# Patient Record
Sex: MALE | Race: WHITE | NOT HISPANIC OR LATINO | Employment: FULL TIME | URBAN - NONMETROPOLITAN AREA
[De-identification: names, ages, dates, MRNs, and addresses within clinical notes are randomized per-mention and may not be internally consistent; named-entity substitution may affect disease eponyms.]

---

## 2017-08-13 ENCOUNTER — HISTORY (OUTPATIENT)
Dept: EMERGENCY MEDICINE | Facility: OTHER | Age: 57
End: 2017-08-13

## 2017-08-14 ENCOUNTER — HISTORY (OUTPATIENT)
Dept: EMERGENCY MEDICINE | Facility: OTHER | Age: 57
End: 2017-08-14

## 2017-08-18 ENCOUNTER — HISTORY (OUTPATIENT)
Dept: EMERGENCY MEDICINE | Facility: OTHER | Age: 57
End: 2017-08-18

## 2017-08-18 ENCOUNTER — COMMUNICATION - GICH (OUTPATIENT)
Dept: EMERGENCY MEDICINE | Facility: OTHER | Age: 57
End: 2017-08-18

## 2017-08-18 DIAGNOSIS — A69.20 LYME DISEASE: ICD-10-CM

## 2017-08-18 DIAGNOSIS — G51.0 BELL'S PALSY: ICD-10-CM

## 2017-08-21 ENCOUNTER — OFFICE VISIT - GICH (OUTPATIENT)
Dept: FAMILY MEDICINE | Facility: OTHER | Age: 57
End: 2017-08-21

## 2017-08-21 ENCOUNTER — HISTORY (OUTPATIENT)
Dept: FAMILY MEDICINE | Facility: OTHER | Age: 57
End: 2017-08-21

## 2017-08-21 DIAGNOSIS — G51.0 BELL'S PALSY: ICD-10-CM

## 2017-08-21 DIAGNOSIS — A69.20 LYME DISEASE: ICD-10-CM

## 2017-08-21 ASSESSMENT — PATIENT HEALTH QUESTIONNAIRE - PHQ9: SUM OF ALL RESPONSES TO PHQ QUESTIONS 1-9: 0

## 2017-12-28 NOTE — PROGRESS NOTES
Patient Information     Patient Name MRN Sex Julius Peña [id number] Male 1960      Progress Notes by Venancio Foley MD at 2017 10:30 AM     Author:  Venancio Foley MD Service:  (none) Author Type:  Physician     Filed:  2017 11:16 AM Encounter Date:  2017 Status:  Signed     :  Venancio Foley MD (Physician)            SUBJECTIVE:    Julius Hansen is a 57 y.o. male who presents for follow-up Bell's palsy    HPI Comments: Patient arrives here for follow-up Bell's palsy. He was recent seen in the ER and started on Valtrex prednisone and doxycycline. His tested come back positive for Bell's palsy he arrives here for follow-up. No improvement has been noted.      No Known Allergies, No family history on file.,   Current Outpatient Prescriptions on File Prior to Visit       Medication  Sig Dispense Refill     doxycycline (VIBRAMYCIN) 100 mg tablet Take 1 tablet by mouth 2 times daily for 28 days. 56 tablet 0     mineral oil external liquid        multivitamin (MVI) tablet Take 1 tablet by mouth once daily.       POLYETHYLENE GLYCOL 3350 (MIRALAX ORAL) Take  by mouth.       No current facility-administered medications on file prior to visit.    ,   Past Surgical History:      Procedure  Laterality Date     NO PREVIOUS SURGERY     ,   Social History      Substance Use Topics        Smoking status:  Current Every Day Smoker     Packs/day: 1.00     Years: 20.00     Types: Cigarettes     Smokeless tobacco:  Never Used     Alcohol use  No    and   Social History      Substance Use Topics        Smoking status:  Current Every Day Smoker     Packs/day: 1.00     Years: 20.00     Types: Cigarettes     Smokeless tobacco:  Never Used     Alcohol use  No       REVIEW OF SYSTEMS:  ROS    OBJECTIVE:  /68  Pulse 64  Wt 95.9 kg (211 lb 6.4 oz)  BMI 29.6 kg/m2    EXAM:   Physical Exam   Constitutional: He is well-developed, well-nourished, and in no distress.   Neurological:   He  has right-sided facial weakness. Difficulty closing eyes. Good range of motion to his tongue.       ASSESSMENT/PLAN:    ICD-10-CM    1. Lyme disease A69.20    2. Facial paralysis/[place] palsy G51.0         Plan:  Recommended that he discontinue his prednisone. Can finish his course of doxycycline. We discussed resolution of the symptoms which according to up-to-date most people have complete resolution. Follow-up if getting worse.

## 2017-12-28 NOTE — TELEPHONE ENCOUNTER
Patient Information     Patient Name MRN Sex Julius Peña [id number] Male 1960      Telephone Encounter by Margarita Vargas at 2017  8:21 AM     Author:  Margarita Vargas Service:  (none) Author Type:  Physician     Filed:  2017  8:24 AM Encounter Date:  2017 Status:  Signed     :  Margarita Vargas (Physician)            I did leave a message for Don that his Lyme disease titers did return positive for an acute Lyme disease. This is what is causing his Bell's palsy. He needs to finish the medications as prescribed. However he definitely needs to follow-up this next week to be rechecked. If his Bell's palsy is not improving he may need a course of IV antibiotics. I have advised him to call back to the emergency department if he has any further questions.

## 2017-12-30 NOTE — NURSING NOTE
Patient Information     Patient Name MRN Sex Julius Peña [id number] Male 1960      Nursing Note by Belinda Bryan at 2017 10:30 AM     Author:  Belinda Bryan Service:  (none) Author Type:  (none)     Filed:  2017 10:34 AM Encounter Date:  2017 Status:  Signed     :  Belinda Bryan            Patient here for follow up from ED visits constipation and bells palsy. Belinda Bryan LPN .......................2017  10:29 AM

## 2018-01-27 VITALS
DIASTOLIC BLOOD PRESSURE: 68 MMHG | SYSTOLIC BLOOD PRESSURE: 122 MMHG | BODY MASS INDEX: 29.9 KG/M2 | WEIGHT: 211.4 LBS | HEART RATE: 64 BPM

## 2018-01-29 ASSESSMENT — PATIENT HEALTH QUESTIONNAIRE - PHQ9: SUM OF ALL RESPONSES TO PHQ QUESTIONS 1-9: 0

## 2018-02-21 ENCOUNTER — DOCUMENTATION ONLY (OUTPATIENT)
Dept: FAMILY MEDICINE | Facility: OTHER | Age: 58
End: 2018-02-21

## 2018-02-21 PROBLEM — A69.20 LYME DISEASE: Status: ACTIVE | Noted: 2017-08-15

## 2018-02-21 PROBLEM — F12.90 MARIJUANA USE: Status: ACTIVE | Noted: 2018-02-21

## 2018-02-21 PROBLEM — F17.200 TOBACCO DEPENDENCE: Status: ACTIVE | Noted: 2017-08-14

## 2018-02-21 PROBLEM — G51.0 FACIAL PARALYSIS/BELLS PALSY: Status: ACTIVE | Noted: 2017-08-15

## 2018-02-21 RX ORDER — MAGNESIUM CARB/ALUMINUM HYDROX 105-160MG
TABLET,CHEWABLE ORAL
COMMUNITY
End: 2021-03-29

## 2018-02-21 RX ORDER — DIPHENOXYLATE HYDROCHLORIDE AND ATROPINE SULFATE 2.5; .025 MG/1; MG/1
1 TABLET ORAL DAILY
COMMUNITY
End: 2021-03-29

## 2018-02-21 RX ORDER — POLYETHYLENE GLYCOL 3350 17 G/17G
POWDER, FOR SOLUTION ORAL
COMMUNITY
End: 2021-03-29

## 2021-03-18 ENCOUNTER — HOSPITAL ENCOUNTER (EMERGENCY)
Facility: OTHER | Age: 61
Discharge: HOME | End: 2021-03-18
Attending: STUDENT IN AN ORGANIZED HEALTH CARE EDUCATION/TRAINING PROGRAM | Admitting: STUDENT IN AN ORGANIZED HEALTH CARE EDUCATION/TRAINING PROGRAM
Payer: COMMERCIAL

## 2021-03-18 ENCOUNTER — APPOINTMENT (OUTPATIENT)
Dept: CT IMAGING | Facility: OTHER | Age: 61
End: 2021-03-18
Attending: STUDENT IN AN ORGANIZED HEALTH CARE EDUCATION/TRAINING PROGRAM
Payer: COMMERCIAL

## 2021-03-18 VITALS
DIASTOLIC BLOOD PRESSURE: 89 MMHG | HEART RATE: 62 BPM | RESPIRATION RATE: 16 BRPM | TEMPERATURE: 99.1 F | HEIGHT: 71 IN | WEIGHT: 210 LBS | OXYGEN SATURATION: 98 % | SYSTOLIC BLOOD PRESSURE: 143 MMHG | BODY MASS INDEX: 29.4 KG/M2

## 2021-03-18 DIAGNOSIS — N13.30 HYDRONEPHROSIS, RIGHT: ICD-10-CM

## 2021-03-18 DIAGNOSIS — R31.0 GROSS HEMATURIA: ICD-10-CM

## 2021-03-18 DIAGNOSIS — R10.2 SUPRAPUBIC PAIN: ICD-10-CM

## 2021-03-18 LAB
ALBUMIN SERPL-MCNC: 4 G/DL (ref 3.5–5.7)
ALBUMIN UR-MCNC: 300 MG/DL
ALP SERPL-CCNC: 107 U/L (ref 34–104)
ALT SERPL W P-5'-P-CCNC: 13 U/L (ref 7–52)
ANION GAP SERPL CALCULATED.3IONS-SCNC: 10 MMOL/L (ref 3–14)
APPEARANCE UR: ABNORMAL
AST SERPL W P-5'-P-CCNC: 20 U/L (ref 13–39)
BACTERIA #/AREA URNS HPF: ABNORMAL /HPF
BASOPHILS # BLD AUTO: 0 10E9/L (ref 0–0.2)
BASOPHILS NFR BLD AUTO: 0.3 %
BILIRUB SERPL-MCNC: 0.6 MG/DL (ref 0.3–1)
BILIRUB UR QL STRIP: NEGATIVE
BUN SERPL-MCNC: 15 MG/DL (ref 7–25)
CALCIUM SERPL-MCNC: 9.3 MG/DL (ref 8.6–10.3)
CHLORIDE SERPL-SCNC: 100 MMOL/L (ref 98–107)
CO2 SERPL-SCNC: 22 MMOL/L (ref 21–31)
COLOR UR AUTO: ABNORMAL
CREAT SERPL-MCNC: 1.26 MG/DL (ref 0.7–1.3)
DIFFERENTIAL METHOD BLD: ABNORMAL
EOSINOPHIL # BLD AUTO: 0.1 10E9/L (ref 0–0.7)
EOSINOPHIL NFR BLD AUTO: 0.8 %
ERYTHROCYTE [DISTWIDTH] IN BLOOD BY AUTOMATED COUNT: 14.7 % (ref 10–15)
GFR SERPL CREATININE-BSD FRML MDRD: 58 ML/MIN/{1.73_M2}
GLUCOSE SERPL-MCNC: 111 MG/DL (ref 70–105)
GLUCOSE UR STRIP-MCNC: NEGATIVE MG/DL
HCT VFR BLD AUTO: 34.8 % (ref 40–53)
HGB BLD-MCNC: 11.1 G/DL (ref 13.3–17.7)
HGB UR QL STRIP: ABNORMAL
IMM GRANULOCYTES # BLD: 0 10E9/L (ref 0–0.4)
IMM GRANULOCYTES NFR BLD: 0.3 %
KETONES UR STRIP-MCNC: 10 MG/DL
LABORATORY COMMENT REPORT: NORMAL
LACTATE BLD-SCNC: 0.7 MMOL/L (ref 0.7–2)
LEUKOCYTE ESTERASE UR QL STRIP: ABNORMAL
LYMPHOCYTES # BLD AUTO: 1.4 10E9/L (ref 0.8–5.3)
LYMPHOCYTES NFR BLD AUTO: 15.8 %
MCH RBC QN AUTO: 23.8 PG (ref 26.5–33)
MCHC RBC AUTO-ENTMCNC: 31.9 G/DL (ref 31.5–36.5)
MCV RBC AUTO: 75 FL (ref 78–100)
MONOCYTES # BLD AUTO: 0.7 10E9/L (ref 0–1.3)
MONOCYTES NFR BLD AUTO: 8.3 %
NEUTROPHILS # BLD AUTO: 6.4 10E9/L (ref 1.6–8.3)
NEUTROPHILS NFR BLD AUTO: 74.5 %
NITRATE UR QL: NEGATIVE
PH UR STRIP: 7 PH (ref 5–7)
PLATELET # BLD AUTO: 354 10E9/L (ref 150–450)
POTASSIUM SERPL-SCNC: 3.9 MMOL/L (ref 3.5–5.1)
PROT SERPL-MCNC: 7.6 G/DL (ref 6.4–8.9)
RBC # BLD AUTO: 4.66 10E12/L (ref 4.4–5.9)
RBC #/AREA URNS AUTO: >182 /HPF (ref 0–2)
SARS-COV-2 RNA RESP QL NAA+PROBE: NEGATIVE
SODIUM SERPL-SCNC: 132 MMOL/L (ref 134–144)
SOURCE: ABNORMAL
SP GR UR STRIP: >1.035 (ref 1–1.03)
SPECIMEN SOURCE: NORMAL
UROBILINOGEN UR STRIP-MCNC: NORMAL MG/DL (ref 0–2)
WBC # BLD AUTO: 8.7 10E9/L (ref 4–11)
WBC #/AREA URNS AUTO: >182 /HPF (ref 0–5)
WBC CLUMPS #/AREA URNS HPF: PRESENT /HPF

## 2021-03-18 PROCEDURE — U0005 INFEC AGEN DETEC AMPLI PROBE: HCPCS | Performed by: STUDENT IN AN ORGANIZED HEALTH CARE EDUCATION/TRAINING PROGRAM

## 2021-03-18 PROCEDURE — 250N000013 HC RX MED GY IP 250 OP 250 PS 637: Performed by: STUDENT IN AN ORGANIZED HEALTH CARE EDUCATION/TRAINING PROGRAM

## 2021-03-18 PROCEDURE — 258N000003 HC RX IP 258 OP 636: Performed by: STUDENT IN AN ORGANIZED HEALTH CARE EDUCATION/TRAINING PROGRAM

## 2021-03-18 PROCEDURE — 255N000002 HC RX 255 OP 636: Performed by: STUDENT IN AN ORGANIZED HEALTH CARE EDUCATION/TRAINING PROGRAM

## 2021-03-18 PROCEDURE — 83605 ASSAY OF LACTIC ACID: CPT | Performed by: STUDENT IN AN ORGANIZED HEALTH CARE EDUCATION/TRAINING PROGRAM

## 2021-03-18 PROCEDURE — C9803 HOPD COVID-19 SPEC COLLECT: HCPCS | Performed by: STUDENT IN AN ORGANIZED HEALTH CARE EDUCATION/TRAINING PROGRAM

## 2021-03-18 PROCEDURE — 80053 COMPREHEN METABOLIC PANEL: CPT | Performed by: STUDENT IN AN ORGANIZED HEALTH CARE EDUCATION/TRAINING PROGRAM

## 2021-03-18 PROCEDURE — 99285 EMERGENCY DEPT VISIT HI MDM: CPT | Mod: 25 | Performed by: STUDENT IN AN ORGANIZED HEALTH CARE EDUCATION/TRAINING PROGRAM

## 2021-03-18 PROCEDURE — 87086 URINE CULTURE/COLONY COUNT: CPT | Performed by: STUDENT IN AN ORGANIZED HEALTH CARE EDUCATION/TRAINING PROGRAM

## 2021-03-18 PROCEDURE — 96375 TX/PRO/DX INJ NEW DRUG ADDON: CPT | Performed by: STUDENT IN AN ORGANIZED HEALTH CARE EDUCATION/TRAINING PROGRAM

## 2021-03-18 PROCEDURE — U0003 INFECTIOUS AGENT DETECTION BY NUCLEIC ACID (DNA OR RNA); SEVERE ACUTE RESPIRATORY SYNDROME CORONAVIRUS 2 (SARS-COV-2) (CORONAVIRUS DISEASE [COVID-19]), AMPLIFIED PROBE TECHNIQUE, MAKING USE OF HIGH THROUGHPUT TECHNOLOGIES AS DESCRIBED BY CMS-2020-01-R: HCPCS | Performed by: STUDENT IN AN ORGANIZED HEALTH CARE EDUCATION/TRAINING PROGRAM

## 2021-03-18 PROCEDURE — 96374 THER/PROPH/DIAG INJ IV PUSH: CPT | Performed by: STUDENT IN AN ORGANIZED HEALTH CARE EDUCATION/TRAINING PROGRAM

## 2021-03-18 PROCEDURE — 74177 CT ABD & PELVIS W/CONTRAST: CPT | Mod: TC

## 2021-03-18 PROCEDURE — 81001 URINALYSIS AUTO W/SCOPE: CPT | Performed by: STUDENT IN AN ORGANIZED HEALTH CARE EDUCATION/TRAINING PROGRAM

## 2021-03-18 PROCEDURE — 99284 EMERGENCY DEPT VISIT MOD MDM: CPT | Performed by: STUDENT IN AN ORGANIZED HEALTH CARE EDUCATION/TRAINING PROGRAM

## 2021-03-18 PROCEDURE — 36415 COLL VENOUS BLD VENIPUNCTURE: CPT | Performed by: STUDENT IN AN ORGANIZED HEALTH CARE EDUCATION/TRAINING PROGRAM

## 2021-03-18 PROCEDURE — 250N000011 HC RX IP 250 OP 636: Performed by: STUDENT IN AN ORGANIZED HEALTH CARE EDUCATION/TRAINING PROGRAM

## 2021-03-18 PROCEDURE — 85025 COMPLETE CBC W/AUTO DIFF WBC: CPT | Performed by: STUDENT IN AN ORGANIZED HEALTH CARE EDUCATION/TRAINING PROGRAM

## 2021-03-18 RX ORDER — SODIUM CHLORIDE 9 MG/ML
INJECTION, SOLUTION INTRAVENOUS CONTINUOUS
Status: DISCONTINUED | OUTPATIENT
Start: 2021-03-18 | End: 2021-03-18 | Stop reason: HOSPADM

## 2021-03-18 RX ORDER — ONDANSETRON 2 MG/ML
4 INJECTION INTRAMUSCULAR; INTRAVENOUS ONCE
Status: COMPLETED | OUTPATIENT
Start: 2021-03-18 | End: 2021-03-18

## 2021-03-18 RX ORDER — SODIUM CHLORIDE 9 MG/ML
INJECTION, SOLUTION INTRAVENOUS CONTINUOUS
Status: DISCONTINUED | OUTPATIENT
Start: 2021-03-18 | End: 2021-03-18

## 2021-03-18 RX ORDER — ACETAMINOPHEN 325 MG/1
975 TABLET ORAL ONCE
Status: COMPLETED | OUTPATIENT
Start: 2021-03-18 | End: 2021-03-18

## 2021-03-18 RX ORDER — LEVOFLOXACIN 750 MG/1
750 TABLET, FILM COATED ORAL DAILY
Qty: 7 TABLET | Refills: 0 | Status: SHIPPED | OUTPATIENT
Start: 2021-03-18 | End: 2021-03-25

## 2021-03-18 RX ORDER — METRONIDAZOLE 500 MG/1
500 TABLET ORAL 3 TIMES DAILY
Qty: 21 TABLET | Refills: 0 | Status: SHIPPED | OUTPATIENT
Start: 2021-03-18 | End: 2021-03-25

## 2021-03-18 RX ORDER — IODIXANOL 320 MG/ML
100 INJECTION, SOLUTION INTRAVASCULAR ONCE
Status: COMPLETED | OUTPATIENT
Start: 2021-03-18 | End: 2021-03-18

## 2021-03-18 RX ADMIN — SODIUM CHLORIDE 1000 ML: 9 INJECTION, SOLUTION INTRAVENOUS at 06:04

## 2021-03-18 RX ADMIN — ACETAMINOPHEN 975 MG: 325 TABLET ORAL at 06:34

## 2021-03-18 RX ADMIN — IODIXANOL 100 ML: 320 INJECTION, SOLUTION INTRAVASCULAR at 05:23

## 2021-03-18 RX ADMIN — HYDROMORPHONE HYDROCHLORIDE 1 MG: 1 INJECTION, SOLUTION INTRAMUSCULAR; INTRAVENOUS; SUBCUTANEOUS at 04:49

## 2021-03-18 RX ADMIN — ONDANSETRON 4 MG: 2 INJECTION INTRAMUSCULAR; INTRAVENOUS at 04:49

## 2021-03-18 ASSESSMENT — MIFFLIN-ST. JEOR: SCORE: 1779.68

## 2021-03-18 NOTE — ED PROVIDER NOTES
History     Chief Complaint   Patient presents with     Abdominal Pain       Julius Hansen is a 61 year old year old male presenting with abdominal pain. Pain is located in the suprapubic region starting with sudden onset approximately 24 hours ago yesterday and has been constant. Pain can radiate to RLQ. He is unable to characterize, but states he does feel like there is something blocking his GI tract despite no clinical constipation with normal bowel movement yesterday. Also reports some hematuria. Denies modifying factors.  No abdominal surgical history.  He tried some MiraLAX with no improvement.  He did have a similar albeit less severe episode sometime ago which was attributed to constipation. Denies fever, chills, nausea, vomiting, other pain, dyspnea, dysuria, urinary retention, diarrhea, blood in stool, numbness, weakness.      No Known Allergies    Patient Active Problem List    Diagnosis Date Noted     Marijuana use 02/21/2018     Priority: Medium     Facial paralysis/[place] palsy 08/15/2017     Priority: Medium     Lyme disease 08/15/2017     Priority: Medium     Tobacco dependence 08/14/2017     Priority: Medium       Past Medical History:   Diagnosis Date     Personal history of other medical treatment (CODE)        Past Surgical History:   Procedure Laterality Date     OTHER SURGICAL HISTORY      [id number],NO PREVIOUS SURGERY       No family history on file.    Social History     Tobacco Use     Smoking status: Current Every Day Smoker     Packs/day: 1.00     Years: 20.00     Pack years: 20.00     Types: Cigarettes     Smokeless tobacco: Never Used   Substance Use Topics     Alcohol use: No     Drug use: Unknown     Types: Other     Comment: Drug use: No       Medications:    levofloxacin (LEVAQUIN) 750 MG tablet  metroNIDAZOLE (FLAGYL) 500 MG tablet  mineral oil (BL MINERAL OIL) oil  Multiple Vitamin (MULTI-VITAMINS) TABS  polyethylene glycol (MIRALAX/GLYCOLAX) Packet        Review of Systems: See  "HPI for pertinent negative and positive findings.    Physical Exam   BP (!) 143/89   Pulse 62   Temp 99.1  F (37.3  C) (Tympanic)   Resp 16   Ht 1.803 m (5' 11\")   Wt 95.3 kg (210 lb)   SpO2 98%   BMI 29.29 kg/m       General: awake, uncomfortable  HEENT: atraumatic, neck supple  Respiratory: normal effort, clear to auscultation bilaterally  Cardiovascular: regular rate and rhythm, no murmurs or gallops  Abdomen: BS+, soft, nondistended, tender in suprapubic region, no guarding or rebound  Extremities: no deformities, edema, or tenderness  : no CVA tenderness  Skin: warm, dry, no rashes  Neuro: alert, no focal deficits    ED Course           Results for orders placed or performed during the hospital encounter of 03/18/21 (from the past 24 hour(s))   CBC with platelets differential   Result Value Ref Range    WBC 8.7 4.0 - 11.0 10e9/L    RBC Count 4.66 4.4 - 5.9 10e12/L    Hemoglobin 11.1 (L) 13.3 - 17.7 g/dL    Hematocrit 34.8 (L) 40.0 - 53.0 %    MCV 75 (L) 78 - 100 fl    MCH 23.8 (L) 26.5 - 33.0 pg    MCHC 31.9 31.5 - 36.5 g/dL    RDW 14.7 10.0 - 15.0 %    Platelet Count 354 150 - 450 10e9/L    Diff Method Automated Method     % Neutrophils 74.5 %    % Lymphocytes 15.8 %    % Monocytes 8.3 %    % Eosinophils 0.8 %    % Basophils 0.3 %    % Immature Granulocytes 0.3 %    Absolute Neutrophil 6.4 1.6 - 8.3 10e9/L    Absolute Lymphocytes 1.4 0.8 - 5.3 10e9/L    Absolute Monocytes 0.7 0.0 - 1.3 10e9/L    Absolute Eosinophils 0.1 0.0 - 0.7 10e9/L    Absolute Basophils 0.0 0.0 - 0.2 10e9/L    Abs Immature Granulocytes 0.0 0 - 0.4 10e9/L   Comprehensive metabolic panel   Result Value Ref Range    Sodium 132 (L) 134 - 144 mmol/L    Potassium 3.9 3.5 - 5.1 mmol/L    Chloride 100 98 - 107 mmol/L    Carbon Dioxide 22 21 - 31 mmol/L    Anion Gap 10 3 - 14 mmol/L    Glucose 111 (H) 70 - 105 mg/dL    Urea Nitrogen 15 7 - 25 mg/dL    Creatinine 1.26 0.70 - 1.30 mg/dL    GFR Estimate 58 (L) >60 mL/min/[1.73_m2]    GFR " Estimate If Black 70 >60 mL/min/[1.73_m2]    Calcium 9.3 8.6 - 10.3 mg/dL    Bilirubin Total 0.6 0.3 - 1.0 mg/dL    Albumin 4.0 3.5 - 5.7 g/dL    Protein Total 7.6 6.4 - 8.9 g/dL    Alkaline Phosphatase 107 (H) 34 - 104 U/L    ALT 13 7 - 52 U/L    AST 20 13 - 39 U/L   Lactic acid   Result Value Ref Range    Lactic Acid 0.7 0.7 - 2.0 mmol/L   CT Abdomen Pelvis w Contrast    Narrative    PROCEDURE INFORMATION:   Exam: CT Abdomen And Pelvis With Contrast   Exam date and time: 3/18/2021 5:10 AM   Age: 61 years old   Clinical indication: Constipation; Abdominal pain; Generalized     TECHNIQUE:   Imaging protocol: Computed tomography of the abdomen and pelvis with contrast.   Radiation optimization: All CT scans at this facility use at least one of these   dose optimization techniques: automated exposure control; mA and/or kV   adjustment per patient size (includes targeted exams where dose is matched to   clinical indication); or iterative reconstruction.   Contrast material: VISIPAQUE 320; Contrast volume: 100 ml; Contrast route:   INTRAVENOUS (IV);      COMPARISON:   No relevant prior studies available.     FINDINGS:   Liver: Normal. No mass.   Gallbladder and bile ducts: Possible gallstones.   Pancreas: Normal. No ductal dilation.   Spleen: Normal. No splenomegaly.   Adrenal glands: Normal. No mass.   Kidneys and ureters: Severe hydronephrosis, longstanding, with cortical   thinning right kidney. A few calcifications within the distended renal pelvis.   Level of obstruction in the region of the proximal right ureter. Severe   thickening of the ureteric mucosa with apparent mass. Recommend further   evaluation/retrograde.   Stomach and bowel: No bowel obstruction.   Appendix: Appendix normal.     Intraperitoneal space: No free fluid within the pelvis or within the dependent   portions of the peritoneum.   Vasculature: Calcification of the aorta. Flow within the superior mesenteric   artery, celiac trunk and inferior  mesenteric arteries.   Lymph nodes: Unremarkable. No enlarged lymph nodes.   Urinary bladder: Unremarkable as visualized.   Reproductive: Unremarkable as visualized.   Bones/joints: Moderate degenerative disc disease. Degenerative changes are   present within the spine.   Soft tissues: Unremarkable.       Impression    IMPRESSION:   1. Severe hydronephrosis, longstanding, with cortical thinning right kidney. A   few calcifications within the distended renal pelvis. Level of obstruction in   the region of the proximal right ureter. Severe thickening of the ureteric   mucosa with apparent mass. Recommend further evaluation/retrograde. Rule out   ureteric mass.visualized portions of the lung bases normal.   2. No bowel obstruction.   3. No free fluid within the pelvis or within the dependent portions of the   peritoneum.   4. Appendix normal.   5. Possible gallstones.     THIS DOCUMENT HAS BEEN ELECTRONICALLY SIGNED BY ANTOINETTE LOPEZ MD   UA with Microscopic reflex to Culture    Specimen: Urine clean catch; Midstream Urine   Result Value Ref Range    Color Urine Dark Brown     Appearance Urine Cloudy     Glucose Urine Negative NEG^Negative mg/dL    Bilirubin Urine Negative NEG^Negative    Ketones Urine 10 (A) NEG^Negative mg/dL    Specific Gravity Urine >1.035 (H) 1.003 - 1.035    Blood Urine Large (A) NEG^Negative    pH Urine 7.0 5.0 - 7.0 pH    Protein Albumin Urine 300 (A) NEG^Negative mg/dL    Urobilinogen mg/dL Normal 0.0 - 2.0 mg/dL    Nitrite Urine Negative NEG^Negative    Leukocyte Esterase Urine Trace (A) NEG^Negative    Source Midstream Urine     WBC Urine >182 (H) 0 - 5 /HPF    RBC Urine >182 (H) 0 - 2 /HPF    WBC Clumps Present (A) NEG^Negative /HPF    Bacteria Urine Moderate (A) NEG^Negative /HPF       Medications   0.9% sodium chloride BOLUS (1,000 mLs Intravenous New Bag 3/18/21 0604)     Followed by   sodium chloride 0.9% infusion (has no administration in time range)   HYDROmorphone (DILAUDID)  injection 1 mg (1 mg Intravenous Given 3/18/21 1829)   ondansetron (ZOFRAN) injection 4 mg (4 mg Intravenous Given 3/18/21 9879)   iodixanol (VISIPAQUE 320) injection 100 mL (100 mLs Intravenous Given 3/18/21 1119)   acetaminophen (TYLENOL) tablet 975 mg (975 mg Oral Given 3/18/21 0634)       Assessments & Plan (with Medical Decision Making)     I have reviewed the nursing notes.    Evaluated for abdominal pain. Differential includes appendicitis, diverticulitis, UTI, incarcerated hernia, mesenteric ischemia, constipation, stone. Suprapubic pain on exam with urine with gross blood. Creatinine wnl. CT shows severe hydronephrosis with unclear source for obstruction. Urology consulted who states this could be an abscess and recommends antibiotics with close PCP follow up as well as urology follow up for additional evaluation for hydronephrosis with hematuria. Pain improved with dilaudid and tylenol. Antibiotics prescribed with strict instructions for close follow up with PCP along with urology referral placed. Patient given ED return precautions. All questions were answered and the patient is comfortable with plan for discharge and verbalizes plan understanding. Discharged in stable condition.    I have reviewed the findings, diagnosis, plan and need for follow up with the patient.    New Prescriptions    LEVOFLOXACIN (LEVAQUIN) 750 MG TABLET    Take 1 tablet (750 mg) by mouth daily for 7 days    METRONIDAZOLE (FLAGYL) 500 MG TABLET    Take 1 tablet (500 mg) by mouth 3 times daily for 7 days       Final diagnoses:   Gross hematuria   Hydronephrosis, right   Suprapubic pain       3/18/2021   [facility]       Girish Phillips MD  03/18/21 7572

## 2021-03-18 NOTE — ED TRIAGE NOTES
ED Nursing Triage Note (General)   ________________________________    Julius Hansen is a 61 year old Male that presents to triage private car  With history of  Lower abdominal pain reported by patient   Significant symptoms had onset yesterday at 1200.  Patient patient states pain has not changed since yesterday.  Denies nausea or vomiting, pain is constant.  Reports normal bowel movements and denies difficulty with urination.  Patient appears alert , in mild distress., and cooperative behavior.  GCS 15  Airway: intact  Breathing noted as Normal.  Circulation Normal  Skin normal  Action taken:  [place] 909      PRE HOSPITAL PRIOR LIVING SITUATION Alone

## 2021-03-18 NOTE — PROGRESS NOTES
1.  Has the patient had a previous reaction to IV contrast? n    2.  Does the patient have kidney disease? n    3.  Is the patient on dialysis? n    If YES to any of these questions, exam will be reviewed with a Radiologist before administering contrast.    1.  Is patient currently taking metformin? n     If NO: Technologist will give the patient normal post CT instructions.     If YES: Technologist will obtain GFR from Lab.    2.  Is GFR is greater than 60? unknown     If YES: Technologist will give the patient normal post CT instructions.     If NO: Technologist will give the patient METFORMIN post CT instructions.

## 2021-03-18 NOTE — DISCHARGE INSTRUCTIONS
As discussed, please  and complete the 2 antibiotic course for possible infection in your abdomen.  Please follow-up with your primary care provider in 1 week or sooner if worsening symptoms.  A urology referral was also placed for you and they will call you to schedule an appointment.  Reasons to return the emergency department include fever/chills, nausea/vomiting, worsening severe pain, or inability to pass urine.

## 2021-03-20 LAB
BACTERIA SPEC CULT: NORMAL
SPECIMEN SOURCE: NORMAL

## 2021-03-24 ENCOUNTER — OFFICE VISIT (OUTPATIENT)
Dept: UROLOGY | Facility: OTHER | Age: 61
End: 2021-03-24
Attending: STUDENT IN AN ORGANIZED HEALTH CARE EDUCATION/TRAINING PROGRAM
Payer: COMMERCIAL

## 2021-03-24 VITALS
SYSTOLIC BLOOD PRESSURE: 136 MMHG | BODY MASS INDEX: 28.51 KG/M2 | WEIGHT: 204.4 LBS | DIASTOLIC BLOOD PRESSURE: 82 MMHG | RESPIRATION RATE: 12 BRPM | HEART RATE: 80 BPM

## 2021-03-24 DIAGNOSIS — R31.0 GROSS HEMATURIA: ICD-10-CM

## 2021-03-24 DIAGNOSIS — N13.30 HYDRONEPHROSIS, RIGHT: ICD-10-CM

## 2021-03-24 PROCEDURE — 99204 OFFICE O/P NEW MOD 45 MIN: CPT | Performed by: UROLOGY

## 2021-03-24 RX ORDER — HYDROCODONE BITARTRATE AND ACETAMINOPHEN 5; 325 MG/1; MG/1
1-2 TABLET ORAL EVERY 4 HOURS PRN
Status: CANCELLED | OUTPATIENT
Start: 2021-03-24

## 2021-03-24 RX ORDER — CEFTRIAXONE 1 G/1
1 INJECTION, POWDER, FOR SOLUTION INTRAMUSCULAR; INTRAVENOUS
Status: CANCELLED | OUTPATIENT
Start: 2021-03-24

## 2021-03-24 SDOH — HEALTH STABILITY: MENTAL HEALTH: HOW OFTEN DO YOU HAVE A DRINK CONTAINING ALCOHOL?: 2-4 TIMES A MONTH

## 2021-03-24 SDOH — HEALTH STABILITY: MENTAL HEALTH: HOW OFTEN DO YOU HAVE 6 OR MORE DRINKS ON ONE OCCASION?: NOT ASKED

## 2021-03-24 SDOH — HEALTH STABILITY: MENTAL HEALTH: HOW MANY STANDARD DRINKS CONTAINING ALCOHOL DO YOU HAVE ON A TYPICAL DAY?: 10 OR MORE

## 2021-03-24 ASSESSMENT — PAIN SCALES - GENERAL: PAINLEVEL: NO PAIN (0)

## 2021-03-24 NOTE — PROGRESS NOTES
Type of Visit  NPV    Chief Complaint  Gross hematuria  Right hydronephrosis    HPI  Mr. Hansen is a 61 year old male who presents with gross hematuria and right hydronephrosis.  Gross hematuria started 1 week ago.  The patient was seen in the ED and labs revealed concern for infection.  The urine culture resulted as negative.  He underwent a CT scan revealing right hydronephrosis.  Patient denies right flank pain now or at any time in the recent past.  He continues the antibiotics that were prescribed and denies dysuria or fevers.    Hematuria-related signs/symptoms  History of smoking?    Yes  History of chemotherapy?   No  History of pelvic radiation?   No  History of kidney or bladder stones?  No  History of frequent urinary tract infections? No      Past Medical History  He  has a past medical history of Personal history of other medical treatment (CODE).  Patient Active Problem List   Diagnosis     Facial paralysis/[place] palsy     Marijuana use     Lyme disease     Tobacco dependence       Past Surgical History  He  has a past surgical history that includes other surgical history.    Medications  He has a current medication list which includes the following prescription(s): levofloxacin, metronidazole, mineral oil, multi-vitamins, and polyethylene glycol.    Allergies  No Known Allergies    Social History  He  reports that he has been smoking cigarettes. He has a 20.00 pack-year smoking history. He has never used smokeless tobacco. He reports current alcohol use.  No drug abuse.    Family History  No family history on file.    Review of Systems  I personally reviewed the ROS with the patient.    Nursing Notes:   Kiki Thacker RN  3/24/2021  2:03 PM  Signed  Review of Systems:    Weight loss:    No     Recent fever/chills:  No   Night sweats:   No  Current skin rash:  No   Recent hair loss:  No  Heat intolerance:  No   Cold intolerance:  No  Chest pain:   No   Palpitations:   No  Shortness of  breath:  No   Wheezing:   No  Constipation:    No   Diarrhea:   No   Nausea:   No   Vomiting:   No   Kidney/side pain:  No   Back pain:   No  Frequent headaches:  No   Dizziness:     No  Leg swelling:   No   Calf pain:    No    Parents, brothers or sisters with history of kidney cancer:   No  Parents, brothers or sisters with history of bladder cancer: No      Physical Exam  Vitals:    03/24/21 1359   BP: 136/82   BP Location: Right arm   Patient Position: Sitting   Cuff Size: Adult Regular   Pulse: 80   Resp: 12   Weight: 92.7 kg (204 lb 6.4 oz)     Constitutional: No acute distress.  Alert and cooperative   Head: NCAT  Eyes: Conjunctivae normal  Cardiovascular: Regular rate.  Pulmonary/Chest: Respirations are even and non-labored bilaterally, no audible wheezing  Abdominal: Soft. No distension, tenderness, masses or guarding.   Neurological: A + O x 3.  Cranial Nerves II-XII grossly intact.  Extremities: VINCENT x 4, Warm. No clubbing.  No cyanosis.    Skin: Pink, warm and dry.  No visible rashes noted.  Psychiatric:  Normal mood and affect  Back:  No left CVA tenderness.  No right CVA tenderness.  Genitourinary:  Nonpalpable bladder    Labs  Results for HODA LALA (MRN [id number]) as of 3/24/2021 14:05   3/18/2021 06:37 3/18/2021 06:55   Color Urine Dark Brown    Appearance Urine Cloudy    Glucose Urine Negative    Bilirubin Urine Negative    Ketones Urine 10 (A)    Specific Gravity Urine >1.035 (H)    pH Urine 7.0    Protein Albumin Urine 300 (A)    Urobilinogen mg/dL Normal    Nitrite Urine Negative    Blood Urine Large (A)    Leukocyte Esterase Urine Trace (A)    Source Midstream Urine    WBC Urine >182 (H)    RBC Urine >182 (H)    Bacteria Urine Moderate (A)    WBC Clumps Present (A)    Culture Micro  Urine culture negative (no growth of uropathogens).   Specimen Description  Midstream Urine     Imaging  CT a/p   3/18/2021  I personally reviewed and interpreted the images and report.  IMPRESSION:   1.  Severe hydronephrosis, longstanding, with cortical thinning right kidney. A   few calcifications within the distended renal pelvis. Level of obstruction in   the region of the proximal right ureter. Severe thickening of the ureteric   mucosa with apparent mass. Recommend further evaluation/retrograde. Rule out   ureteric mass.visualized portions of the lung bases normal.   2. No bowel obstruction.   3. No free fluid within the pelvis or within the dependent portions of the   peritoneum.   4. Appendix normal.   5. Possible gallstones.     Assessment  Mr. Hansen is a 61 year old active smoking male with gross hematuria and right hydronephrosis.  I reviewed the past notes, labs and recent imaging.    Based on the imaging I explained that I believe his right kidney is essentially nonfunctional.  The kidney has likely been obstructed for years.  There is clearly a very thin rim of renal parenchyma remaining.  There is some uroepithelial thickening or collection of debris at the dependent portion of the dilated renal pelvis.  Given his history of smoking and gross hematuria I discussed evaluation of this area with ureteroscopy.  At the same time he would undergo formal cystoscopy to complete the hematuria work-up.    Plan  Cystoscopy with right retrograde pyelogram and diagnostic ureteroscopy in the OR under MAC sedation   -Preop with PCP for preoperative medication management is appreciated

## 2021-03-25 PROBLEM — N13.30 HYDRONEPHROSIS, RIGHT: Status: ACTIVE | Noted: 2021-03-25

## 2021-03-25 PROBLEM — R31.0 GROSS HEMATURIA: Status: ACTIVE | Noted: 2021-03-25

## 2021-03-29 ENCOUNTER — OFFICE VISIT (OUTPATIENT)
Dept: FAMILY MEDICINE | Facility: OTHER | Age: 61
End: 2021-03-29
Attending: PHYSICIAN ASSISTANT
Payer: COMMERCIAL

## 2021-03-29 VITALS
OXYGEN SATURATION: 100 % | BODY MASS INDEX: 28.98 KG/M2 | HEIGHT: 70 IN | HEART RATE: 85 BPM | TEMPERATURE: 98.2 F | SYSTOLIC BLOOD PRESSURE: 124 MMHG | RESPIRATION RATE: 19 BRPM | WEIGHT: 202.4 LBS | DIASTOLIC BLOOD PRESSURE: 80 MMHG

## 2021-03-29 DIAGNOSIS — R31.0 GROSS HEMATURIA: ICD-10-CM

## 2021-03-29 DIAGNOSIS — Z01.818 PRE-OP EXAM: Primary | ICD-10-CM

## 2021-03-29 DIAGNOSIS — N13.30 HYDRONEPHROSIS, RIGHT: ICD-10-CM

## 2021-03-29 LAB
ANION GAP SERPL CALCULATED.3IONS-SCNC: 3 MMOL/L (ref 3–14)
BUN SERPL-MCNC: 14 MG/DL (ref 7–25)
CALCIUM SERPL-MCNC: 8.8 MG/DL (ref 8.6–10.3)
CHLORIDE SERPL-SCNC: 104 MMOL/L (ref 98–107)
CO2 SERPL-SCNC: 27 MMOL/L (ref 21–31)
CREAT SERPL-MCNC: 1.41 MG/DL (ref 0.7–1.3)
GFR SERPL CREATININE-BSD FRML MDRD: 51 ML/MIN/{1.73_M2}
GLUCOSE SERPL-MCNC: 192 MG/DL (ref 70–105)
HGB BLD-MCNC: 10.3 G/DL (ref 13.3–17.7)
POTASSIUM SERPL-SCNC: 4 MMOL/L (ref 3.5–5.1)
SODIUM SERPL-SCNC: 134 MMOL/L (ref 134–144)

## 2021-03-29 PROCEDURE — 85018 HEMOGLOBIN: CPT | Mod: ZL | Performed by: PHYSICIAN ASSISTANT

## 2021-03-29 PROCEDURE — 36415 COLL VENOUS BLD VENIPUNCTURE: CPT | Mod: ZL | Performed by: PHYSICIAN ASSISTANT

## 2021-03-29 PROCEDURE — 93000 ELECTROCARDIOGRAM COMPLETE: CPT | Performed by: INTERNAL MEDICINE

## 2021-03-29 PROCEDURE — 80048 BASIC METABOLIC PNL TOTAL CA: CPT | Mod: ZL | Performed by: PHYSICIAN ASSISTANT

## 2021-03-29 PROCEDURE — 99202 OFFICE O/P NEW SF 15 MIN: CPT | Performed by: PHYSICIAN ASSISTANT

## 2021-03-29 ASSESSMENT — PAIN SCALES - GENERAL: PAINLEVEL: NO PAIN (0)

## 2021-03-29 ASSESSMENT — MIFFLIN-ST. JEOR: SCORE: 1729.33

## 2021-03-29 NOTE — H&P (VIEW-ONLY)
[facility]  [address]  [place] [address]  Phone: [phone]  Fax: [phone]  Primary Provider: No Ref-Primary, Physician  Pre-op Performing Provider: JIM TOLEDO    PREOPERATIVE EVALUATION:  Today's date: 3/29/2021    Julius Hansen is a 61 year old male who presents for a preoperative evaluation.    Surgical Information:  Surgery/Procedure: URS  Surgery Location: [facility]  Surgeon: Katia  Surgery Date: 4/6/21  Time of Surgery: TBD  Where patient plans to recover: At home with family  Fax number for surgical facility: Note does not need to be faxed, will be available electronically in Epic.    Type of Anesthesia Anticipated: to be determined    Assessment & Plan     The proposed surgical procedure is considered LOW risk.    1. Pre-op exam    2. Hydronephrosis, right    3. Gross hematuria        Elevated glucose on BMP, I recommended establishing with a provider, diabetes workup and yearly preventive physical exams with lab work.     HGB 10.3    GFR slightly lower than previous and Creat elevated but this could be reflective of hydronephrosis, further indication for URS and the proposed procedure. Drink plenty of water, stay hydrated.    Follow up with PCP/establish after procedure.     Risks and Recommendations:  The patient has the following additional risks and recommendations for perioperative complications:   - No identified additional risk factors other than previously addressed    Medication Instructions:  Patient is on no chronic medications    RECOMMENDATION:  APPROVAL GIVEN to proceed with proposed procedure, without further diagnostic evaluation.    Review of external notes as documented above     20 minutes spent on the date of the encounter doing chart review, review of outside records, review of test results, interpretation of tests, patient visit and documentation     Subjective     HPI related to upcoming procedure: Recent ED visit on 3/18/2021 for gross  hematuria, right hydronephrosis, and suprapubic pain.  Symptoms present for the last 2 weeks.  Underwent CT scan in the emergency room which showed right hydronephrosis.  Was started on levofloxacin and metronidazole at the emergency room.  He denies any fever, chills, night sweats, dysuria, pyuria, flank pain or back pain.  Scheduled to go undergo cystoscopy with right retrograde pyelogram and diagnostic ureteroscopy on 4/6/2021.    Preop Questions 3/29/2021   1. Have you ever had a heart attack or stroke? No   2. Have you ever had surgery on your heart or blood vessels, such as a stent placement, a coronary artery bypass, or surgery on an artery in your head, neck, heart, or legs? No   3. Do you have chest pain with activity? No   4. Do you have a history of  heart failure? No   5. Do you currently have a cold, bronchitis or symptoms of other infection? No   6. Do you have a cough, shortness of breath, or wheezing? No   7. Do you or anyone in your family have previous history of blood clots? No   8. Do you or does anyone in your family have a serious bleeding problem such as prolonged bleeding following surgeries or cuts? No   9. Have you ever had problems with anemia or been told to take iron pills? No   10. Have you had any abnormal blood loss such as black, tarry or bloody stools? No   11. Have you ever had a blood transfusion? No   12. Are you willing to have a blood transfusion if it is medically needed before, during, or after your surgery? Yes   13. Have you or any of your relatives ever had problems with anesthesia? No   14. Do you have sleep apnea, excessive snoring or daytime drowsiness? No   15. Do you have any artifical heart valves or other implanted medical devices like a pacemaker, defibrillator, or continuous glucose monitor? No   16. Do you have artificial joints? No   17. Are you allergic to latex? No       Health Care Directive:  Patient does not have a Health Care Directive or Living Will:  Discussed advance care planning with patient; however, patient declined at this time.    Preoperative Review of :   reviewed - no record of controlled substances prescribed.    Status of Chronic Conditions:  See problem list for active medical problems.  Problems all longstanding and stable, except as noted/documented.  See ROS for pertinent symptoms related to these conditions.    Review of Systems  CONSTITUTIONAL: NEGATIVE for fever, chills, change in weight  INTEGUMENTARY/SKIN: NEGATIVE for worrisome rashes, moles or lesions  EYES: NEGATIVE for vision changes or irritation  ENT/MOUTH: NEGATIVE for ear, mouth and throat problems  RESP: NEGATIVE for significant cough or SOB  BREAST: NEGATIVE for masses, tenderness or discharge  CV: NEGATIVE for chest pain, palpitations or peripheral edema  GI: NEGATIVE for nausea, abdominal pain, heartburn, or change in bowel habits  : NEGATIVE for frequency, dysuria, or hematuria  MUSCULOSKELETAL: NEGATIVE for significant arthralgias or myalgia  NEURO: NEGATIVE for weakness, dizziness or paresthesias  ENDOCRINE: NEGATIVE for temperature intolerance, skin/hair changes  HEME: NEGATIVE for bleeding problems  PSYCHIATRIC: NEGATIVE for changes in mood or affect    Patient Active Problem List    Diagnosis Date Noted     Gross hematuria 03/25/2021     Priority: Medium     Added automatically from request for surgery [id number]       Hydronephrosis, right 03/25/2021     Priority: Medium     Added automatically from request for surgery [id number]       Marijuana use 02/21/2018     Priority: Medium     Facial paralysis/[place] palsy 08/15/2017     Priority: Medium     Lyme disease 08/15/2017     Priority: Medium     Tobacco dependence 08/14/2017     Priority: Medium      Past Medical History:   Diagnosis Date     Personal history of other medical treatment (CODE)     No Comments Provided     Past Surgical History:   Procedure Laterality Date     OTHER SURGICAL HISTORY      [id number],NO  "PREVIOUS SURGERY     No current outpatient medications on file.     No Known Allergies     Social History     Tobacco Use     Smoking status: Current Every Day Smoker     Packs/day: 1.00     Years: 20.00     Pack years: 20.00     Types: Cigarettes     Smokeless tobacco: Never Used   Substance Use Topics     Alcohol use: Yes     Frequency: 2-4 times a month     Drinks per session: 10 or more     History reviewed. No pertinent family history.  History   Drug Use     Types: Marijuana     Comment: Drug use: No         Objective     /80 (BP Location: Right arm, Patient Position: Sitting, Cuff Size: Adult Regular)   Pulse 85   Temp 98.2  F (36.8  C) (Tympanic)   Resp 19   Ht 1.778 m (5' 10\")   Wt 91.8 kg (202 lb 6.4 oz)   SpO2 100%   BMI 29.04 kg/m      Physical Exam    GENERAL APPEARANCE: healthy, alert and no distress     EYES: EOMI,  PERRL     HENT: ear canals and TM's normal and nose and mouth without ulcers or lesions     NECK: no adenopathy, no asymmetry, masses, or scars and thyroid normal to palpation     RESP: lungs clear to auscultation - no rales, rhonchi or wheezes     CV: regular rates and rhythm, normal S1 S2, no S3 or S4 and no murmur, click or rub     ABDOMEN:  soft, nontender, no HSM or masses and bowel sounds normal     MS: extremities normal- no gross deformities noted, no evidence of inflammation in joints, FROM in all extremities.     SKIN: no suspicious lesions or rashes     NEURO: Normal strength and tone, sensory exam grossly normal, mentation intact and speech normal     PSYCH: mentation appears normal. and affect normal/bright     LYMPHATICS: No cervical adenopathy    Recent Labs   Lab Test 03/18/21  0505   HGB 11.1*      *   POTASSIUM 3.9   CR 1.26      Diagnostics:  Recent Results (from the past 24 hour(s))   Hemoglobin    Collection Time: 03/29/21  1:03 PM   Result Value Ref Range    Hemoglobin 10.3 (L) 13.3 - 17.7 g/dL   Basic Metabolic Panel    Collection Time: " 03/29/21  1:03 PM   Result Value Ref Range    Sodium 134 134 - 144 mmol/L    Potassium 4.0 3.5 - 5.1 mmol/L    Chloride 104 98 - 107 mmol/L    Carbon Dioxide 27 21 - 31 mmol/L    Anion Gap 3 3 - 14 mmol/L    Glucose 192 (H) 70 - 105 mg/dL    Urea Nitrogen 14 7 - 25 mg/dL    Creatinine 1.41 (H) 0.70 - 1.30 mg/dL    GFR Estimate 51 (L) >60 mL/min/[1.73_m2]    GFR Estimate If Black 62 >60 mL/min/[1.73_m2]    Calcium 8.8 8.6 - 10.3 mg/dL   EKG 12-lead complete w/read - Clinics    Collection Time: 03/29/21  1:06 PM   Result Value Ref Range    Interpretation ECG Click View Image link to view waveform and result       EKG required for history of first-degree AV block and not completed in the last 90 days.   EKG personally reviewed and shows normal sinus rhythm  Ventricular rate 79 bpm  OK interval 188ms  QRS duration 82 ms  QT/QTc 378/433 ms  PRT axis 53 57 56    Previous EKG 8/14/2017  Rate 80.  First-degree AV block with OK measuring 256 ms.  Otherwise normal EKG.  No previous for comparison Chris Paez MD     Revised Cardiac Risk Index (RCRI):  The patient has the following serious cardiovascular risks for perioperative complications:   - No serious cardiac risks = 0 points     RCRI Interpretation: 0 points: Class I (very low risk - 0.4% complication rate)    Signed Electronically by: TATIANNA Dick  I reviewed copy of this evaluation report is provided to requesting physician.

## 2021-03-29 NOTE — NURSING NOTE
"Chief Complaint   Patient presents with     Pre-Op Exam     Patient presents to clinic for pre-op.    Initial /80 (BP Location: Right arm, Patient Position: Sitting, Cuff Size: Adult Regular)   Pulse 85   Temp 98.2  F (36.8  C) (Tympanic)   Resp 19   Ht 1.778 m (5' 10\")   Wt 91.8 kg (202 lb 6.4 oz)   SpO2 100%   BMI 29.04 kg/m   Estimated body mass index is 29.04 kg/m  as calculated from the following:    Height as of this encounter: 1.778 m (5' 10\").    Weight as of this encounter: 91.8 kg (202 lb 6.4 oz).         Medication Reconciliation: Complete      Elizabeth Lee   "

## 2021-03-29 NOTE — PROGRESS NOTES
[facility]  [address]  [place] [address]  Phone: [phone]  Fax: [phone]  Primary Provider: No Ref-Primary, Physician  Pre-op Performing Provider: JIM TOLEDO    PREOPERATIVE EVALUATION:  Today's date: 3/29/2021    Julius Hansen is a 61 year old male who presents for a preoperative evaluation.    Surgical Information:  Surgery/Procedure: URS  Surgery Location: [facility]  Surgeon: Katia  Surgery Date: 4/6/21  Time of Surgery: TBD  Where patient plans to recover: At home with family  Fax number for surgical facility: Note does not need to be faxed, will be available electronically in Epic.    Type of Anesthesia Anticipated: to be determined    Assessment & Plan     The proposed surgical procedure is considered LOW risk.    1. Pre-op exam    2. Hydronephrosis, right    3. Gross hematuria        Elevated glucose on BMP, I recommended establishing with a provider, diabetes workup and yearly preventive physical exams with lab work.     HGB 10.3    GFR slightly lower than previous and Creat elevated but this could be reflective of hydronephrosis, further indication for URS and the proposed procedure. Drink plenty of water, stay hydrated.    Follow up with PCP/establish after procedure.     Risks and Recommendations:  The patient has the following additional risks and recommendations for perioperative complications:   - No identified additional risk factors other than previously addressed    Medication Instructions:  Patient is on no chronic medications    RECOMMENDATION:  APPROVAL GIVEN to proceed with proposed procedure, without further diagnostic evaluation.    Review of external notes as documented above     20 minutes spent on the date of the encounter doing chart review, review of outside records, review of test results, interpretation of tests, patient visit and documentation     Subjective     HPI related to upcoming procedure: Recent ED visit on 3/18/2021 for gross  hematuria, right hydronephrosis, and suprapubic pain.  Symptoms present for the last 2 weeks.  Underwent CT scan in the emergency room which showed right hydronephrosis.  Was started on levofloxacin and metronidazole at the emergency room.  He denies any fever, chills, night sweats, dysuria, pyuria, flank pain or back pain.  Scheduled to go undergo cystoscopy with right retrograde pyelogram and diagnostic ureteroscopy on 4/6/2021.    Preop Questions 3/29/2021   1. Have you ever had a heart attack or stroke? No   2. Have you ever had surgery on your heart or blood vessels, such as a stent placement, a coronary artery bypass, or surgery on an artery in your head, neck, heart, or legs? No   3. Do you have chest pain with activity? No   4. Do you have a history of  heart failure? No   5. Do you currently have a cold, bronchitis or symptoms of other infection? No   6. Do you have a cough, shortness of breath, or wheezing? No   7. Do you or anyone in your family have previous history of blood clots? No   8. Do you or does anyone in your family have a serious bleeding problem such as prolonged bleeding following surgeries or cuts? No   9. Have you ever had problems with anemia or been told to take iron pills? No   10. Have you had any abnormal blood loss such as black, tarry or bloody stools? No   11. Have you ever had a blood transfusion? No   12. Are you willing to have a blood transfusion if it is medically needed before, during, or after your surgery? Yes   13. Have you or any of your relatives ever had problems with anesthesia? No   14. Do you have sleep apnea, excessive snoring or daytime drowsiness? No   15. Do you have any artifical heart valves or other implanted medical devices like a pacemaker, defibrillator, or continuous glucose monitor? No   16. Do you have artificial joints? No   17. Are you allergic to latex? No       Health Care Directive:  Patient does not have a Health Care Directive or Living Will:  Discussed advance care planning with patient; however, patient declined at this time.    Preoperative Review of :   reviewed - no record of controlled substances prescribed.    Status of Chronic Conditions:  See problem list for active medical problems.  Problems all longstanding and stable, except as noted/documented.  See ROS for pertinent symptoms related to these conditions.    Review of Systems  CONSTITUTIONAL: NEGATIVE for fever, chills, change in weight  INTEGUMENTARY/SKIN: NEGATIVE for worrisome rashes, moles or lesions  EYES: NEGATIVE for vision changes or irritation  ENT/MOUTH: NEGATIVE for ear, mouth and throat problems  RESP: NEGATIVE for significant cough or SOB  BREAST: NEGATIVE for masses, tenderness or discharge  CV: NEGATIVE for chest pain, palpitations or peripheral edema  GI: NEGATIVE for nausea, abdominal pain, heartburn, or change in bowel habits  : NEGATIVE for frequency, dysuria, or hematuria  MUSCULOSKELETAL: NEGATIVE for significant arthralgias or myalgia  NEURO: NEGATIVE for weakness, dizziness or paresthesias  ENDOCRINE: NEGATIVE for temperature intolerance, skin/hair changes  HEME: NEGATIVE for bleeding problems  PSYCHIATRIC: NEGATIVE for changes in mood or affect    Patient Active Problem List    Diagnosis Date Noted     Gross hematuria 03/25/2021     Priority: Medium     Added automatically from request for surgery [id number]       Hydronephrosis, right 03/25/2021     Priority: Medium     Added automatically from request for surgery [id number]       Marijuana use 02/21/2018     Priority: Medium     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     No Known Allergies     Social History     Tobacco Use     Smoking status: Current Every Day Smoker     Packs/day: 1.00     Years: 20.00     Pack years: 20.00     Types: Cigarettes     Smokeless tobacco: Never Used   Substance Use Topics     Alcohol use: Yes     Frequency: 2-4 times a month     Drinks per session: 10 or more     History reviewed. No pertinent family history.  History   Drug Use     Types: Marijuana     Comment: Drug use: No         Objective     /80 (BP Location: Right arm, Patient Position: Sitting, Cuff Size: Adult Regular)   Pulse 85   Temp 98.2  F (36.8  C) (Tympanic)   Resp 19   Ht 1.778 m (5' 10\")   Wt 91.8 kg (202 lb 6.4 oz)   SpO2 100%   BMI 29.04 kg/m      Physical Exam    GENERAL APPEARANCE: healthy, alert and no distress     EYES: EOMI,  PERRL     HENT: ear canals and TM's normal and nose and mouth without ulcers or lesions     NECK: no adenopathy, no asymmetry, masses, or scars and thyroid normal to palpation     RESP: lungs clear to auscultation - no rales, rhonchi or wheezes     CV: regular rates and rhythm, normal S1 S2, no S3 or S4 and no murmur, click or rub     ABDOMEN:  soft, nontender, no HSM or masses and bowel sounds normal     MS: extremities normal- no gross deformities noted, no evidence of inflammation in joints, FROM in all extremities.     SKIN: no suspicious lesions or rashes     NEURO: Normal strength and tone, sensory exam grossly normal, mentation intact and speech normal     PSYCH: mentation appears normal. and affect normal/bright     LYMPHATICS: No cervical adenopathy    Recent Labs   Lab Test 03/18/21  0505   HGB 11.1*      *   POTASSIUM 3.9   CR 1.26      Diagnostics:  Recent Results (from the past 24 hour(s))   Hemoglobin    Collection Time: 03/29/21  1:03 PM   Result Value Ref Range    Hemoglobin 10.3 (L) 13.3 - 17.7 g/dL   Basic Metabolic Panel    Collection Time: " 03/29/21  1:03 PM   Result Value Ref Range    Sodium 134 134 - 144 mmol/L    Potassium 4.0 3.5 - 5.1 mmol/L    Chloride 104 98 - 107 mmol/L    Carbon Dioxide 27 21 - 31 mmol/L    Anion Gap 3 3 - 14 mmol/L    Glucose 192 (H) 70 - 105 mg/dL    Urea Nitrogen 14 7 - 25 mg/dL    Creatinine 1.41 (H) 0.70 - 1.30 mg/dL    GFR Estimate 51 (L) >60 mL/min/[1.73_m2]    GFR Estimate If Black 62 >60 mL/min/[1.73_m2]    Calcium 8.8 8.6 - 10.3 mg/dL   EKG 12-lead complete w/read - Clinics    Collection Time: 03/29/21  1:06 PM   Result Value Ref Range    Interpretation ECG Click View Image link to view waveform and result       EKG required for history of first-degree AV block and not completed in the last 90 days.   EKG personally reviewed and shows normal sinus rhythm  Ventricular rate 79 bpm  SC interval 188ms  QRS duration 82 ms  QT/QTc 378/433 ms  PRT axis 53 57 56    Previous EKG 8/14/2017  Rate 80.  First-degree AV block with SC measuring 256 ms.  Otherwise normal EKG.  No previous for comparison Chris Paez MD     Revised Cardiac Risk Index (RCRI):  The patient has the following serious cardiovascular risks for perioperative complications:   - No serious cardiac risks = 0 points     RCRI Interpretation: 0 points: Class I (very low risk - 0.4% complication rate)    Signed Electronically by: TATIANNA Dick  I reviewed copy of this evaluation report is provided to requesting physician.

## 2021-03-30 LAB — INTERPRETATION ECG - MUSE: NORMAL

## 2021-04-02 ENCOUNTER — ALLIED HEALTH/NURSE VISIT (OUTPATIENT)
Dept: FAMILY MEDICINE | Facility: OTHER | Age: 61
End: 2021-04-02
Attending: UROLOGY
Payer: COMMERCIAL

## 2021-04-02 DIAGNOSIS — Z20.822 COVID-19 RULED OUT: Primary | ICD-10-CM

## 2021-04-02 LAB
SARS-COV-2 RNA RESP QL NAA+PROBE: NORMAL
SPECIMEN SOURCE: NORMAL

## 2021-04-02 PROCEDURE — U0005 INFEC AGEN DETEC AMPLI PROBE: HCPCS | Mod: ZL | Performed by: UROLOGY

## 2021-04-02 PROCEDURE — C9803 HOPD COVID-19 SPEC COLLECT: HCPCS

## 2021-04-02 PROCEDURE — U0003 INFECTIOUS AGENT DETECTION BY NUCLEIC ACID (DNA OR RNA); SEVERE ACUTE RESPIRATORY SYNDROME CORONAVIRUS 2 (SARS-COV-2) (CORONAVIRUS DISEASE [COVID-19]), AMPLIFIED PROBE TECHNIQUE, MAKING USE OF HIGH THROUGHPUT TECHNOLOGIES AS DESCRIBED BY CMS-2020-01-R: HCPCS | Mod: ZL | Performed by: UROLOGY

## 2021-04-03 LAB
LABORATORY COMMENT REPORT: NORMAL
SARS-COV-2 RNA RESP QL NAA+PROBE: NEGATIVE
SPECIMEN SOURCE: NORMAL

## 2021-04-05 ENCOUNTER — ANESTHESIA EVENT (OUTPATIENT)
Dept: SURGERY | Facility: OTHER | Age: 61
End: 2021-04-05
Payer: COMMERCIAL

## 2021-04-06 ENCOUNTER — HOSPITAL ENCOUNTER (OUTPATIENT)
Dept: GENERAL RADIOLOGY | Facility: OTHER | Age: 61
End: 2021-04-06
Attending: UROLOGY | Admitting: UROLOGY
Payer: COMMERCIAL

## 2021-04-06 ENCOUNTER — HOSPITAL ENCOUNTER (OUTPATIENT)
Facility: OTHER | Age: 61
Discharge: HOME | End: 2021-04-06
Attending: UROLOGY | Admitting: UROLOGY
Payer: COMMERCIAL

## 2021-04-06 ENCOUNTER — ANESTHESIA (OUTPATIENT)
Dept: SURGERY | Facility: OTHER | Age: 61
End: 2021-04-06
Payer: COMMERCIAL

## 2021-04-06 VITALS
OXYGEN SATURATION: 98 % | HEIGHT: 70 IN | RESPIRATION RATE: 16 BRPM | TEMPERATURE: 97.5 F | BODY MASS INDEX: 28.92 KG/M2 | DIASTOLIC BLOOD PRESSURE: 85 MMHG | SYSTOLIC BLOOD PRESSURE: 130 MMHG | HEART RATE: 72 BPM | WEIGHT: 202 LBS

## 2021-04-06 DIAGNOSIS — R31.0 GROSS HEMATURIA: ICD-10-CM

## 2021-04-06 DIAGNOSIS — N13.30 HYDRONEPHROSIS, RIGHT: ICD-10-CM

## 2021-04-06 DIAGNOSIS — N20.0 KIDNEY STONE: ICD-10-CM

## 2021-04-06 PROCEDURE — C1894 INTRO/SHEATH, NON-LASER: HCPCS | Performed by: UROLOGY

## 2021-04-06 PROCEDURE — 250N000009 HC RX 250: Performed by: UROLOGY

## 2021-04-06 PROCEDURE — 250N000011 HC RX IP 250 OP 636: Performed by: UROLOGY

## 2021-04-06 PROCEDURE — 88112 CYTOPATH CELL ENHANCE TECH: CPT

## 2021-04-06 PROCEDURE — 258N000001 HC RX 258: Performed by: UROLOGY

## 2021-04-06 PROCEDURE — 360N000082 HC SURGERY LEVEL 2 W/ FLUORO, PER MIN: Performed by: UROLOGY

## 2021-04-06 PROCEDURE — 74420 UROGRAPHY RTRGR +-KUB: CPT | Mod: 26 | Performed by: UROLOGY

## 2021-04-06 PROCEDURE — C1769 GUIDE WIRE: HCPCS | Performed by: UROLOGY

## 2021-04-06 PROCEDURE — 255N000002 HC RX 255 OP 636: Performed by: UROLOGY

## 2021-04-06 PROCEDURE — 258N000003 HC RX IP 258 OP 636: Performed by: NURSE ANESTHETIST, CERTIFIED REGISTERED

## 2021-04-06 PROCEDURE — 710N000012 HC RECOVERY PHASE 2, PER MINUTE: Performed by: UROLOGY

## 2021-04-06 PROCEDURE — 999N000141 HC STATISTIC PRE-PROCEDURE NURSING ASSESSMENT: Performed by: UROLOGY

## 2021-04-06 PROCEDURE — 272N000001 HC OR GENERAL SUPPLY STERILE: Performed by: UROLOGY

## 2021-04-06 PROCEDURE — 370N000017 HC ANESTHESIA TECHNICAL FEE, PER MIN: Performed by: UROLOGY

## 2021-04-06 PROCEDURE — 52351 CYSTOURETERO & OR PYELOSCOPE: CPT | Performed by: UROLOGY

## 2021-04-06 PROCEDURE — 999N000180 XR SURGERY CARM FLUORO LESS THAN 5 MIN: Mod: TC

## 2021-04-06 PROCEDURE — C1758 CATHETER, URETERAL: HCPCS | Performed by: UROLOGY

## 2021-04-06 PROCEDURE — 250N000009 HC RX 250: Performed by: NURSE ANESTHETIST, CERTIFIED REGISTERED

## 2021-04-06 PROCEDURE — 52351 CYSTOURETERO & OR PYELOSCOPE: CPT | Performed by: NURSE ANESTHETIST, CERTIFIED REGISTERED

## 2021-04-06 PROCEDURE — 250N000011 HC RX IP 250 OP 636: Performed by: NURSE ANESTHETIST, CERTIFIED REGISTERED

## 2021-04-06 RX ORDER — LIDOCAINE 40 MG/G
CREAM TOPICAL
Status: DISCONTINUED | OUTPATIENT
Start: 2021-04-06 | End: 2021-04-06 | Stop reason: HOSPADM

## 2021-04-06 RX ORDER — ONDANSETRON 2 MG/ML
4 INJECTION INTRAMUSCULAR; INTRAVENOUS EVERY 30 MIN PRN
Status: DISCONTINUED | OUTPATIENT
Start: 2021-04-06 | End: 2021-04-06 | Stop reason: HOSPADM

## 2021-04-06 RX ORDER — FENTANYL CITRATE 50 UG/ML
25-50 INJECTION, SOLUTION INTRAMUSCULAR; INTRAVENOUS
Status: DISCONTINUED | OUTPATIENT
Start: 2021-04-06 | End: 2021-04-06 | Stop reason: HOSPADM

## 2021-04-06 RX ORDER — PROPOFOL 10 MG/ML
INJECTION, EMULSION INTRAVENOUS CONTINUOUS PRN
Status: DISCONTINUED | OUTPATIENT
Start: 2021-04-06 | End: 2021-04-06

## 2021-04-06 RX ORDER — CEFTRIAXONE SODIUM 1 G/50ML
1 INJECTION, SOLUTION INTRAVENOUS
Status: COMPLETED | OUTPATIENT
Start: 2021-04-06 | End: 2021-04-06

## 2021-04-06 RX ORDER — HYDROCODONE BITARTRATE AND ACETAMINOPHEN 5; 325 MG/1; MG/1
1-2 TABLET ORAL EVERY 4 HOURS PRN
Status: DISCONTINUED | OUTPATIENT
Start: 2021-04-06 | End: 2021-04-06 | Stop reason: HOSPADM

## 2021-04-06 RX ORDER — NALOXONE HYDROCHLORIDE 0.4 MG/ML
0.2 INJECTION, SOLUTION INTRAMUSCULAR; INTRAVENOUS; SUBCUTANEOUS
Status: DISCONTINUED | OUTPATIENT
Start: 2021-04-06 | End: 2021-04-06 | Stop reason: HOSPADM

## 2021-04-06 RX ORDER — ONDANSETRON 2 MG/ML
INJECTION INTRAMUSCULAR; INTRAVENOUS PRN
Status: DISCONTINUED | OUTPATIENT
Start: 2021-04-06 | End: 2021-04-06

## 2021-04-06 RX ORDER — PROPOFOL 10 MG/ML
INJECTION, EMULSION INTRAVENOUS PRN
Status: DISCONTINUED | OUTPATIENT
Start: 2021-04-06 | End: 2021-04-06

## 2021-04-06 RX ORDER — NALOXONE HYDROCHLORIDE 0.4 MG/ML
0.4 INJECTION, SOLUTION INTRAMUSCULAR; INTRAVENOUS; SUBCUTANEOUS
Status: DISCONTINUED | OUTPATIENT
Start: 2021-04-06 | End: 2021-04-06 | Stop reason: HOSPADM

## 2021-04-06 RX ORDER — MEPERIDINE HYDROCHLORIDE 50 MG/ML
12.5 INJECTION INTRAMUSCULAR; INTRAVENOUS; SUBCUTANEOUS
Status: DISCONTINUED | OUTPATIENT
Start: 2021-04-06 | End: 2021-04-06 | Stop reason: HOSPADM

## 2021-04-06 RX ORDER — SODIUM CHLORIDE 9 MG/ML
INJECTION, SOLUTION INTRAVENOUS CONTINUOUS
Status: DISCONTINUED | OUTPATIENT
Start: 2021-04-06 | End: 2021-04-06 | Stop reason: HOSPADM

## 2021-04-06 RX ORDER — HYDROMORPHONE HYDROCHLORIDE 1 MG/ML
.3-.5 INJECTION, SOLUTION INTRAMUSCULAR; INTRAVENOUS; SUBCUTANEOUS EVERY 10 MIN PRN
Status: DISCONTINUED | OUTPATIENT
Start: 2021-04-06 | End: 2021-04-06 | Stop reason: HOSPADM

## 2021-04-06 RX ORDER — ONDANSETRON 4 MG/1
4 TABLET, ORALLY DISINTEGRATING ORAL EVERY 30 MIN PRN
Status: DISCONTINUED | OUTPATIENT
Start: 2021-04-06 | End: 2021-04-06 | Stop reason: HOSPADM

## 2021-04-06 RX ORDER — LIDOCAINE HYDROCHLORIDE 20 MG/ML
INJECTION, SOLUTION INFILTRATION; PERINEURAL PRN
Status: DISCONTINUED | OUTPATIENT
Start: 2021-04-06 | End: 2021-04-06

## 2021-04-06 RX ORDER — LIDOCAINE HYDROCHLORIDE 20 MG/ML
JELLY TOPICAL PRN
Status: DISCONTINUED | OUTPATIENT
Start: 2021-04-06 | End: 2021-04-06 | Stop reason: HOSPADM

## 2021-04-06 RX ADMIN — ONDANSETRON 4 MG: 2 INJECTION INTRAMUSCULAR; INTRAVENOUS at 11:53

## 2021-04-06 RX ADMIN — PROPOFOL 140 MCG/KG/MIN: 10 INJECTION, EMULSION INTRAVENOUS at 11:51

## 2021-04-06 RX ADMIN — PROPOFOL 100 MG: 10 INJECTION, EMULSION INTRAVENOUS at 11:49

## 2021-04-06 RX ADMIN — CEFTRIAXONE SODIUM 1 G: 1 INJECTION, SOLUTION INTRAVENOUS at 11:48

## 2021-04-06 RX ADMIN — MIDAZOLAM 2 MG: 1 INJECTION INTRAMUSCULAR; INTRAVENOUS at 11:45

## 2021-04-06 RX ADMIN — LIDOCAINE HYDROCHLORIDE 60 MG: 20 INJECTION, SOLUTION INFILTRATION; PERINEURAL at 11:49

## 2021-04-06 RX ADMIN — SODIUM CHLORIDE: 9 INJECTION, SOLUTION INTRAVENOUS at 09:51

## 2021-04-06 ASSESSMENT — LIFESTYLE VARIABLES: TOBACCO_USE: 1

## 2021-04-06 ASSESSMENT — MIFFLIN-ST. JEOR: SCORE: 1727.52

## 2021-04-06 NOTE — ANESTHESIA POSTPROCEDURE EVALUATION
Patient: Julius Hansen    Procedure(s):  Cystoscopy and right retrograde pyelogram and diagnostic ureteroscopy    Diagnosis:Gross hematuria [R31.0]  Hydronephrosis, right [N13.30]  Diagnosis Additional Information: No value filed.    Anesthesia Type:  MAC    Note:  Disposition: Outpatient   Postop Pain Control: Uneventful            Sign Out: Well controlled pain   PONV: No   Neuro/Psych: Uneventful            Sign Out: Acceptable/Baseline neuro status   Airway/Respiratory: Uneventful            Sign Out: Acceptable/Baseline resp. status   CV/Hemodynamics: Uneventful            Sign Out: Acceptable CV status   Other NRE: NONE   DID A NON-ROUTINE EVENT OCCUR? No         Last vitals:  Vitals:    04/06/21 0936 04/06/21 1215   BP: (!) 131/102 130/85   Pulse:  72   Resp: 18 16   Temp: 97.2  F (36.2  C) 97.5  F (36.4  C)   SpO2: 99% 98%       Last vitals prior to Anesthesia Care Transfer:  CRNA VITALS  4/6/2021 1143 - 4/6/2021 1243      4/6/2021             Resp Rate (set):  10          Electronically Signed By: GABRIEL ARSHAD CRNA  April 6, 2021  2:07 PM

## 2021-04-06 NOTE — DISCHARGE INSTRUCTIONS
[facility]  Adult Discharge Orders & Instructions    ________________________________________________________________          For 12 hours after surgery  1. Get plenty of rest.  A responsible adult must stay with you for at least 12 hours after you leave the hospital.   2. You may feel lightheaded.  IF so, sit for a few minutes before standing.  Have someone help you get up.   3. You may have a slight fever. Call the doctor if your fever is over 101 F (38.3 C) (taken under the tongue) or lasts longer than 24 hours.  4. You may have a dry mouth, a sore throat, muscle aches or trouble sleeping.  These should go away after 24 hours.  5. Do not make important or legal decisions.  6.   Do not drive or use heavy equipment.  If you have weakness or tingling, don't drive or use heavy equipment until this feeling goes away.    To contact a doctor, call   [phone]_______________________

## 2021-04-06 NOTE — OP NOTE
Preoperative diagnosis  Right hydronephrosis    Postoperative diagnosis  Right hydronephrosis  Right UPJ Obstruction    Procedure performed  Right retrograde pyelogram and diagnostic ureteroscopy  Interpretation of retrograde    Surgeon  Trevon Montalvo MD    Surgeon(s)/Proceduralist(s) and Assistants (if any)  Surgeon(s):  Trevon Montalvo MD  Circulator: Isak Moyer RN  Relief Circulator: Selina Case RN  Relief Scrub: Leslie Jones  Scrub Person: Willard Matson  X-Ray Technologist: Hilda Hodgson  First Assistant: Karen Amaro RN    Specimen(s)  Yes  Stone fragments for biochemical analysis    (EBL) Estimated blood loss (ml)  0    Anesthesia  General    Complications  None    Findings  Cystoscopy revealed no tumors, stones or other mucosal abnormalities.  Right retrograde pyelogram revealed a narrowed UPJ.  No evidence of a mass or stone.  When the flexible scope met resistance I did not pass the scope beyond the distal portion of the upper ureter.    Ureteroscopy revealed normal urothelium.  No evidence of tumors.    Indications  61 year old male agreed to undergo the above named procedure after discussion of the alternatives, risks and benefits.    Informed consent was obtained.      Procedure  The patient was taken to the operating room and placed supine on the operating table.  Pre-operative antibiotics were administered.  Bilateral lower extremity SCDs were placed.  After induction of general anesthesia the patient was positioned in dorsal lithotomy, prepped and draped in a sterile fashion.  A time-out was performed.      I passed a 14 Sudanese flexible cystoscope carefully via urethra into the bladder.  The right ureteral orifice was identified and a Sensor wire was passed retrograde to the level of the kidney and confirmed by fluoroscopy.  The flexible scope was off-loaded and the bladder emptied with a straight catheter.  An 8-10 coaxial dilator was passed without difficulty and then  removed.  The MR6A semirigid ureteroscope was passed carefully along the Sensor wire through the urethra and into the distal and mid ureter.  I performed a retrograde pyelogram through the semirigid scope then removed the scope over a Superstff wire which was passed to the level of the kidney confirmed by fluoroscopy.  The ureteroscope was withdrawn.      The Olympus URF-P6 flexible ureteroscope was passed over a wire but met resistance just prior to the area of obstruction.    A retrograde was performed through the scope with findings described above.  Urine was collected through the scope for cytology.    The bladder was emptied and the procedure was complete.   The patient tolerated the procedure well and was stable throughout    Plan  Follow up in clinic as needed

## 2021-04-06 NOTE — ANESTHESIA CARE TRANSFER NOTE
Patient: Julius Hansen    Procedure(s):  Cystoscopy and right retrograde pyelogram and diagnostic ureteroscopy    Diagnosis: Gross hematuria [R31.0]  Hydronephrosis, right [N13.30]  Diagnosis Additional Information: No value filed.    Anesthesia Type:   MAC     Note:      Level of Consciousness: drowsy  Oxygen Supplementation: face mask  Level of Supplemental Oxygen (L/min / FiO2): 8  Independent Airway: airway patency satisfactory and stable  Dentition: dentition unchanged  Vital Signs Stable: post-procedure vital signs reviewed and stable  Report to RN Given: handoff report given  Patient transferred to: Phase II    Handoff Report: Identifed the Patient, Identified the Reponsible Provider, Reviewed the pertinent medical history, Discussed the surgical course, Reviewed Intra-OP anesthesia mangement and issues during anesthesia, Set expectations for post-procedure period and Allowed opportunity for questions and acknowledgement of understanding      Vitals: (Last set prior to Anesthesia Care Transfer)  [facility] VITALS  4/6/2021 1143 - 4/6/2021 1217      4/6/2021             Resp Rate (set):  10        Electronically Signed By: GABRIEL ARSHAD CRNA  April 6, 2021  12:17 PM

## 2021-04-06 NOTE — ANESTHESIA PREPROCEDURE EVALUATION
Anesthesia Pre-Procedure Evaluation    Patient: Julius Hansen   MRN: [id number] : 1960        Preoperative Diagnosis: Gross hematuria [R31.0]  Hydronephrosis, right [N13.30]   Procedure : Procedure(s):  Cystoscopy and right retrograde pyelogram and diagnostic ureteroscopy     Past Medical History:   Diagnosis Date     Personal history of other medical treatment (CODE)     No Comments Provided      Past Surgical History:   Procedure Laterality Date     OTHER SURGICAL HISTORY      [id number],NO PREVIOUS SURGERY      No Known Allergies   Social History     Tobacco Use     Smoking status: Current Every Day Smoker     Packs/day: 1.00     Years: 20.00     Pack years: 20.00     Types: Cigarettes     Smokeless tobacco: Never Used   Substance Use Topics     Alcohol use: Yes     Frequency: 2-4 times a month     Drinks per session: 10 or more      Wt Readings from Last 1 Encounters:   21 91.6 kg (202 lb)        Anesthesia Evaluation   Pt has had prior anesthetic.     No history of anesthetic complications       ROS/MED HX  ENT/Pulmonary:     (+) tobacco use, Current use, 1 packs/day,     Neurologic: Comment: Facial paralysis/[place] palsy      Cardiovascular:       METS/Exercise Tolerance: >4 METS    Hematologic:  - neg hematologic  ROS     Musculoskeletal:  - neg musculoskeletal ROS     GI/Hepatic:  - neg GI/hepatic ROS     Renal/Genitourinary:     (+) renal disease,     Endo:  - neg endo ROS     Psychiatric/Substance Use:     (+) alcohol abuse Recreational drug usage: Cannabis.    Infectious Disease:  - neg infectious disease ROS     Malignancy:  - neg malignancy ROS     Other:            Physical Exam    Airway  airway exam normal      Mallampati: I   TM distance: > 3 FB   Neck ROM: full   Mouth opening: > 3 cm    Respiratory Devices and Support         Dental       (+) upper dentures and lower dentures      Cardiovascular   cardiovascular exam normal          Pulmonary   pulmonary exam normal                 OUTSIDE LABS:  CBC:   Lab Results   Component Value Date    WBC 8.7 03/18/2021    WBC 18.3 (H) 01/18/2014    HGB 10.3 (L) 03/29/2021    HGB 11.1 (L) 03/18/2021    HCT 34.8 (L) 03/18/2021    HCT 41.1 08/14/2017     03/18/2021     08/14/2017     BMP:   Lab Results   Component Value Date     03/29/2021     (L) 03/18/2021    POTASSIUM 4.0 03/29/2021    POTASSIUM 3.9 03/18/2021    CHLORIDE 104 03/29/2021    CHLORIDE 100 03/18/2021    CO2 27 03/29/2021    CO2 22 03/18/2021    BUN 14 03/29/2021    BUN 15 03/18/2021    CR 1.41 (H) 03/29/2021    CR 1.26 03/18/2021     (H) 03/29/2021     (H) 03/18/2021     COAGS:   Lab Results   Component Value Date    PTT 29 08/14/2017    INR 1.2 08/14/2017     POC: No results found for: BGM, HCG, HCGS  HEPATIC:   Lab Results   Component Value Date    ALBUMIN 4.0 03/18/2021    PROTTOTAL 7.6 03/18/2021    ALT 13 03/18/2021    AST 20 03/18/2021    ALKPHOS 107 (H) 03/18/2021    BILITOTAL 0.6 03/18/2021     OTHER:   Lab Results   Component Value Date    LACT 0.7 03/18/2021    SANDY 8.8 03/29/2021    MAG 2.0 08/13/2017    CRP 68.0 (H) 01/18/2014    SED 21 (H) 01/18/2014       Anesthesia Plan    ASA Status:  2   NPO Status:  NPO Appropriate    Anesthesia Type: MAC.   Induction: Intravenous.   Maintenance: Balanced.        Consents    Anesthesia Plan(s) and associated risks, benefits, and realistic alternatives discussed. Questions answered and patient/representative(s) expressed understanding.     - Discussed with:  Patient      - Extended Intubation/Ventilatory Support Discussed: No.      - Patient is DNR/DNI Status: No    Use of blood products discussed: No .     Postoperative Care    Pain management: IV analgesics.   PONV prophylaxis: Ondansetron (or other 5HT-3)     Comments:    Risks, benefits and alternatives discussed and would like to proceed. General anesthesia ok if indicated.               GABRIEL Vazquez CRNA

## 2021-09-14 ENCOUNTER — OFFICE VISIT (OUTPATIENT)
Dept: FAMILY MEDICINE | Facility: OTHER | Age: 61
End: 2021-09-14
Attending: PHYSICIAN ASSISTANT
Payer: COMMERCIAL

## 2021-09-14 VITALS
HEART RATE: 96 BPM | RESPIRATION RATE: 20 BRPM | HEIGHT: 70 IN | SYSTOLIC BLOOD PRESSURE: 140 MMHG | OXYGEN SATURATION: 98 % | TEMPERATURE: 98.7 F | BODY MASS INDEX: 28.8 KG/M2 | DIASTOLIC BLOOD PRESSURE: 78 MMHG | WEIGHT: 201.2 LBS

## 2021-09-14 DIAGNOSIS — Z13.1 SCREENING FOR DIABETES MELLITUS: ICD-10-CM

## 2021-09-14 DIAGNOSIS — Z13.220 SCREENING CHOLESTEROL LEVEL: ICD-10-CM

## 2021-09-14 DIAGNOSIS — R05.9 COUGH: ICD-10-CM

## 2021-09-14 DIAGNOSIS — R03.0 ELEVATED BLOOD PRESSURE READING WITHOUT DIAGNOSIS OF HYPERTENSION: ICD-10-CM

## 2021-09-14 DIAGNOSIS — D64.9 LOW HEMOGLOBIN: ICD-10-CM

## 2021-09-14 DIAGNOSIS — Z00.00 ROUTINE HISTORY AND PHYSICAL EXAMINATION OF ADULT: Primary | ICD-10-CM

## 2021-09-14 DIAGNOSIS — R31.0 GROSS HEMATURIA: ICD-10-CM

## 2021-09-14 DIAGNOSIS — Z87.448 HISTORY OF HYDRONEPHROSIS: ICD-10-CM

## 2021-09-14 DIAGNOSIS — Z12.11 SPECIAL SCREENING FOR MALIGNANT NEOPLASMS, COLON: ICD-10-CM

## 2021-09-14 DIAGNOSIS — R79.89 ELEVATED SERUM CREATININE: ICD-10-CM

## 2021-09-14 LAB
ALBUMIN SERPL-MCNC: 3.8 G/DL (ref 3.5–5.7)
ALBUMIN UR-MCNC: 30 MG/DL
ALP SERPL-CCNC: 98 U/L (ref 34–104)
ALT SERPL W P-5'-P-CCNC: 14 U/L (ref 7–52)
ANION GAP SERPL CALCULATED.3IONS-SCNC: 6 MMOL/L (ref 3–14)
APPEARANCE UR: CLEAR
AST SERPL W P-5'-P-CCNC: 20 U/L (ref 13–39)
BASOPHILS # BLD AUTO: 0 10E3/UL (ref 0–0.2)
BASOPHILS NFR BLD AUTO: 1 %
BILIRUB SERPL-MCNC: 0.3 MG/DL (ref 0.3–1)
BILIRUB UR QL STRIP: NEGATIVE
BUN SERPL-MCNC: 20 MG/DL (ref 7–25)
CALCIUM SERPL-MCNC: 9.4 MG/DL (ref 8.6–10.3)
CHLORIDE BLD-SCNC: 105 MMOL/L (ref 98–107)
CHOLEST SERPL-MCNC: 164 MG/DL
CO2 SERPL-SCNC: 26 MMOL/L (ref 21–31)
COLOR UR AUTO: YELLOW
CREAT SERPL-MCNC: 1.49 MG/DL (ref 0.7–1.3)
EOSINOPHIL # BLD AUTO: 0.1 10E3/UL (ref 0–0.7)
EOSINOPHIL NFR BLD AUTO: 1 %
ERYTHROCYTE [DISTWIDTH] IN BLOOD BY AUTOMATED COUNT: 17.6 % (ref 10–15)
FASTING STATUS PATIENT QL REPORTED: ABNORMAL
GFR SERPL CREATININE-BSD FRML MDRD: 50 ML/MIN/1.73M2
GLUCOSE BLD-MCNC: 79 MG/DL (ref 70–105)
GLUCOSE UR STRIP-MCNC: NEGATIVE MG/DL
GROUP A STREP BY PCR: NOT DETECTED
HCT VFR BLD AUTO: 32 % (ref 40–53)
HDLC SERPL-MCNC: 45 MG/DL (ref 23–92)
HGB BLD-MCNC: 9.7 G/DL (ref 13.3–17.7)
HGB UR QL STRIP: ABNORMAL
IMM GRANULOCYTES # BLD: 0 10E3/UL
IMM GRANULOCYTES NFR BLD: 0 %
KETONES UR STRIP-MCNC: NEGATIVE MG/DL
LDLC SERPL CALC-MCNC: 73 MG/DL
LEUKOCYTE ESTERASE UR QL STRIP: NEGATIVE
LYMPHOCYTES # BLD AUTO: 1.7 10E3/UL (ref 0.8–5.3)
LYMPHOCYTES NFR BLD AUTO: 20 %
MCH RBC QN AUTO: 21.4 PG (ref 26.5–33)
MCHC RBC AUTO-ENTMCNC: 30.3 G/DL (ref 31.5–36.5)
MCV RBC AUTO: 71 FL (ref 78–100)
MONOCYTES # BLD AUTO: 0.8 10E3/UL (ref 0–1.3)
MONOCYTES NFR BLD AUTO: 10 %
MUCOUS THREADS #/AREA URNS LPF: PRESENT /LPF
NEUTROPHILS # BLD AUTO: 5.7 10E3/UL (ref 1.6–8.3)
NEUTROPHILS NFR BLD AUTO: 68 %
NITRATE UR QL: NEGATIVE
NONHDLC SERPL-MCNC: 119 MG/DL
NRBC # BLD AUTO: 0 10E3/UL
NRBC BLD AUTO-RTO: 0 /100
PH UR STRIP: 5.5 [PH] (ref 5–9)
PLATELET # BLD AUTO: 325 10E3/UL (ref 150–450)
POTASSIUM BLD-SCNC: 4.2 MMOL/L (ref 3.5–5.1)
PROT SERPL-MCNC: 7.5 G/DL (ref 6.4–8.9)
RBC # BLD AUTO: 4.53 10E6/UL (ref 4.4–5.9)
RBC URINE: >182 /HPF
SODIUM SERPL-SCNC: 137 MMOL/L (ref 134–144)
SP GR UR STRIP: 1.03 (ref 1–1.03)
TRIGL SERPL-MCNC: 229 MG/DL
UROBILINOGEN UR STRIP-MCNC: NORMAL MG/DL
WBC # BLD AUTO: 8.4 10E3/UL (ref 4–11)
WBC URINE: 0 /HPF
YEAST #/AREA URNS HPF: ABNORMAL /HPF

## 2021-09-14 PROCEDURE — 81001 URINALYSIS AUTO W/SCOPE: CPT | Mod: ZL | Performed by: PHYSICIAN ASSISTANT

## 2021-09-14 PROCEDURE — 80053 COMPREHEN METABOLIC PANEL: CPT | Mod: ZL | Performed by: PHYSICIAN ASSISTANT

## 2021-09-14 PROCEDURE — 80061 LIPID PANEL: CPT | Mod: ZL | Performed by: PHYSICIAN ASSISTANT

## 2021-09-14 PROCEDURE — 85004 AUTOMATED DIFF WBC COUNT: CPT | Mod: ZL | Performed by: PHYSICIAN ASSISTANT

## 2021-09-14 PROCEDURE — C9803 HOPD COVID-19 SPEC COLLECT: HCPCS | Performed by: PHYSICIAN ASSISTANT

## 2021-09-14 PROCEDURE — 87086 URINE CULTURE/COLONY COUNT: CPT | Mod: ZL | Performed by: PHYSICIAN ASSISTANT

## 2021-09-14 PROCEDURE — 99396 PREV VISIT EST AGE 40-64: CPT | Performed by: PHYSICIAN ASSISTANT

## 2021-09-14 PROCEDURE — 36415 COLL VENOUS BLD VENIPUNCTURE: CPT | Mod: ZL | Performed by: PHYSICIAN ASSISTANT

## 2021-09-14 PROCEDURE — 87651 STREP A DNA AMP PROBE: CPT | Mod: ZL | Performed by: PHYSICIAN ASSISTANT

## 2021-09-14 PROCEDURE — U0003 INFECTIOUS AGENT DETECTION BY NUCLEIC ACID (DNA OR RNA); SEVERE ACUTE RESPIRATORY SYNDROME CORONAVIRUS 2 (SARS-COV-2) (CORONAVIRUS DISEASE [COVID-19]), AMPLIFIED PROBE TECHNIQUE, MAKING USE OF HIGH THROUGHPUT TECHNOLOGIES AS DESCRIBED BY CMS-2020-01-R: HCPCS | Mod: ZL | Performed by: PHYSICIAN ASSISTANT

## 2021-09-14 ASSESSMENT — ANXIETY QUESTIONNAIRES
GAD7 TOTAL SCORE: 0
7. FEELING AFRAID AS IF SOMETHING AWFUL MIGHT HAPPEN: NOT AT ALL
7. FEELING AFRAID AS IF SOMETHING AWFUL MIGHT HAPPEN: NOT AT ALL
5. BEING SO RESTLESS THAT IT IS HARD TO SIT STILL: NOT AT ALL
3. WORRYING TOO MUCH ABOUT DIFFERENT THINGS: NOT AT ALL
GAD7 TOTAL SCORE: 0
6. BECOMING EASILY ANNOYED OR IRRITABLE: NOT AT ALL
GAD7 TOTAL SCORE: 0
1. FEELING NERVOUS, ANXIOUS, OR ON EDGE: NOT AT ALL
4. TROUBLE RELAXING: NOT AT ALL
8. IF YOU CHECKED OFF ANY PROBLEMS, HOW DIFFICULT HAVE THESE MADE IT FOR YOU TO DO YOUR WORK, TAKE CARE OF THINGS AT HOME, OR GET ALONG WITH OTHER PEOPLE?: NOT DIFFICULT AT ALL
2. NOT BEING ABLE TO STOP OR CONTROL WORRYING: NOT AT ALL

## 2021-09-14 ASSESSMENT — PATIENT HEALTH QUESTIONNAIRE - PHQ9
SUM OF ALL RESPONSES TO PHQ QUESTIONS 1-9: 0
10. IF YOU CHECKED OFF ANY PROBLEMS, HOW DIFFICULT HAVE THESE PROBLEMS MADE IT FOR YOU TO DO YOUR WORK, TAKE CARE OF THINGS AT HOME, OR GET ALONG WITH OTHER PEOPLE: NOT DIFFICULT AT ALL
SUM OF ALL RESPONSES TO PHQ QUESTIONS 1-9: 0

## 2021-09-14 ASSESSMENT — MIFFLIN-ST. JEOR: SCORE: 1723.89

## 2021-09-14 ASSESSMENT — PAIN SCALES - GENERAL: PAINLEVEL: NO PAIN (0)

## 2021-09-14 NOTE — PROGRESS NOTES
"Nursing Notes:   Kishore Orourke LPN  9/14/2021  3:29 PM  Signed  Chief Complaint   Patient presents with     Physical     Also ER follow-up   Patient states she has a lump on his left mid rib cage. He also states his urine is red at times and a doctor told him he has a \"dead kidney.\" Patient states he has an itchy throat.    Initial BP (!) 156/84   Pulse 96   Temp 98.7  F (37.1  C) (Tympanic)   Resp 20   Ht 1.778 m (5' 10\")   Wt 91.3 kg (201 lb 3.2 oz)   SpO2 98%   BMI 28.87 kg/m   Estimated body mass index is 28.87 kg/m  as calculated from the following:    Height as of this encounter: 1.778 m (5' 10\").    Weight as of this encounter: 91.3 kg (201 lb 3.2 oz).  Medication Reconciliation: complete    FOOD SECURITY SCREENING QUESTIONS  Hunger Vital Signs:  Within the past 12 months we worried whether our food would run out before we got money to buy more. Never  Within the past 12 months the food we bought just didn't last and we didn't have money to get more. Never      Advanced Care Directive Reviewed    Kishore Orourke LPN      HPI: Julius Hansen who presents for a yearly exam.  Concerns include: Patient has history of gross hematuria and a kidney stone in the springtime.  Previously saw urology and had a cystoscopy and right retrograde pyelogram and diagnostic ureteroscopy.  Patient states that since then he feels bloated once in a while.  Symptoms vary.  The blood is not daily.  Approximately every 3 days.  Sometimes it last longer.  Has urinary frequency at night.  No dysuria.  No history of stones prior to this.  Drinking a lot of water.  Working 12-hour days.  States that he tends to drink 3-4 beers per night.  The blood has been present in his urine last couple years.    Patient has a lump on his left mid chest and a lump on his left superior head.  Would like to ensure that there are no concerns.    He has had itchy throat over the last 2 months.  States that his coworkers have it as well.  " Mild cough.  No wheezing, rattling, shortness of breath, fevers, chills.  No Covid-19 exposures.      STD concerns: no  Cholesterol/DM concerns/screening: no  Prostate cancer screening discussed:  Not indicated, patient is average risk and younger than 50.  Family history of colon or prostate CA?: no  Colonoscopy: none, would like one  Tobacco use: yes  Immunizations: UTD    Patient Active Problem List    Diagnosis Date Noted     Elevated blood pressure reading without diagnosis of hypertension 09/17/2021     Priority: Medium     Elevated serum creatinine 09/16/2021     Priority: Medium     Low hemoglobin 09/16/2021     Priority: Medium     Gross hematuria 03/25/2021     Priority: Medium     Added automatically from request for surgery [id number]       Hydronephrosis, right 03/25/2021     Priority: Medium     Added automatically from request for surgery [id number]       Marijuana use 02/21/2018     Priority: Medium     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     Priority: Medium     Lyme disease 08/15/2017     Priority: Medium     Tobacco dependence 08/14/2017     Priority: Medium       Past Medical History:   Diagnosis Date     Personal history of other medical treatment (CODE)     No Comments Provided       Past Surgical History:   Procedure Laterality Date     CYSTOSCOPY, URETEROSCOPY, COMBINED Right 04/06/2021    Procedure: Cystoscopy and right retrograde pyelogram and diagnostic ureteroscopy;  Surgeon: Trevon Montalvo MD;  Location:  OR       Family History   Problem Relation Age of Onset     No Known Problems Mother      Hypertension Father      Hyperlipidemia Father      Dementia Father      Lung Cancer Sister      No Known Problems Sister      No Known Problems Brother        Social History     Tobacco Use     Smoking status: Current Every Day Smoker     Packs/day: 1.00     Years: 20.00     Pack years: 20.00     Types: Cigarettes     Smokeless tobacco: Never Used   Substance Use Topics     Alcohol use: Yes      "Comment: \"every night I have a couple after work\"       Current Outpatient Medications   Medication Sig Dispense Refill     bisacodyl (DULCOLAX) 5 MG EC tablet Use as directed per colonoscopy prep. 2 tablet 0       No Known Allergies    REVIEW OF SYSTEMS:  Refer to HPI.    Physical Exam:  BP (!) 140/78 (BP Location: Right arm, Patient Position: Sitting, Cuff Size: Adult Large)   Pulse 96   Temp 98.7  F (37.1  C) (Tympanic)   Resp 20   Ht 1.778 m (5' 10\")   Wt 91.3 kg (201 lb 3.2 oz)   SpO2 98%   BMI 28.87 kg/m     CONSTITUTIONAL:  Alert, cooperative, NAD.  HEAD:  Normal. Normocephalic, atraumatic.  EYES:  Normal external eye, conjunctiva, lids.  No scleral icterus.  ENT/MOUTH:  External ears and nose normal.  TMs normal.  Moist mucous membranes. Oropharynx clear.   ENDO: No thyromegaly or thyroid nodules.  LYMPH:  Nocervical or supraclavicular LA.    CARDIOVASCULAR: Regular, S1, S2.  No S3 or S4.  No murmur/gallop/rub.  No peripheral edema.  RESPIRATORY: CTA bilaterally, no wheezes, rhonchi or rales.  GI: Bowel sounds wnl. Soft, nontender, nondistended.  No masses or HSM.  No rebound or guarding.  MSKEL: Grossly normal ROM.  No clubbing.  INTEGUMENTARY:  Warm, dry.  No rash noted on exposed skin.  Approx 5x5 cm nontender, mobile cyst appreciated on left superior head. NO overlying erythema, pus, drainage, warmth.   Approx 2x2 cm mobile lump on left lower lateral chest wall that is nontender to palpation. NO pus, drainage, warmth, erythema.   NEUROLOGIC:  Facies symmetric.  Grossly normal movement and tone.  No tremor.  PSYCHIATRIC:  Affect normal.  Speech fluent.       PHQ Depression Screen  PHQ-9 SCORE 8/21/2017 9/14/2021   PHQ-9 Total Score MyChart - 0   PHQ-9 Total Score 0 0       Labs  Results for orders placed or performed in visit on 09/14/21   UA reflex to Microscopic     Status: Abnormal   Result Value Ref Range    Color Urine Yellow Colorless, Straw, Light Yellow, Yellow    Appearance Urine Clear " Clear    Glucose Urine Negative Negative mg/dL    Bilirubin Urine Negative Negative    Ketones Urine Negative Negative mg/dL    Specific Gravity Urine 1.029 1.000 - 1.030    Blood Urine Large (A) Negative    pH Urine 5.5 5.0 - 9.0    Protein Albumin Urine 30  (A) Negative mg/dL    Urobilinogen Urine Normal Normal, 2.0 mg/dL    Nitrite Urine Negative Negative    Leukocyte Esterase Urine Negative Negative    RBC Urine >182 (H) <=2 /HPF    WBC Urine 0 <=5 /HPF    Budding Yeast Urine Few (A) None Seen /HPF    Mucus Urine Present (A) None Seen /LPF   Symptomatic COVID-19 Virus (Coronavirus) by PCR Nasopharyngeal     Status: Normal    Specimen: Nasopharyngeal; Swab   Result Value Ref Range    SARS CoV2 PCR Negative Negative    Narrative    Testing was performed using the tee SARS-CoV-2 assay on the tee  [employer] System. This test should be ordered for the detection of  SARS-CoV-2 in individuals who meet SARS-CoV-2 clinical and/or  epidemiological criteria. Test performance is unknown in asymptomatic  patients. This test is for in vitro diagnostic use under the FDA EUA  for laboratories certified under CLIA to perform high and/or moderate  complexity testing. This test has not been FDA cleared or approved. A  negative result does not rule out the presence of PCR inhibitors in  the specimen or target RNA in concentration below the limit of  detection for the assay. The possibility of a false negative should  be considered if the patient's recent exposure or clinical  presentation suggests COVID-19. This test was validated by the [facility]. This  laboratory is certified under the Clinical Laboratory Improvement  Amendments of 1988 (CLIA-88) as qualified to perform high and/or  moderate complexity laboratory testing.   Lipid Panel     Status: Abnormal   Result Value Ref Range    Cholesterol 164 <200 mg/dL    Triglycerides 229 (H) <150 mg/dL    Direct Measure HDL 45 23 - 92  mg/dL    LDL Cholesterol Calculated 73 <=100 mg/dL    Non HDL Cholesterol 119 <130 mg/dL    Patient Fasting > 8hrs? Unknown     Narrative    Cholesterol  Desirable:  <200 mg/dL    Triglycerides  Normal:  Less than 150 mg/dL  Borderline High:  150-199 mg/dL  High:  200-499 mg/dL  Very High:  Greater than or equal to 500 mg/dL    Direct Measure HDL  Female:  Greater than or equal to 50 mg/dL   Male:  Greater than or equal to 40 mg/dL    LDL Cholesterol  Desirable:  <100mg/dL  Above Desirable:  100-129 mg/dL   Borderline High:  130-159 mg/dL   High:  160-189 mg/dL   Very High:  >= 190 mg/dL    Non HDL Cholesterol  Desirable:  130 mg/dL  Above Desirable:  130-159 mg/dL  Borderline High:  160-189 mg/dL  High:  190-219 mg/dL  Very High:  Greater than or equal to 220 mg/dL   Comprehensive Metabolic Panel     Status: Abnormal   Result Value Ref Range    Sodium 137 134 - 144 mmol/L    Potassium 4.2 3.5 - 5.1 mmol/L    Chloride 105 98 - 107 mmol/L    Carbon Dioxide (CO2) 26 21 - 31 mmol/L    Anion Gap 6 3 - 14 mmol/L    Urea Nitrogen 20 7 - 25 mg/dL    Creatinine 1.49 (H) 0.70 - 1.30 mg/dL    Calcium 9.4 8.6 - 10.3 mg/dL    Glucose 79 70 - 105 mg/dL    Alkaline Phosphatase 98 34 - 104 U/L    AST 20 13 - 39 U/L    ALT 14 7 - 52 U/L    Protein Total 7.5 6.4 - 8.9 g/dL    Albumin 3.8 3.5 - 5.7 g/dL    Bilirubin Total 0.3 0.3 - 1.0 mg/dL    GFR Estimate 50 (L) >60 mL/min/1.73m2   CBC and Differential     Status: Abnormal    Narrative    The following orders were created for panel order CBC and Differential.  Procedure                               Abnormality         Status                     ---------                               -----------         ------                     CBC with platelets and d...[[id number]]  Abnormal            Final result                 Please view results for these tests on the individual orders.   CBC with platelets and differential     Status: Abnormal   Result Value Ref Range    WBC Count 8.4 4.0  - 11.0 10e3/uL    RBC Count 4.53 4.40 - 5.90 10e6/uL    Hemoglobin 9.7 (L) 13.3 - 17.7 g/dL    Hematocrit 32.0 (L) 40.0 - 53.0 %    MCV 71 (L) 78 - 100 fL    MCH 21.4 (L) 26.5 - 33.0 pg    MCHC 30.3 (L) 31.5 - 36.5 g/dL    RDW 17.6 (H) 10.0 - 15.0 %    Platelet Count 325 150 - 450 10e3/uL    % Neutrophils 68 %    % Lymphocytes 20 %    % Monocytes 10 %    % Eosinophils 1 %    % Basophils 1 %    % Immature Granulocytes 0 %    NRBCs per 100 WBC 0 <1 /100    Absolute Neutrophils 5.7 1.6 - 8.3 10e3/uL    Absolute Lymphocytes 1.7 0.8 - 5.3 10e3/uL    Absolute Monocytes 0.8 0.0 - 1.3 10e3/uL    Absolute Eosinophils 0.1 0.0 - 0.7 10e3/uL    Absolute Basophils 0.0 0.0 - 0.2 10e3/uL    Absolute Immature Granulocytes 0.0 <=0.0 10e3/uL    Absolute NRBCs 0.0 10e3/uL   Group A Streptococcus PCR Throat Swab     Status: Normal    Specimen: Throat; Swab   Result Value Ref Range    Group A strep by PCR Not Detected Not Detected    Narrative    The Xpert Xpress Strep A test, performed on the [employer], is a rapid, qualitative in vitro diagnostic test for the detection of Streptococcus pyogenes (Group A ß-hemolytic Streptococcus, Strep A) in throat swab specimens from patients with signs and symptoms of pharyngitis. The Xpert Xpress Strep A test can be used as an aid in the diagnosis of Group A Streptococcal pharyngitis. The assay is not intended to monitor treatment for Group A Streptococcus infections. The Xpert Xpress Strep A test utilizes an automated real-time polymerase chain reaction (PCR) to detect Streptococcus pyogenes DNA.   Urine Culture Aerobic Bacterial     Status: None    Specimen: Urine, Midstream   Result Value Ref Range    Culture No Growth        ASSESSMENT/PLAN:    ICD-10-CM    1. Routine history and physical examination of adult  Z00.00    2. Cough  R05 Symptomatic COVID-19 Virus (Coronavirus) by PCR Nasopharyngeal     Group A Streptococcus PCR Throat Swab   3. Gross hematuria  R31.0 UA reflex  to Microscopic     CBC and Differential     Comprehensive Metabolic Panel     Adult Urology Referral     Urine Culture Aerobic Bacterial     Comprehensive Metabolic Panel     CBC and Differential   4. Screening cholesterol level  Z13.220 Comprehensive Metabolic Panel     Lipid Panel     Lipid Panel     Comprehensive Metabolic Panel   5. Screening for diabetes mellitus  Z13.1 Comprehensive Metabolic Panel     Comprehensive Metabolic Panel   6. Special screening for malignant neoplasms, colon  Z12.11 Adult Gastro Ref - Procedure Only   7. Elevated serum creatinine  R79.89 Internal Medicine Referral     CANCELED: Internal Medicine Referral   8. Low hemoglobin  D64.9 Internal Medicine Referral   9. History of hydronephrosis  Z87.448 Internal Medicine Referral   10. Elevated blood pressure reading without diagnosis of hypertension  R03.0          Completed Covid-19 and strep test today which are negative.    Completed CBC, CMP, urinalysis, urine culture, and lipid profile.  Labs showed an elevated creatinine level at 1.49.  Hemoglobin was mildly decreased at 9.7.  Labs are otherwise stable.  Urine sample was positive for microscopic hematuria.  Urine culture is negative for infection.  Patient was referred to urology for consult due to the persistent gross hematuria.  Patient was also referred to internal medicine for consult with elevated creatinine and decreased hemoglobin level along with recent history of hydronephrosis.    Colon and prostate cancer screening discussed.      May use symptomatic care with tylenol or ibuprofen. Sudafed or mucinex work well for congestion. May use cough syrup or cough drops.  Using a humidifier works well to break up the congestion. You can also sleep propped up on a couple pillows to decrease symptoms at night.    Please take tylenol as needed up to 4 times daily.  Treat symptomatically with warm salt water gargles.  Lozenges, Tylenol, Advil or Aleve as needed. Frequent swallows of  "cool liquid.  Oatmeal coats the throat and some patients find it soothes the pain. Encouraged warm teas or fluids with honey.     If you have sinus congestion -  Use a Neti Pot/sinus flush (Jakob Med Sinus Rinse) 3 times daily to irrigate sinuses/mucosal tissue.     Monitor for any fevers or chills. Return in 7-10 days if not feeling better. Please call clinic with any questions or concerns. Return to clinic with change/worsening of symptoms.   Encouraged fluids and rest.    Call 9-1-1 or go to the emergency room if you:  Have trouble breathing   Are drooling because you cannot swallow your saliva   Have swelling of the neck or tongue   Cannot move your neck or have trouble opening your mouth    Healthy Strategies  1. Eat at least 3 meals a day and never skip breakfast.  2. Eat more slowly.  3. Decrease portion size.  4. Provide structure by using meal replacement bars or shakes, and/or low calorie frozen meals.  5. For good nutrition incorporate fruit, vegetables, whole grains, lean protein, and low-fat dairy.  6. Remove trigger foods fromyour environment to avoid impulse eating.  7. Increase physical activity: get a pedometer and aim for 10,000 steps a day or 30-35 minutes of activity 5 days per week.  8. Weigh yourself daily or at least weekly.  9. Keep a record of what you eat and your activity.  10. Establish a support system suchas a friend, group or program.    11. Read Abdi Gary's \"Eat to Live\". Remember it is important to have a minimum of 1200calories a day, okay to use olive oil, 40 grams of fiber daily. No more than two servings (the size of your palm) of red meat a week.     Please consider the following general health recommendations:    Eat a quality diet (generally, low in simple sugars, starches, cholesterol and saturated fat.)    Please get 1200 mg of calcium in divided doses with 800 units vitamin D in your diet daily. Take supplements as needed to obtain full recommended amounts.    Stay " physically active. Regular walking or other exercise is one of the best ways to minimize pain of arthritis; maintain independence and mobility; maintain bone strength; maintain conditioning of your heart. Find something you enjoy and a friend to do it with you.    Maintain ideal weight. Your Body mass index is Body mass index is 28.87 kg/m .. Generally a BMI of 20-25 is considered ideal. Overweight is defined as 25-30, obese is 30-35 and markedly obese is greater than 35.    Apply sun block (SPF 25 or greater) on exposed skin anytime you are out in the sun to prevent skin cancer.     Wear a seatbelt whenever you are in a car.    Obtain a flu shot every fall.    You should have a tetanus booster at least once every 10 years.    Colonoscopy (an exam of the colon) is recommended every 10 years after the age of 50 to screen for colon cancer. (More often if you are at increased risk.)    A vaccine to reduce your chances of getting shingles is available if you are over 50. Information about the vaccine is available through the clinic or at:  (https://www.cdc.gov/vaccines/vpd/shingles/public/shingrix/index.html)    Check blood sugar annually. Cholesterol annually unless you have had a normal level when last checked within 5 years.     I recommend thatyou have a general physical exam every year.       Blood pressure is elevated today. Encouraged to monitor blood pressure a couple times a week over the next few weeks. Return in 3-4 weeks if blood pressure is persistently elevated for a recheck appointment. Work on diet and exercise to decrease blood pressure. Weight loss is helpful.  Decrease salt intake.      -- Learn about DASH Diet (http://bit.[employer]/DASHDiet - redirects to the NIH) for dietary ways to reduce blood pressure    Counseled on healthy diet andexercise.    Patient Instructions   Sore throat:   Completed strep and COVID-19 test.   May use symptomatic care with tylenol or ibuprofen. Sudafed or mucinex work well for  "congestion. May use cough syrup or cough drops.  Using a humidifier works well to break up the congestion. You can also sleep propped up on a couple pillows to decrease symptoms at night.    Please take tylenol as needed up to 4 times daily.  Treat symptomatically with warm salt water gargles.  Lozenges, Tylenol, Advil or Aleve as needed. Frequent swallows of cool liquid.  Oatmeal coats the throat and some patients find it soothes the pain. Encouraged warm teas or fluids with honey.     If you have sinus congestion -  Use a Neti Pot/sinus flush (Jakob Med Sinus Rinse) 3 times daily to irrigate sinuses/mucosal tissue.     Monitor for any fevers or chills. Return in 7-10 days if not feeling better. Please call clinic with any questions or concerns. Return to clinic with change/worsening of symptoms.   Encouraged fluids and rest.    Call 9-1-1 or go to the emergency room if you:  Have trouble breathing   Are drooling because you cannot swallow your saliva   Have swelling of the neck or tongue   Cannot move your neck or have trouble opening your mouth    Healthy Strategies  11. Eat at least 3 meals a day and never skip breakfast.  12. Eat more slowly.  13. Decrease portion size.  14. Provide structure by using meal replacement bars or shakes, and/or low calorie frozen meals.  15. For good nutrition incorporate fruit, vegetables, whole grains, lean protein, and low-fat dairy.  16. Remove trigger foods fromyour environment to avoid impulse eating.  17. Increase physical activity: get a pedometer and aim for 10,000 steps a day or 30-35 minutes of activity 5 days per week.  18. Weigh yourself daily or at least weekly.  19. Keep a record of what you eat and your activity.  20. Establish a support system suchas a friend, group or program.    11. Read Abdi Gary's \"Eat to Live\". Remember it is important to have a minimum of 1200calories a day, okay to use olive oil, 40 grams of fiber daily. No more than two servings (the size " of your palm) of red meat a week.     Please consider the following general health recommendations:    Eat a quality diet (generally, low in simple sugars, starches, cholesterol and saturated fat.)    Please get 1200 mg of calcium in divided doses with 800 units vitamin D in your diet daily. Take supplements as needed to obtain full recommended amounts.    Stay physically active. Regular walking or other exercise is one of the best ways to minimize pain of arthritis; maintain independence and mobility; maintain bone strength; maintain conditioning of your heart. Find something you enjoy and a friend to do it with you.    Maintain ideal weight. Your Body mass index is Body mass index is 28.87 kg/m .. Generally a BMI of 20-25 is considered ideal. Overweight is defined as 25-30, obese is 30-35 and markedly obese is greater than 35.    Apply sun block (SPF 25 or greater) on exposed skin anytime you are out in the sun to prevent skin cancer.     Wear a seatbelt whenever you are in a car.    Obtain a flu shot every fall.    You should have a tetanus booster at least once every 10 years.    Colonoscopy (an exam of the colon) is recommended every 10 years after the age of 50 to screen for colon cancer. (More often if you are at increased risk.)    A vaccine to reduce your chances of getting shingles is available if you are over 50. Information about the vaccine is available through the clinic or at:  (https://www.cdc.gov/vaccines/vpd/shingles/public/shingrix/index.html)    Check blood sugar annually. Cholesterol annually unless you have had a normal level when last checked within 5 years.     I recommend thatyou have a general physical exam every year.       Blood pressure is elevated today. Encouraged to monitor blood pressure a couple times a week over the next few weeks. Return in 3-4 weeks if blood pressure is persistently elevated for a recheck appointment. Work on diet and exercise to decrease blood pressure. Weight  loss is helpful.  Decrease salt intake.      -- Learn about DASH Diet (http://bit.[employer]/DASHDiet - redirects to the NIH) for dietary ways to reduce blood pressure         Dorota Posada PA-C PA-C..................9/14/2021 1:54 PM       Answers for HPI/ROS submitted by the patient on 9/14/2021  If you checked off any problems, how difficult have these problems made it for you to do your work, take care of things at home, or get along with other people?: Not difficult at all  PHQ9 TOTAL SCORE: 0  DINORA 7 TOTAL SCORE: 0

## 2021-09-14 NOTE — LETTER
September 16, 2021      Julius Hansen  [address]   [facility] [address]        Dear ,    We are writing to inform you of your test results.    Your strep and COVID-19 test are negative.     Your white blood cell count, electrolytes, liver function, blood sugar, and cholesterol are stable.    Your urine was positive for blood.  Please follow-up with urology fur further workup.    Your kidney function was found to be mildly elevated and your hemoglobin was found to be mildly decreased.  I would recommend setting up a consult with internal medicine in our clinic to further discuss the labs to rule out concerns. A referral has been placed.     Resulted Orders   UA reflex to Microscopic   Result Value Ref Range    Color Urine Yellow Colorless, Straw, Light Yellow, Yellow    Appearance Urine Clear Clear    Glucose Urine Negative Negative mg/dL    Bilirubin Urine Negative Negative    Ketones Urine Negative Negative mg/dL    Specific Gravity Urine 1.029 1.000 - 1.030    Blood Urine Large (A) Negative    pH Urine 5.5 5.0 - 9.0    Protein Albumin Urine 30  (A) Negative mg/dL    Urobilinogen Urine Normal Normal, 2.0 mg/dL    Nitrite Urine Negative Negative    Leukocyte Esterase Urine Negative Negative    RBC Urine >182 (H) <=2 /HPF    WBC Urine 0 <=5 /HPF    Budding Yeast Urine Few (A) None Seen /HPF    Mucus Urine Present (A) None Seen /LPF   Symptomatic COVID-19 Virus (Coronavirus) by PCR Nasopharyngeal   Result Value Ref Range    SARS CoV2 PCR Negative Negative      Comment:      NEGATIVE: SARS-CoV-2 (COVID-19) RNA not detected, presumed negative.    Narrative    Testing was performed using the ete SARS-CoV-2 assay on the tee  [employer] System. This test should be ordered for the detection of  SARS-CoV-2 in individuals who meet SARS-CoV-2 clinical and/or  epidemiological criteria. Test performance is unknown in asymptomatic  patients. This test is for in vitro diagnostic use under the FDA EUA  for laboratories  certified under CLIA to perform high and/or moderate  complexity testing. This test has not been FDA cleared or approved. A  negative result does not rule out the presence of PCR inhibitors in  the specimen or target RNA in concentration below the limit of  detection for the assay. The possibility of a false negative should  be considered if the patient's recent exposure or clinical  presentation suggests COVID-19. This test was validated by the [facility]. This  laboratory is certified under the Clinical Laboratory Improvement  Amendments of 1988 (CLIA-88) as qualified to perform high and/or  moderate complexity laboratory testing.   Group A Streptococcus PCR Throat Swab   Result Value Ref Range    Group A strep by PCR Not Detected Not Detected    Narrative    The Xpert Xpress Strep A test, performed on the [employer], is a rapid, qualitative in vitro diagnostic test for the detection of Streptococcus pyogenes (Group A ß-hemolytic Streptococcus, Strep A) in throat swab specimens from patients with signs and symptoms of pharyngitis. The Xpert Xpress Strep A test can be used as an aid in the diagnosis of Group A Streptococcal pharyngitis. The assay is not intended to monitor treatment for Group A Streptococcus infections. The Xpert Xpress Strep A test utilizes an automated real-time polymerase chain reaction (PCR) to detect Streptococcus pyogenes DNA.   Urine Culture Aerobic Bacterial   Result Value Ref Range    Culture No Growth    Lipid Panel   Result Value Ref Range    Cholesterol 164 <200 mg/dL    Triglycerides 229 (H) <150 mg/dL    Direct Measure HDL 45 23 - 92 mg/dL    LDL Cholesterol Calculated 73 <=100 mg/dL    Non HDL Cholesterol 119 <130 mg/dL    Patient Fasting > 8hrs? Unknown     Narrative    Cholesterol  Desirable:  <200 mg/dL    Triglycerides  Normal:  Less than 150 mg/dL  Borderline High:  150-199 mg/dL  High:  200-499 mg/dL  Very  High:  Greater than or equal to 500 mg/dL    Direct Measure HDL  Female:  Greater than or equal to 50 mg/dL   Male:  Greater than or equal to 40 mg/dL    LDL Cholesterol  Desirable:  <100mg/dL  Above Desirable:  100-129 mg/dL   Borderline High:  130-159 mg/dL   High:  160-189 mg/dL   Very High:  >= 190 mg/dL    Non HDL Cholesterol  Desirable:  130 mg/dL  Above Desirable:  130-159 mg/dL  Borderline High:  160-189 mg/dL  High:  190-219 mg/dL  Very High:  Greater than or equal to 220 mg/dL   Comprehensive Metabolic Panel   Result Value Ref Range    Sodium 137 134 - 144 mmol/L    Potassium 4.2 3.5 - 5.1 mmol/L    Chloride 105 98 - 107 mmol/L    Carbon Dioxide (CO2) 26 21 - 31 mmol/L    Anion Gap 6 3 - 14 mmol/L    Urea Nitrogen 20 7 - 25 mg/dL    Creatinine 1.49 (H) 0.70 - 1.30 mg/dL    Calcium 9.4 8.6 - 10.3 mg/dL    Glucose 79 70 - 105 mg/dL    Alkaline Phosphatase 98 34 - 104 U/L    AST 20 13 - 39 U/L    ALT 14 7 - 52 U/L    Protein Total 7.5 6.4 - 8.9 g/dL    Albumin 3.8 3.5 - 5.7 g/dL    Bilirubin Total 0.3 0.3 - 1.0 mg/dL    GFR Estimate 50 (L) >60 mL/min/1.73m2      Comment:      As of July 11, 2021, eGFR is calculated by the CKD-EPI creatinine equation, without race adjustment. eGFR can be influenced by muscle mass, exercise, and diet. The reported eGFR is an estimation only and is only applicable if the renal function is stable.   CBC with platelets and differential   Result Value Ref Range    WBC Count 8.4 4.0 - 11.0 10e3/uL    RBC Count 4.53 4.40 - 5.90 10e6/uL    Hemoglobin 9.7 (L) 13.3 - 17.7 g/dL    Hematocrit 32.0 (L) 40.0 - 53.0 %    MCV 71 (L) 78 - 100 fL    MCH 21.4 (L) 26.5 - 33.0 pg    MCHC 30.3 (L) 31.5 - 36.5 g/dL    RDW 17.6 (H) 10.0 - 15.0 %    Platelet Count 325 150 - 450 10e3/uL    % Neutrophils 68 %    % Lymphocytes 20 %    % Monocytes 10 %    % Eosinophils 1 %    % Basophils 1 %    % Immature Granulocytes 0 %    NRBCs per 100 WBC 0 <1 /100    Absolute Neutrophils 5.7 1.6 - 8.3 10e3/uL     Absolute Lymphocytes 1.7 0.8 - 5.3 10e3/uL    Absolute Monocytes 0.8 0.0 - 1.3 10e3/uL    Absolute Eosinophils 0.1 0.0 - 0.7 10e3/uL    Absolute Basophils 0.0 0.0 - 0.2 10e3/uL    Absolute Immature Granulocytes 0.0 <=0.0 10e3/uL    Absolute NRBCs 0.0 10e3/uL       If you have any questions or concerns, please call the clinic at the number listed above.       Sincerely,      Dorota Posada PA-C

## 2021-09-14 NOTE — PATIENT INSTRUCTIONS
"Sore throat:   Completed strep and COVID-19 test.   May use symptomatic care with tylenol or ibuprofen. Sudafed or mucinex work well for congestion. May use cough syrup or cough drops.  Using a humidifier works well to break up the congestion. You can also sleep propped up on a couple pillows to decrease symptoms at night.    Please take tylenol as needed up to 4 times daily.  Treat symptomatically with warm salt water gargles.  Lozenges, Tylenol, Advil or Aleve as needed. Frequent swallows of cool liquid.  Oatmeal coats the throat and some patients find it soothes the pain. Encouraged warm teas or fluids with honey.     If you have sinus congestion -  Use a Neti Pot/sinus flush (Jakob Med Sinus Rinse) 3 times daily to irrigate sinuses/mucosal tissue.     Monitor for any fevers or chills. Return in 7-10 days if not feeling better. Please call clinic with any questions or concerns. Return to clinic with change/worsening of symptoms.   Encouraged fluids and rest.    Call 9-1-1 or go to the emergency room if you:  Have trouble breathing   Are drooling because you cannot swallow your saliva   Have swelling of the neck or tongue   Cannot move your neck or have trouble opening your mouth    Healthy Strategies  1. Eat at least 3 meals a day and never skip breakfast.  2. Eat more slowly.  3. Decrease portion size.  4. Provide structure by using meal replacement bars or shakes, and/or low calorie frozen meals.  5. For good nutrition incorporate fruit, vegetables, whole grains, lean protein, and low-fat dairy.  6. Remove trigger foods fromyour environment to avoid impulse eating.  7. Increase physical activity: get a pedometer and aim for 10,000 steps a day or 30-35 minutes of activity 5 days per week.  8. Weigh yourself daily or at least weekly.  9. Keep a record of what you eat and your activity.  10. Establish a support system suchas a friend, group or program.    11. Read Abdi Gary's \"Eat to Live\". Remember it is " important to have a minimum of 1200calories a day, okay to use olive oil, 40 grams of fiber daily. No more than two servings (the size of your palm) of red meat a week.     Please consider the following general health recommendations:    Eat a quality diet (generally, low in simple sugars, starches, cholesterol and saturated fat.)    Please get 1200 mg of calcium in divided doses with 800 units vitamin D in your diet daily. Take supplements as needed to obtain full recommended amounts.    Stay physically active. Regular walking or other exercise is one of the best ways to minimize pain of arthritis; maintain independence and mobility; maintain bone strength; maintain conditioning of your heart. Find something you enjoy and a friend to do it with you.    Maintain ideal weight. Your Body mass index is Body mass index is 28.87 kg/m .. Generally a BMI of 20-25 is considered ideal. Overweight is defined as 25-30, obese is 30-35 and markedly obese is greater than 35.    Apply sun block (SPF 25 or greater) on exposed skin anytime you are out in the sun to prevent skin cancer.     Wear a seatbelt whenever you are in a car.    Obtain a flu shot every fall.    You should have a tetanus booster at least once every 10 years.    Colonoscopy (an exam of the colon) is recommended every 10 years after the age of 50 to screen for colon cancer. (More often if you are at increased risk.)    A vaccine to reduce your chances of getting shingles is available if you are over 50. Information about the vaccine is available through the clinic or at:  (https://www.cdc.gov/vaccines/vpd/shingles/public/shingrix/index.html)    Check blood sugar annually. Cholesterol annually unless you have had a normal level when last checked within 5 years.     I recommend thatyou have a general physical exam every year.       Blood pressure is elevated today. Encouraged to monitor blood pressure a couple times a week over the next few weeks. Return in 3-4  weeks if blood pressure is persistently elevated for a recheck appointment. Work on diet and exercise to decrease blood pressure. Weight loss is helpful.  Decrease salt intake.      -- Learn about DASH Diet ([email or web address] - redirects to the NIH) for dietary ways to reduce blood pressure

## 2021-09-14 NOTE — NURSING NOTE
"Chief Complaint   Patient presents with     Physical     Also ER follow-up   Patient states she has a lump on his left mid rib cage. He also states his urine is red at times and a doctor told him he has a \"dead kidney.\" Patient states he has an itchy throat.    Initial BP (!) 156/84   Pulse 96   Temp 98.7  F (37.1  C) (Tympanic)   Resp 20   Ht 1.778 m (5' 10\")   Wt 91.3 kg (201 lb 3.2 oz)   SpO2 98%   BMI 28.87 kg/m   Estimated body mass index is 28.87 kg/m  as calculated from the following:    Height as of this encounter: 1.778 m (5' 10\").    Weight as of this encounter: 91.3 kg (201 lb 3.2 oz).  Medication Reconciliation: complete    FOOD SECURITY SCREENING QUESTIONS  Hunger Vital Signs:  Within the past 12 months we worried whether our food would run out before we got money to buy more. Never  Within the past 12 months the food we bought just didn't last and we didn't have money to get more. Never      Advanced Care Directive Reviewed    Kishore Ororuke LPN  "

## 2021-09-15 DIAGNOSIS — Z12.11 SCREENING FOR COLON CANCER: Primary | ICD-10-CM

## 2021-09-15 ASSESSMENT — ANXIETY QUESTIONNAIRES: GAD7 TOTAL SCORE: 0

## 2021-09-15 ASSESSMENT — PATIENT HEALTH QUESTIONNAIRE - PHQ9: SUM OF ALL RESPONSES TO PHQ QUESTIONS 1-9: 0

## 2021-09-15 NOTE — TELEPHONE ENCOUNTER
Screening Questions for the Scheduling of Screening Colonoscopies   (If Colonoscopy is diagnostic, Provider should review the chart before scheduling.)  Are you younger than 50 or older than 80?   NO   Do you take aspirin or fish oil?  NO  (if yes, tell patient to stop 1 week prior to Colonoscopy)  Do you take warfarin (Coumadin), clopidogrel (Plavix), apixaban (Eliquis), dabigatram (Pradaxa), rivaroxaban (Xarelto) or any blood thinner? NO   Do you use oxygen at home?  NO   Do you have kidney disease? NO   Are you on dialysis? NO   Have you had a stroke or heart attack in the last year? NO   Have you had a stent in your heart or any blood vessel in the last year? NO   Have you had a transplant of any organ? NO   Have you had a colonoscopy or upper endoscopy (EGD) before? NO          When?      Date of scheduled Colonoscopy. 11/02/2021  Provider [facility]   Pharmacy [facility]

## 2021-09-16 PROBLEM — Z87.448 HISTORY OF HYDRONEPHROSIS: Status: RESOLVED | Noted: 2021-09-16 | Resolved: 2021-09-16

## 2021-09-16 PROBLEM — R79.89 ELEVATED SERUM CREATININE: Status: ACTIVE | Noted: 2021-09-16

## 2021-09-16 PROBLEM — D64.9 LOW HEMOGLOBIN: Status: ACTIVE | Noted: 2021-09-16

## 2021-09-16 PROBLEM — Z87.448 HISTORY OF HYDRONEPHROSIS: Status: ACTIVE | Noted: 2021-09-16

## 2021-09-16 LAB
BACTERIA UR CULT: NO GROWTH
SARS-COV-2 RNA RESP QL NAA+PROBE: NEGATIVE

## 2021-09-16 RX ORDER — BISACODYL 5 MG
TABLET, DELAYED RELEASE (ENTERIC COATED) ORAL
Qty: 2 TABLET | Refills: 0 | Status: SHIPPED | OUTPATIENT
Start: 2021-09-16 | End: 2021-09-28

## 2021-09-16 RX ORDER — POLYETHYLENE GLYCOL 3350, SODIUM CHLORIDE, SODIUM BICARBONATE, POTASSIUM CHLORIDE 420; 11.2; 5.72; 1.48 G/4L; G/4L; G/4L; G/4L
4000 POWDER, FOR SOLUTION ORAL ONCE
Qty: 4000 ML | Refills: 0 | Status: SHIPPED | OUTPATIENT
Start: 2021-09-16 | End: 2021-09-16

## 2021-09-17 PROBLEM — R03.0 ELEVATED BLOOD PRESSURE READING WITHOUT DIAGNOSIS OF HYPERTENSION: Status: ACTIVE | Noted: 2021-09-17

## 2021-09-27 ENCOUNTER — OFFICE VISIT (OUTPATIENT)
Dept: FAMILY MEDICINE | Facility: OTHER | Age: 61
End: 2021-09-27
Attending: PHYSICIAN ASSISTANT
Payer: COMMERCIAL

## 2021-09-27 VITALS
OXYGEN SATURATION: 97 % | HEART RATE: 82 BPM | SYSTOLIC BLOOD PRESSURE: 138 MMHG | DIASTOLIC BLOOD PRESSURE: 82 MMHG | WEIGHT: 202.4 LBS | TEMPERATURE: 98.7 F | RESPIRATION RATE: 18 BRPM | BODY MASS INDEX: 29.04 KG/M2

## 2021-09-27 DIAGNOSIS — Z53.9 ERRONEOUS ENCOUNTER--DISREGARD: Primary | ICD-10-CM

## 2021-09-27 ASSESSMENT — PAIN SCALES - GENERAL: PAINLEVEL: NO PAIN (0)

## 2021-09-27 NOTE — NURSING NOTE
"Chief Complaint   Patient presents with     Recheck Medication   \"She made the appointment, I do not know why.\" He thinks maybe it is blood pressure related. Patient states he takes calcium and vitamins- both OTC medication. Patient states he is doing good.    Initial /82   Pulse 82   Temp 98.7  F (37.1  C) (Tympanic)   Resp 18   Wt 91.8 kg (202 lb 6.4 oz)   SpO2 97%   BMI 29.04 kg/m   Estimated body mass index is 29.04 kg/m  as calculated from the following:    Height as of 9/14/21: 1.778 m (5' 10\").    Weight as of this encounter: 91.8 kg (202 lb 6.4 oz).  Medication Reconciliation: complete    FOOD SECURITY SCREENING QUESTIONS  Hunger Vital Signs:  Within the past 12 months we worried whether our food would run out before we got money to buy more. Never  Within the past 12 months the food we bought just didn't last and we didn't have money to get more. Never      Advanced Care Directive Reviewed    Kishore Orourke LPN  "

## 2021-09-27 NOTE — PROGRESS NOTES
Appointment made in error.  Patient has internal medicine appointment on 9/28 and urology on 9/29.   Dorota Posada PA-C ..................9/27/2021 1:55 PM           This encounter was opened in error. Please disregard.

## 2021-09-28 ENCOUNTER — OFFICE VISIT (OUTPATIENT)
Dept: INTERNAL MEDICINE | Facility: OTHER | Age: 61
End: 2021-09-28
Attending: STUDENT IN AN ORGANIZED HEALTH CARE EDUCATION/TRAINING PROGRAM
Payer: COMMERCIAL

## 2021-09-28 VITALS
DIASTOLIC BLOOD PRESSURE: 88 MMHG | BODY MASS INDEX: 29.18 KG/M2 | TEMPERATURE: 99 F | SYSTOLIC BLOOD PRESSURE: 138 MMHG | WEIGHT: 203.4 LBS | HEART RATE: 83 BPM | RESPIRATION RATE: 16 BRPM | OXYGEN SATURATION: 97 %

## 2021-09-28 DIAGNOSIS — R31.0 GROSS HEMATURIA: ICD-10-CM

## 2021-09-28 DIAGNOSIS — N18.31 STAGE 3A CHRONIC KIDNEY DISEASE (H): Primary | ICD-10-CM

## 2021-09-28 DIAGNOSIS — R79.89 ELEVATED SERUM CREATININE: ICD-10-CM

## 2021-09-28 DIAGNOSIS — D50.9 MICROCYTIC ANEMIA: ICD-10-CM

## 2021-09-28 DIAGNOSIS — Z72.0 TOBACCO ABUSE DISORDER: ICD-10-CM

## 2021-09-28 DIAGNOSIS — Z23 NEED FOR IMMUNIZATION AGAINST INFLUENZA: ICD-10-CM

## 2021-09-28 DIAGNOSIS — D50.0 IRON DEFICIENCY ANEMIA DUE TO CHRONIC BLOOD LOSS: ICD-10-CM

## 2021-09-28 DIAGNOSIS — Z87.448 HISTORY OF HYDRONEPHROSIS: ICD-10-CM

## 2021-09-28 PROBLEM — N18.30 CHRONIC KIDNEY DISEASE, STAGE 3 (H): Status: ACTIVE | Noted: 2021-09-28

## 2021-09-28 LAB
ANION GAP SERPL CALCULATED.3IONS-SCNC: 5 MMOL/L (ref 3–14)
BILIRUB SERPL-MCNC: 0.4 MG/DL (ref 0.3–1)
BUN SERPL-MCNC: 20 MG/DL (ref 7–25)
CALCIUM SERPL-MCNC: 9.7 MG/DL (ref 8.6–10.3)
CHLORIDE BLD-SCNC: 104 MMOL/L (ref 98–107)
CO2 SERPL-SCNC: 28 MMOL/L (ref 21–31)
CREAT SERPL-MCNC: 1.52 MG/DL (ref 0.7–1.3)
FERRITIN SERPL-MCNC: 12 NG/ML (ref 24–336)
GFR SERPL CREATININE-BSD FRML MDRD: 49 ML/MIN/1.73M2
GLUCOSE BLD-MCNC: 91 MG/DL (ref 70–105)
HGB BLD-MCNC: 9.4 G/DL (ref 13.3–17.7)
IRON SATN MFR SERPL: 5 % (ref 20–55)
IRON SERPL-MCNC: 25 UG/DL (ref 50–212)
POTASSIUM BLD-SCNC: 4.5 MMOL/L (ref 3.5–5.1)
RETICS # AUTO: 0.07 10E6/UL (ref 0.03–0.1)
RETICS/RBC NFR AUTO: 1.5 % (ref 0.5–2)
SODIUM SERPL-SCNC: 137 MMOL/L (ref 134–144)
TIBC SERPL-MCNC: 471.8 UG/DL (ref 245–400)
TRANSFERRIN SERPL-MCNC: 337 MG/DL (ref 203–362)
UIBC (UNSATURATED): 446.8 MG/DL
VIT B12 SERPL-MCNC: 737 PG/ML (ref 180–914)

## 2021-09-28 PROCEDURE — 99406 BEHAV CHNG SMOKING 3-10 MIN: CPT | Mod: 25 | Performed by: STUDENT IN AN ORGANIZED HEALTH CARE EDUCATION/TRAINING PROGRAM

## 2021-09-28 PROCEDURE — 82247 BILIRUBIN TOTAL: CPT | Mod: ZL | Performed by: STUDENT IN AN ORGANIZED HEALTH CARE EDUCATION/TRAINING PROGRAM

## 2021-09-28 PROCEDURE — 82374 ASSAY BLOOD CARBON DIOXIDE: CPT | Mod: ZL | Performed by: STUDENT IN AN ORGANIZED HEALTH CARE EDUCATION/TRAINING PROGRAM

## 2021-09-28 PROCEDURE — 83550 IRON BINDING TEST: CPT | Mod: ZL | Performed by: STUDENT IN AN ORGANIZED HEALTH CARE EDUCATION/TRAINING PROGRAM

## 2021-09-28 PROCEDURE — 99214 OFFICE O/P EST MOD 30 MIN: CPT | Mod: 25 | Performed by: STUDENT IN AN ORGANIZED HEALTH CARE EDUCATION/TRAINING PROGRAM

## 2021-09-28 PROCEDURE — 36415 COLL VENOUS BLD VENIPUNCTURE: CPT | Mod: ZL | Performed by: STUDENT IN AN ORGANIZED HEALTH CARE EDUCATION/TRAINING PROGRAM

## 2021-09-28 PROCEDURE — 85018 HEMOGLOBIN: CPT | Mod: ZL | Performed by: STUDENT IN AN ORGANIZED HEALTH CARE EDUCATION/TRAINING PROGRAM

## 2021-09-28 PROCEDURE — 82728 ASSAY OF FERRITIN: CPT | Mod: ZL | Performed by: STUDENT IN AN ORGANIZED HEALTH CARE EDUCATION/TRAINING PROGRAM

## 2021-09-28 PROCEDURE — 90682 RIV4 VACC RECOMBINANT DNA IM: CPT | Performed by: STUDENT IN AN ORGANIZED HEALTH CARE EDUCATION/TRAINING PROGRAM

## 2021-09-28 PROCEDURE — 82607 VITAMIN B-12: CPT | Mod: ZL | Performed by: STUDENT IN AN ORGANIZED HEALTH CARE EDUCATION/TRAINING PROGRAM

## 2021-09-28 PROCEDURE — 85045 AUTOMATED RETICULOCYTE COUNT: CPT | Mod: ZL | Performed by: STUDENT IN AN ORGANIZED HEALTH CARE EDUCATION/TRAINING PROGRAM

## 2021-09-28 PROCEDURE — 90471 IMMUNIZATION ADMIN: CPT | Performed by: STUDENT IN AN ORGANIZED HEALTH CARE EDUCATION/TRAINING PROGRAM

## 2021-09-28 RX ORDER — NICOTINE 21 MG/24HR
1 PATCH, TRANSDERMAL 24 HOURS TRANSDERMAL EVERY 24 HOURS
Qty: 28 PATCH | Refills: 4 | Status: SHIPPED | OUTPATIENT
Start: 2021-09-28 | End: 2022-11-08

## 2021-09-28 ASSESSMENT — ENCOUNTER SYMPTOMS
AGITATION: 0
BRUISES/BLEEDS EASILY: 0
ARTHRALGIAS: 0
MYALGIAS: 0
CHILLS: 0
VOMITING: 0
SHORTNESS OF BREATH: 0
PALPITATIONS: 0
FEVER: 0
LIGHT-HEADEDNESS: 0
DYSURIA: 0
CONFUSION: 0
HEMATURIA: 1
WOUND: 0
FLANK PAIN: 0
ABDOMINAL PAIN: 0
NAUSEA: 0
COUGH: 0
DIZZINESS: 0
WHEEZING: 0
DIARRHEA: 0

## 2021-09-28 ASSESSMENT — PAIN SCALES - GENERAL: PAINLEVEL: NO PAIN (0)

## 2021-09-28 NOTE — PROGRESS NOTES
Mr. Hansen is a 61 year old male who presents to the clinic for follow up of kidney function    HPI     Has a bad kidney had hydro in march 2021. Procedure with urology in April 2021. Kidney stone at that time based on sample that was taken but no stone was apparently seen on cystoscopy.    Operative note states that an area of obstruction was present in the right kidney which was unable to be passed.  Essentially his right kidney has hydronephrosis.  He has no pain from this.    He followed with Dorota Posada for blood in his urine.  He also has CKD with a creatinine of 1.49 and anemia of 9.7.  UA demonstrated large amount of blood in his urine.  He has an appointment tomorrow with urology for further consultation.  He states he is more fatigued at times.    Still smoking.  Interested in quitting.  Prescribed both nicotine patches and lozenges.      Review of Systems   Constitutional: Negative for chills and fever.   HENT: Negative for congestion and hearing loss.    Eyes: Negative for visual disturbance.   Respiratory: Negative for cough, shortness of breath and wheezing.    Cardiovascular: Negative for chest pain and palpitations.   Gastrointestinal: Negative for abdominal pain, diarrhea, nausea and vomiting.   Endocrine: Negative for cold intolerance and heat intolerance.   Genitourinary: Positive for hematuria. Negative for dysuria and flank pain.   Musculoskeletal: Negative for arthralgias and myalgias.   Skin: Negative for rash and wound.   Allergic/Immunologic: Negative for immunocompromised state.   Neurological: Negative for dizziness and light-headedness.   Hematological: Does not bruise/bleed easily.   Psychiatric/Behavioral: Negative for agitation and confusion.          Reviewed and updated as needed this visit by Provider     Meds  Problems             EXAM:   Vitals:    09/28/21 1555   BP: 138/88   BP Location: Right arm   Patient Position: Sitting   Cuff Size: Adult Regular   Pulse: 83   Resp: 16  "  Temp: 99  F (37.2  C)   TempSrc: Tympanic   SpO2: 97%   Weight: 92.3 kg (203 lb 6.4 oz)         BP Readings from Last 3 Encounters:   09/28/21 138/88   09/27/21 138/82   09/14/21 (!) 140/78      Wt Readings from Last 3 Encounters:   09/28/21 92.3 kg (203 lb 6.4 oz)   09/27/21 91.8 kg (202 lb 6.4 oz)   09/14/21 91.3 kg (201 lb 3.2 oz)      Estimated body mass index is 29.18 kg/m  as calculated from the following:    Height as of 9/14/21: 1.778 m (5' 10\").    Weight as of this encounter: 92.3 kg (203 lb 6.4 oz).     Physical Exam  Vitals and nursing note reviewed.   Constitutional:       Appearance: Normal appearance.   HENT:      Head: Normocephalic and atraumatic.   Cardiovascular:      Heart sounds: No murmur heard.     Pulmonary:      Effort: No respiratory distress.   Skin:     General: Skin is warm and dry.      Coloration: Skin is not jaundiced or pale.      Findings: No rash.   Neurological:      General: No focal deficit present.      Mental Status: He is alert and oriented to person, place, and time. Mental status is at baseline.   Psychiatric:         Mood and Affect: Mood normal.         Behavior: Behavior normal.          INVESTIGATIONS:  - Labs reviewed in Epic: Microcytic anemia    ASSESSMENT AND PLAN:    ICD-10-CM    1. Stage 3a chronic kidney disease  N18.31 Basic Metabolic Panel     Basic Metabolic Panel   2. Need for immunization against influenza  Z23 INFLUENZA QUAD, RECOMBINANT, P-FREE (RIV4) (FLUBLOK)   3. Elevated serum creatinine  R79.89 Internal Medicine Referral   4. Low hemoglobin  D64.9 Internal Medicine Referral     Hemoglobin     Iron Binding Panel     Vitamin B12     Ferritin     Reticulocytes     Bilirubin, Total     Bilirubin, Total     Reticulocytes     Ferritin     Vitamin B12     Iron Binding Panel     Hemoglobin   5. History of hydronephrosis  Z87.448 Internal Medicine Referral   6. Tobacco abuse disorder  Z72.0 nicotine (NICORETTE) 4 MG lozenge     nicotine (NICODERM CQ) 21 " MG/24HR 24 hr patch       Assessment/Plan:     Chronic kidney disease, hydronephrosis of the right kidney, hematuria: Patient with hydronephrosis of the right kidney in March 2021 has had cystoscopy with urology it is not clear what the cause of stricture is.  Unfortunately patient has CKD stage III which is likely secondary to 1 functioning kidney.  Creatinine is 1.49.  Discussed that we will need to have optimal blood pressure control and his blood pressure is slightly elevated today would recommend an ACE inhibitor/ARB pending re-evaluation by urology due to the hematuria patient is having.  Patient has a follow-up with urology tomorrow, unable to perform CT scan today so will defer ordering this to urology.      Anemia: Patient has gross hematuria and this alone can cause his anemia.  His anemia is microcytic and suspect iron deficient likely secondary to blood loss.  If his anemia was secondary to kidney disease alone then with suspected normocytic anemia.  Will obtain basic anemia  for evaluation of the cause.  Doubt the patient has thalassemia as he is of northern  ancestry with normal MCV in the past.  -Hemoglobin, ferritin, iron panel, vitamin B12, bilirubin, reticulocyte    Patiently currently smoking approximate 1.5 packs of cigarettes per day.  We will try both nicotine lozenges and patches to see what is more effective for him. I spent 3-10 minutes on smoking cessation with the patient.       Follow-up with me or PCP in 2-4 weeks after urology appointment.      Electronically signed by:  Merrick Lopes MD on 9/28/2021  Internal Medicine  [facility]

## 2021-09-28 NOTE — LETTER
September 30, 2021      Julius Hansen  [address]   [place] [address]        Dear ,    We are writing to inform you of your test results.    Your lab results are remarkable for low iron and low hemoglobin.  It looks like you have a colonoscopy scheduled for November and I recommend keeping this.  The blood that you are losing your urine is also related to this.  It appears that Dr. Montalvo is not going to address this with a cystoscopy but will rather watch it.  I would like you to take an iron supplement and follow-up with me or Dorota Posada in about 1 to 2 months or if the bleeding gets worse.  I have sent the iron supplement to [employer].  This can make you constipated so keep an eye on this and take as needed MiraLAX.    Resulted Orders   Basic Metabolic Panel   Result Value Ref Range    Sodium 137 134 - 144 mmol/L    Potassium 4.5 3.5 - 5.1 mmol/L    Chloride 104 98 - 107 mmol/L    Carbon Dioxide (CO2) 28 21 - 31 mmol/L    Anion Gap 5 3 - 14 mmol/L    Urea Nitrogen 20 7 - 25 mg/dL    Creatinine 1.52 (H) 0.70 - 1.30 mg/dL    Calcium 9.7 8.6 - 10.3 mg/dL    Glucose 91 70 - 105 mg/dL    GFR Estimate 49 (L) >60 mL/min/1.73m2      Comment:      As of July 11, 2021, eGFR is calculated by the CKD-EPI creatinine equation, without race adjustment. eGFR can be influenced by muscle mass, exercise, and diet. The reported eGFR is an estimation only and is only applicable if the renal function is stable.   Bilirubin, Total   Result Value Ref Range    Bilirubin Total 0.4 0.3 - 1.0 mg/dL   Reticulocytes   Result Value Ref Range    % Reticulocyte 1.5 0.5 - 2.0 %    Absolute Reticulocyte 0.067 0.025 - 0.095 10e6/uL   Ferritin   Result Value Ref Range    Ferritin 12 (L) 24 - 336 ng/mL   Vitamin B12   Result Value Ref Range    Vitamin B12 737 180 - 914 pg/mL   Iron Binding Panel   Result Value Ref Range    Transferrin 337 203 - 362 mg/dL    Iron 25 (L) 50 - 212 ug/dL    UIBC (Unsaturated) 446.80 mg/dL    Iron Binding  Capacity 471.80 (H) 245.00 - 400.00 ug/dL    Iron Saturation 5 (L) 20 - 55 %   Hemoglobin   Result Value Ref Range    Hemoglobin 9.4 (L) 13.3 - 17.7 g/dL       If you have any questions or concerns, please call the clinic at the number listed above.       Sincerely,      Merrick Lopes MD

## 2021-09-28 NOTE — PROGRESS NOTES
Medication Reconciliation: complete   Advance Care Directive Reviewed    FOOD SECURITY SCREENING QUESTIONS  Hunger Vital Signs:  Within the past 12 months we worried whether our food would run out before we got money to buy more. Never  Within the past 12 months the food we bought just didn't last and we didn't have money to get more. Never  Luda Gayle LPN .............9/28/2021  3:58 PM  Immunization Documentation    Prior to Immunization administration, verified patients identity using patient's name and date of birth. Please see IMMUNIZATIONS  and order for additional information.  Patient / Parent instructed to remain in clinic for 15 minutes and report any adverse reaction to staff immediately.    Was entire vial of medication used? Yes  Vial/Syringe: Syringe    Luda Gayle LPN  9/28/2021   3:59 PM

## 2021-09-29 ENCOUNTER — OFFICE VISIT (OUTPATIENT)
Dept: UROLOGY | Facility: OTHER | Age: 61
End: 2021-09-29
Attending: PHYSICIAN ASSISTANT
Payer: COMMERCIAL

## 2021-09-29 VITALS
OXYGEN SATURATION: 96 % | WEIGHT: 200.6 LBS | DIASTOLIC BLOOD PRESSURE: 84 MMHG | BODY MASS INDEX: 28.78 KG/M2 | HEART RATE: 84 BPM | SYSTOLIC BLOOD PRESSURE: 130 MMHG | RESPIRATION RATE: 16 BRPM

## 2021-09-29 DIAGNOSIS — R31.0 GROSS HEMATURIA: ICD-10-CM

## 2021-09-29 PROCEDURE — 99214 OFFICE O/P EST MOD 30 MIN: CPT | Performed by: UROLOGY

## 2021-09-29 ASSESSMENT — PAIN SCALES - GENERAL: PAINLEVEL: NO PAIN (0)

## 2021-09-29 NOTE — NURSING NOTE
Chief Complaint   Patient presents with     Follow Up     gross hematuria   Patient presents to the clinic today for a follow up for gross hematuria.    Review of Systems:    Weight loss:    No     Recent fever/chills:  No   Night sweats:   No  Current skin rash:  No   Recent hair loss:  No  Heat intolerance:  No   Cold intolerance:  No  Chest pain:   No   Palpitations:   No  Shortness of breath:  No   Wheezing:   No  Constipation:    No   Diarrhea:   No   Nausea:   No   Vomiting:   No   Kidney/side pain:  No   Back pain:   No  Frequent headaches:  No   Dizziness:     No  Leg swelling:   No   Calf pain:    No        Medication Reconciliation: completed   Marbella Bustamante LPN  9/29/2021 9:27 AM

## 2021-09-29 NOTE — PROGRESS NOTES
Type of Visit  EST    Chief Complaint  Gross hematuria  Right hydronephrosis    HPI  Mr. Hansen is a 61 year old male who follows up with gross hematuria and right hydronephrosis.  Approximate 5 months ago the patient underwent diagnostic ureteroscopy and retrograde pyelogram.  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    The recent episode of gross hematuria started 2 weeks ago.  The patient was seen and urinalysis revealed no evidence of infection.  He denies dysuria and fevers today.  Gross hematuria has improved.  Overall he feels well.    Hematuria-related signs/symptoms  History of smoking?    Yes  History of chemotherapy?   No  History of pelvic radiation?   No  History of kidney or bladder stones?  No  History of frequent urinary tract infections? No      Review of Systems  I personally reviewed the ROS with the patient.    Nursing Notes:   Marbella Bustamante LPN  9/29/2021  9:27 AM  Signed  Chief Complaint   Patient presents with     Follow Up     gross hematuria   Patient presents to the clinic today for a follow up for gross hematuria.    Review of Systems:    Weight loss:    No     Recent fever/chills:  No   Night sweats:   No  Current skin rash:  No   Recent hair loss:  No  Heat intolerance:  No   Cold intolerance:  No  Chest pain:   No   Palpitations:   No  Shortness of breath:  No   Wheezing:   No  Constipation:    No   Diarrhea:   No   Nausea:   No   Vomiting:   No   Kidney/side pain:  No   Back pain:   No  Frequent headaches:  No   Dizziness:     No  Leg swelling:   No   Calf pain:    No        Medication Reconciliation: completed   Marbella Bustamante LPN  9/29/2021 9:27 AM       Physical Exam  Vitals:    09/29/21 0930   BP: 130/84   BP Location: Right arm   Patient Position: Sitting   Cuff Size: Adult Large   Pulse: 84   Resp: 16   SpO2: 96%   Weight: 91 kg (200 lb 9.6 oz)     Constitutional: No acute distress.  Alert and  cooperative   Head: NCAT  Eyes: Conjunctivae normal  Cardiovascular: Regular rate.  Pulmonary/Chest: Respirations are even and non-labored bilaterally, no audible wheezing  Abdominal: Soft. No distension, tenderness, masses or guarding.   Neurological: A + O x 3.  Cranial Nerves II-XII grossly intact.  Extremities: VINCENT x 4, Warm. No clubbing.  No cyanosis.    Skin: Pink, warm and dry.  No visible rashes noted.  Psychiatric:  Normal mood and affect  Back:  No left CVA tenderness.  No right CVA tenderness.  Genitourinary:  Nonpalpable bladder    Labs  Results for HODA LALA (MRN [id number]) as of 9/29/2021 09:37   9/14/2021 16:22   Color Urine Yellow   Appearance Urine Clear   Glucose Urine Negative   Bilirubin Urine Negative   Ketones Urine Negative   Specific Gravity Urine 1.029   pH Urine 5.5   Protein Albumin Urine 30 (A)   Urobilinogen mg/dL Normal   Nitrite Urine Negative   Blood Urine Large (A)   Leukocyte Esterase Urine Negative   WBC Urine 0   RBC Urine >182 (H)   Yeast Urine Few (A)   Mucus Urine Present (A)     Imaging  CT a/p   3/18/2021  I personally reviewed and interpreted the images and report.  IMPRESSION:   1. Severe hydronephrosis, longstanding, with cortical thinning right kidney. A   few calcifications within the distended renal pelvis. Level of obstruction in   the region of the proximal right ureter. Severe thickening of the ureteric   mucosa with apparent mass. Recommend further evaluation/retrograde. Rule out   ureteric mass.visualized portions of the lung bases normal.   2. No bowel obstruction.   3. No free fluid within the pelvis or within the dependent portions of the   peritoneum.   4. Appendix normal.   5. Possible gallstones.     Assessment & Plan  Mr. Lala is a 61 year old active smoking male with gross hematuria and right hydronephrosis.  I reviewed the past notes, labs and recent imaging.    His right kidney is essentially nonfunctional.  The kidney has likely been  obstructed for years.  There is clearly a very thin rim of renal parenchyma remaining.  Explained that this is the likely source of his intermittent gross hematuria.    We discussed repeating the work-up as an option however he was thoroughly worked up in the past 5 months.  Recommended observation given he has recently undergone a full work-up

## 2021-09-30 RX ORDER — FERROUS GLUCONATE 324(38)MG
324 TABLET ORAL
Qty: 90 TABLET | Refills: 3 | Status: SHIPPED | OUTPATIENT
Start: 2021-09-30 | End: 2022-11-08

## 2021-10-30 ENCOUNTER — ALLIED HEALTH/NURSE VISIT (OUTPATIENT)
Dept: FAMILY MEDICINE | Facility: OTHER | Age: 61
End: 2021-10-30
Attending: SURGERY
Payer: COMMERCIAL

## 2021-10-30 DIAGNOSIS — Z20.822 COVID-19 RULED OUT: Primary | ICD-10-CM

## 2021-10-30 PROCEDURE — U0005 INFEC AGEN DETEC AMPLI PROBE: HCPCS | Mod: ZL

## 2021-10-30 PROCEDURE — C9803 HOPD COVID-19 SPEC COLLECT: HCPCS

## 2021-10-31 LAB — SARS-COV-2 RNA RESP QL NAA+PROBE: NEGATIVE

## 2021-11-02 ENCOUNTER — HOSPITAL ENCOUNTER (OUTPATIENT)
Facility: OTHER | Age: 61
Discharge: HOME | End: 2021-11-02
Attending: SURGERY | Admitting: SURGERY
Payer: COMMERCIAL

## 2021-11-02 ENCOUNTER — ANESTHESIA (OUTPATIENT)
Dept: SURGERY | Facility: OTHER | Age: 61
End: 2021-11-02
Payer: COMMERCIAL

## 2021-11-02 ENCOUNTER — ANESTHESIA EVENT (OUTPATIENT)
Dept: SURGERY | Facility: OTHER | Age: 61
End: 2021-11-02
Payer: COMMERCIAL

## 2021-11-02 VITALS
RESPIRATION RATE: 10 BRPM | SYSTOLIC BLOOD PRESSURE: 138 MMHG | DIASTOLIC BLOOD PRESSURE: 94 MMHG | OXYGEN SATURATION: 99 % | TEMPERATURE: 97 F | HEIGHT: 70 IN | BODY MASS INDEX: 28.63 KG/M2 | WEIGHT: 200 LBS | HEART RATE: 64 BPM

## 2021-11-02 PROCEDURE — 45380 COLONOSCOPY AND BIOPSY: CPT | Performed by: SURGERY

## 2021-11-02 PROCEDURE — 258N000003 HC RX IP 258 OP 636: Performed by: SURGERY

## 2021-11-02 PROCEDURE — 250N000011 HC RX IP 250 OP 636: Performed by: NURSE ANESTHETIST, CERTIFIED REGISTERED

## 2021-11-02 PROCEDURE — 45380 COLONOSCOPY AND BIOPSY: CPT | Mod: PT | Performed by: SURGERY

## 2021-11-02 PROCEDURE — 88305 TISSUE EXAM BY PATHOLOGIST: CPT

## 2021-11-02 PROCEDURE — 45380 COLONOSCOPY AND BIOPSY: CPT | Performed by: NURSE ANESTHETIST, CERTIFIED REGISTERED

## 2021-11-02 PROCEDURE — 250N000009 HC RX 250: Performed by: NURSE ANESTHETIST, CERTIFIED REGISTERED

## 2021-11-02 RX ORDER — LIDOCAINE HYDROCHLORIDE 20 MG/ML
INJECTION, SOLUTION INFILTRATION; PERINEURAL PRN
Status: DISCONTINUED | OUTPATIENT
Start: 2021-11-02 | End: 2021-11-02

## 2021-11-02 RX ORDER — SODIUM CHLORIDE, SODIUM LACTATE, POTASSIUM CHLORIDE, CALCIUM CHLORIDE 600; 310; 30; 20 MG/100ML; MG/100ML; MG/100ML; MG/100ML
INJECTION, SOLUTION INTRAVENOUS CONTINUOUS
Status: DISCONTINUED | OUTPATIENT
Start: 2021-11-02 | End: 2021-11-02 | Stop reason: HOSPADM

## 2021-11-02 RX ORDER — PROPOFOL 10 MG/ML
INJECTION, EMULSION INTRAVENOUS PRN
Status: DISCONTINUED | OUTPATIENT
Start: 2021-11-02 | End: 2021-11-02

## 2021-11-02 RX ORDER — LIDOCAINE 40 MG/G
CREAM TOPICAL
Status: DISCONTINUED | OUTPATIENT
Start: 2021-11-02 | End: 2021-11-02 | Stop reason: HOSPADM

## 2021-11-02 RX ORDER — PROPOFOL 10 MG/ML
INJECTION, EMULSION INTRAVENOUS CONTINUOUS PRN
Status: DISCONTINUED | OUTPATIENT
Start: 2021-11-02 | End: 2021-11-02

## 2021-11-02 RX ADMIN — PROPOFOL 140 MCG/KG/MIN: 10 INJECTION, EMULSION INTRAVENOUS at 12:03

## 2021-11-02 RX ADMIN — SODIUM CHLORIDE, POTASSIUM CHLORIDE, SODIUM LACTATE AND CALCIUM CHLORIDE 30 ML/HR: 600; 310; 30; 20 INJECTION, SOLUTION INTRAVENOUS at 11:14

## 2021-11-02 RX ADMIN — PROPOFOL 30 MG: 10 INJECTION, EMULSION INTRAVENOUS at 12:07

## 2021-11-02 RX ADMIN — LIDOCAINE HYDROCHLORIDE 60 MG: 20 INJECTION, SOLUTION INFILTRATION; PERINEURAL at 12:02

## 2021-11-02 RX ADMIN — PROPOFOL 90 MG: 10 INJECTION, EMULSION INTRAVENOUS at 12:02

## 2021-11-02 ASSESSMENT — MIFFLIN-ST. JEOR: SCORE: 1718.44

## 2021-11-02 NOTE — ANESTHESIA PREPROCEDURE EVALUATION
"Anesthesia Pre-Procedure Evaluation    Patient: Julius Hansen   MRN: [id number] : 1960        Preoperative Diagnosis: Encounter for screening colonoscopy [Z12.11]    Procedure : Procedure(s):  COLONOSCOPY          Past Medical History:   Diagnosis Date     Personal history of other medical treatment (CODE)     No Comments Provided      Past Surgical History:   Procedure Laterality Date     CYSTOSCOPY, URETEROSCOPY, COMBINED Right 2021    Procedure: Cystoscopy and right retrograde pyelogram and diagnostic ureteroscopy;  Surgeon: Trevon Montalvo MD;  Location: [facility] OR      No Known Allergies   Social History     Tobacco Use     Smoking status: Current Every Day Smoker     Packs/day: 1.00     Years: 20.00     Pack years: 20.00     Types: Cigarettes     Smokeless tobacco: Never Used   Substance Use Topics     Alcohol use: Yes     Comment: \"every night I have a couple after work\"      Wt Readings from Last 1 Encounters:   21 91 kg (200 lb 9.6 oz)        Anesthesia Evaluation   Pt has had prior anesthetic.         ROS/MED HX  ENT/Pulmonary:       Neurologic: Comment: Facial paralysis/[place] Palsy      Cardiovascular: Comment: Lyme    (+) hypertension-----    METS/Exercise Tolerance: >4 METS    Hematologic:     (+) anemia,     Musculoskeletal:  - neg musculoskeletal ROS     GI/Hepatic:     (+) bowel prep,     Renal/Genitourinary: Comment: Gross Hematuria     (+) renal disease, type: CRI, Pt does not require dialysis,     Endo:  - neg endo ROS     Psychiatric/Substance Use:     (+) Recreational drug usage: Cannabis.    Infectious Disease:  - neg infectious disease ROS     Malignancy:  - neg malignancy ROS     Other:  - neg other ROS          Physical Exam    Airway        Mallampati: II   TM distance: > 3 FB   Neck ROM: full   Mouth opening: > 3 cm    Respiratory Devices and Support         Dental       (+) upper dentures and lower dentures      Cardiovascular   cardiovascular exam normal       Rhythm and " rate: regular and normal     Pulmonary   pulmonary exam normal        breath sounds clear to auscultation           OUTSIDE LABS:  CBC:   Lab Results   Component Value Date    WBC 8.4 09/14/2021    WBC 8.7 03/18/2021    HGB 9.4 (L) 09/28/2021    HGB 9.7 (L) 09/14/2021    HCT 32.0 (L) 09/14/2021    HCT 34.8 (L) 03/18/2021     09/14/2021     03/18/2021     BMP:   Lab Results   Component Value Date     09/28/2021     09/14/2021    POTASSIUM 4.5 09/28/2021    POTASSIUM 4.2 09/14/2021    CHLORIDE 104 09/28/2021    CHLORIDE 105 09/14/2021    CO2 28 09/28/2021    CO2 26 09/14/2021    BUN 20 09/28/2021    BUN 20 09/14/2021    CR 1.52 (H) 09/28/2021    CR 1.49 (H) 09/14/2021    GLC 91 09/28/2021    GLC 79 09/14/2021     COAGS:   Lab Results   Component Value Date    PTT 29 08/14/2017    INR 1.2 08/14/2017     POC: No results found for: BGM, HCG, HCGS  HEPATIC:   Lab Results   Component Value Date    ALBUMIN 3.8 09/14/2021    PROTTOTAL 7.5 09/14/2021    ALT 14 09/14/2021    AST 20 09/14/2021    ALKPHOS 98 09/14/2021    BILITOTAL 0.4 09/28/2021     OTHER:   Lab Results   Component Value Date    LACT 0.7 03/18/2021    SANDY 9.7 09/28/2021    MAG 2.0 08/13/2017    CRP 68.0 (H) 01/18/2014    SED 21 (H) 01/18/2014       Anesthesia Plan    ASA Status:  2   NPO Status:  NPO Appropriate    Anesthesia Type: MAC.     - Reason for MAC: straight local not clinically adequate              Consents    Anesthesia Plan(s) and associated risks, benefits, and realistic alternatives discussed. Questions answered and patient/representative(s) expressed understanding.     - Discussed with:  Patient      - Extended Intubation/Ventilatory Support Discussed: No.      - Patient is DNR/DNI Status: No    Use of blood products discussed: No .     Postoperative Care            Comments:                GABRIEL Vo CRNA

## 2021-11-02 NOTE — ANESTHESIA POSTPROCEDURE EVALUATION
Patient: Julius Hansen    Procedure: Procedure(s):  COLONOSCOPY, WITH POLYPECTOMY AND BIOPSY       Diagnosis:Encounter for screening colonoscopy [Z12.11]  Diagnosis Additional Information: No value filed.    Anesthesia Type:  MAC    Note:  Disposition: Outpatient   Postop Pain Control: Uneventful            Sign Out: Well controlled pain   PONV: No   Neuro/Psych: Uneventful            Sign Out: Acceptable/Baseline neuro status   Airway/Respiratory: Uneventful            Sign Out: Acceptable/Baseline resp. status   CV/Hemodynamics: Uneventful            Sign Out: Acceptable CV status; No obvious hypovolemia; No obvious fluid overload   Other NRE: NONE   DID A NON-ROUTINE EVENT OCCUR? No           Last vitals:  Vitals Value Taken Time   BP     Temp     Pulse     Resp     SpO2 99 % 11/02/21 1228   Vitals shown include unvalidated device data.    Electronically Signed By: GABRIEL ARSHAD CRNA  November 2, 2021  12:29 PM

## 2021-11-02 NOTE — ANESTHESIA CARE TRANSFER NOTE
Patient: Julius Hansen    Procedure: Procedure(s):  COLONOSCOPY, WITH POLYPECTOMY AND BIOPSY       Diagnosis: Encounter for screening colonoscopy [Z12.11]  Diagnosis Additional Information: No value filed.    Anesthesia Type:   MAC     Note:    Oropharynx: oropharynx clear of all foreign objects  Level of Consciousness: drowsy  Oxygen Supplementation: nasal cannula  Level of Supplemental Oxygen (L/min / FiO2): 3  Independent Airway: airway patency satisfactory and stable  Dentition: dentition unchanged  Vital Signs Stable: post-procedure vital signs reviewed and stable  Report to RN Given: handoff report given  Patient transferred to: Phase II    Handoff Report: Identifed the Patient, Identified the Reponsible Provider, Reviewed the pertinent medical history, Discussed the surgical course, Reviewed Intra-OP anesthesia mangement and issues during anesthesia, Set expectations for post-procedure period and Allowed opportunity for questions and acknowledgement of understanding      Vitals:  Vitals Value Taken Time   BP     Temp     Pulse     Resp     SpO2         Electronically Signed By: GABRIEL ARSHAD CRNA  November 2, 2021  12:28 PM

## 2021-11-02 NOTE — DISCHARGE INSTRUCTIONS
[facility]  Adult Discharge Orders & Instructions    ________________________________________________________________          For 12 hours after surgery  1. Get plenty of rest.  A responsible adult must stay with you for at least 12 hours after you leave the hospital.   2. You may feel lightheaded.  IF so, sit for a few minutes before standing.  Have someone help you get up.   3. You may have a slight fever. Call the doctor if your fever is over 101 F (38.3 C) (taken under the tongue) or lasts longer than 24 hours.  4. You may have a dry mouth, a sore throat, muscle aches or trouble sleeping.  These should go away after 24 hours.  5. Do not make important or legal decisions.  6.   Do not drive or use heavy equipment.  If you have weakness or tingling, don't drive or use heavy equipment until this feeling goes away.    To contact a doctor, call   [phone]_______________________

## 2021-11-02 NOTE — OR NURSING
Wily Hansen has been discharged to home at 1325 via ambulation accompanied by SO.  Pt would not wait to speak with surgeon, just stated he'd wait for the letter in 1 week    Written discharge instructions were provided to pt and SO.      Patient and adult caring for them verbalize understanding of discharge instructions including no driving until tomorrow and no longer taking narcotic pain medications - no operating mechanical equipment and no making any important decisions.They understand reason for discharge, and necessary follow-up appointments.

## 2021-11-02 NOTE — H&P
GENERAL SURGERY CONSULTATION NOTE    Julius Hansen   PO   [facility] [address]  61 year old  male    Primary Care Provider:  Dorota Posada      HPI: Julius Hansen presents to day surgery in need of colonoscopy for screening for colon cancer.   Julius Hansen denies family history of colon cancer. Patient denies change in bowel habits or blood in stools. Previous colonoscopy was never.     REVIEW OF SYSTEMS:    GENERAL: No fevers or chills. Denies fatigue, recent weight loss.  HEENT: No sinus drainage. No changes with vision or hearing. No difficulty swallowing.   LYMPHATICS:  Noswollen nodes in axilla, neck or groin.  CARDIOVASCULAR: Denies chest pain, palpitations and dyspnea on exertion.  PULMONARY: No shortness of breath or cough. No increase in sputum production.  GI: Denies melena,bright red blood in stools. No hematemesis. No constipation or diarrhea.  : No dysuria or hematuria.  SKIN: No recent rashes or ulcers.   HEMATOLOGY:  No history of easy bruising or bleeding.  ENDOCRINE:  No history of diabetes or thyroid problems.  NEUROLOGY:  No history of seizures or headaches. No motor or sensory changes.        Patient Active Problem List   Diagnosis     Facial paralysis/[place] palsy     Marijuana use     Lyme disease     Tobacco dependence     Gross hematuria     Hydronephrosis, right     Elevated serum creatinine     Low hemoglobin     Elevated blood pressure reading without diagnosis of hypertension     Chronic kidney disease, stage 3       Past Medical History:   Diagnosis Date     Personal history of other medical treatment (CODE)     No Comments Provided       Past Surgical History:   Procedure Laterality Date     CYSTOSCOPY, URETEROSCOPY, COMBINED Right 04/06/2021    Procedure: Cystoscopy and right retrograde pyelogram and diagnostic ureteroscopy;  Surgeon: Trevon Montalvo MD;  Location:  OR       Family History   Problem Relation Age of Onset     No Known Problems Mother       "Hypertension Father      Hyperlipidemia Father      Dementia Father      Lung Cancer Sister      No Known Problems Sister      No Known Problems Brother        Social History     Social History Narrative    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  Will be going to [facility] in 10/2013 to work for up to 2 years.       Social History     Socioeconomic History     Marital status: Single     Spouse name: Not on file     Number of children: Not on file     Years of education: Not on file     Highest education level: Not on file   Occupational History     Not on file   Tobacco Use     Smoking status: Current Every Day Smoker     Packs/day: 1.00     Years: 20.00     Pack years: 20.00     Types: Cigarettes     Smokeless tobacco: Never Used   Vaping Use     Vaping Use: Never used   Substance and Sexual Activity     Alcohol use: Yes     Comment: \"every night I have a couple after work\"     Drug use: Yes     Types: Marijuana     Sexual activity: Yes     Partners: Female     Birth control/protection: Surgical   Other Topics Concern     Not on file   Social History Narrative    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  Will be going to [facility] [facility] [employer] in 10/2013 to work for up to 2 years.     Social Determinants of Health     Financial Resource Strain:      Difficulty of Paying Living Expenses:    Food Insecurity:      Worried About Running Out of Food in the Last Year:      Ran Out of Food in the Last Year:    Transportation Needs:      Lack of Transportation (Medical):      Lack of Transportation (Non-Medical):    Physical Activity:      Days of Exercise per Week:      Minutes of Exercise per Session:    Stress:      Feeling of Stress :    Social Connections:      Frequency of Communication with Friends and Family:      Frequency of Social Gatherings with Friends and Family:      Attends Worship Services:      Active Member of Clubs or Organizations:      Attends Club or Organization Meetings:      Marital Status:    Intimate Partner Violence: " "     Fear of Current or Ex-Partner:      Emotionally Abused:      Physically Abused:      Sexually Abused:        No current facility-administered medications on file prior to encounter.  No current outpatient medications on file prior to encounter.        ALLERGIES/SENSITIVITIES: No Known Allergies    PHYSICAL EXAM:     BP (!) 152/100   Temp 96.9  F (36.1  C) (Tympanic)   Resp 10   Ht 1.778 m (5' 10\")   Wt 90.7 kg (200 lb)   SpO2 95%   BMI 28.70 kg/m      General Appearance:   Sitting up in bed, no apparent distress  HEENT: Pupils are equal and reactive, no scleral icterus   Heart & CV:  RRR, no murmur.  LUNGS: No increased work of breathing. Lungs are CTA B/L, no wheezing or crackles.  Abd:  soft, non-tender, no masses   Ext: no lower extremity edema   Neuro: alert and oriented, normal speech and mentation         CONSULTATION ASSESSMENT AND PLAN:    61 year old male with average risk for colon cancer.      The technical details of colonoscopy were discussed with the patient along with the risks and benefits to include bleeding, perforation and incomplete study. Julius Hansen demonstrated understanding and is willing to proceed.       Raul Tran MD on 11/2/2021 at 11:18 AM      "

## 2021-11-02 NOTE — OP NOTE
COLONOSCOPY PROCEDURE NOTE    DATE OF SERVICE: 11/2/2021    SURGEON: CHAITANYA Tran MD     PRE-OP DIAGNOSIS:    Healthcare maintenance   Iron Deficiency anemia    POST-OP DIAGNOSIS:    Internal hemorrhoids  Polyps at cecum    PROCEDURE:   Colonoscopy with biopsy    ASSISTANT:  Circulator: Isak Humphrey RN  Scrub Person: Yuki Gil    ANESTHESIA:  MAC                            Monitor Anesthesia CareCRNA Independent: Ray Norwood APRN CRNA    INDICATION FOR THE PROCEDURE: The patient is a 61 year old male with anemia and need for screening colonoscopy. The patient has no other complaints.  After explaining the risks to include bleeding, perforation, potential inability to reach the cecum the patient wishes to proceed.    PROCEDURE: After adequate sedation, the patient was in the left lateral decubitus position.  Rectal exam was performed.  There was normal tone and no palpable masses.  The colonoscope was introduced into the rectum and advanced to the cecum with Mild difficulty.  The patient's prep was excellent.  The terminal cecum was reached.  The cecum, ascending, transverse, descending and sigmoid colon were significant for one 2mm polyp in the cecum removed with cold forceps.  The scope was retroflexed in the rectum.  The anorectal junction was remarkable for inflamed internal hemorrhoids. These were biopsied with cold forceps.  The scope was straightened and removed.  The patient tolerated the procedure well.     ESTIMATED BLOOD LOSS: none    COMPLICATIONS:  None    TISSUE REMOVED:  Yes    RECOMMEND:    Follow-up pending pathology  Fiber        CHAITANYA Tran MD

## 2021-11-04 LAB
PATH REPORT.COMMENTS IMP SPEC: NORMAL
PATH REPORT.FINAL DX SPEC: NORMAL
PHOTO IMAGE: NORMAL

## 2021-11-09 ENCOUNTER — TELEPHONE (OUTPATIENT)
Dept: SURGERY | Facility: OTHER | Age: 61
End: 2021-11-09
Payer: COMMERCIAL

## 2021-11-09 NOTE — TELEPHONE ENCOUNTER
Patient states returning call to [facility] in [address]. He would like you to contact his girlfriend Deidra at [phone] as he is at work.     Collette Frost on 11/9/2021 at 4:43 PM

## 2021-11-10 NOTE — TELEPHONE ENCOUNTER
After verification, patient girlfriend notified.  Pao Horner LPN ....................11/10/2021   11:01 AM

## 2022-01-01 ENCOUNTER — TELEPHONE (OUTPATIENT)
Dept: SURGERY | Facility: OTHER | Age: 62
End: 2022-01-01

## 2022-01-01 ENCOUNTER — MYC MEDICAL ADVICE (OUTPATIENT)
Dept: SURGERY | Facility: OTHER | Age: 62
End: 2022-01-01

## 2022-01-01 ENCOUNTER — PATIENT OUTREACH (OUTPATIENT)
Dept: FAMILY MEDICINE | Facility: OTHER | Age: 62
End: 2022-01-01

## 2022-01-01 ENCOUNTER — OFFICE VISIT (OUTPATIENT)
Dept: SURGERY | Facility: OTHER | Age: 62
End: 2022-01-01
Attending: SURGERY
Payer: COMMERCIAL

## 2022-01-01 VITALS
BODY MASS INDEX: 28.83 KG/M2 | HEART RATE: 82 BPM | DIASTOLIC BLOOD PRESSURE: 85 MMHG | WEIGHT: 201.4 LBS | RESPIRATION RATE: 18 BRPM | TEMPERATURE: 98.8 F | SYSTOLIC BLOOD PRESSURE: 141 MMHG | OXYGEN SATURATION: 95 % | HEIGHT: 70 IN

## 2022-01-01 VITALS
DIASTOLIC BLOOD PRESSURE: 76 MMHG | OXYGEN SATURATION: 98 % | BODY MASS INDEX: 27.35 KG/M2 | TEMPERATURE: 98 F | SYSTOLIC BLOOD PRESSURE: 88 MMHG | HEART RATE: 90 BPM | WEIGHT: 190.6 LBS | RESPIRATION RATE: 20 BRPM

## 2022-01-01 DIAGNOSIS — F41.9 ANXIETY: ICD-10-CM

## 2022-01-01 DIAGNOSIS — G47.09 OTHER INSOMNIA: ICD-10-CM

## 2022-01-01 DIAGNOSIS — Z98.890 POSTOPERATIVE STATE: Primary | ICD-10-CM

## 2022-01-01 LAB
BACTERIA WND CULT: ABNORMAL
GLUCOSE BLDC GLUCOMTR-MCNC: 97 MG/DL (ref 70–99)
GRAM STAIN RESULT: ABNORMAL

## 2022-01-01 PROCEDURE — 250N000011 HC RX IP 250 OP 636: Performed by: SURGERY

## 2022-01-01 PROCEDURE — 250N000013 HC RX MED GY IP 250 OP 250 PS 637: Performed by: SURGERY

## 2022-01-01 PROCEDURE — 99024 POSTOP FOLLOW-UP VISIT: CPT | Performed by: SURGERY

## 2022-01-01 RX ORDER — ZOLPIDEM TARTRATE 5 MG/1
5 TABLET ORAL
Qty: 30 TABLET | Refills: 0 | Status: SHIPPED | OUTPATIENT
Start: 2022-01-01

## 2022-01-01 RX ORDER — LORAZEPAM 1 MG/1
1 TABLET ORAL EVERY 6 HOURS PRN
COMMUNITY
End: 2022-01-01

## 2022-01-01 RX ORDER — OXYCODONE AND ACETAMINOPHEN 5; 325 MG/1; MG/1
1 TABLET ORAL EVERY 6 HOURS PRN
Qty: 12 TABLET | Refills: 0 | Status: SHIPPED | OUTPATIENT
Start: 2022-01-01 | End: 2022-01-01

## 2022-01-01 RX ORDER — LORAZEPAM 1 MG/1
1 TABLET ORAL EVERY 6 HOURS PRN
Qty: 30 TABLET | Refills: 0 | Status: SHIPPED | OUTPATIENT
Start: 2022-01-01

## 2022-01-01 RX ORDER — ACETAMINOPHEN 325 MG/1
650 TABLET ORAL EVERY 6 HOURS PRN
Qty: 40 TABLET | Refills: 0 | Status: SHIPPED | OUTPATIENT
Start: 2022-01-01 | End: 2022-01-01

## 2022-01-01 RX ORDER — IBUPROFEN 600 MG/1
600 TABLET, FILM COATED ORAL 4 TIMES DAILY
Qty: 12 TABLET | Refills: 0 | Status: SHIPPED | OUTPATIENT
Start: 2022-01-01 | End: 2022-01-01

## 2022-01-01 RX ORDER — ZOLPIDEM TARTRATE 5 MG/1
5 TABLET ORAL
COMMUNITY
End: 2022-01-01

## 2022-01-01 RX ORDER — IBUPROFEN 600 MG/1
600 TABLET, FILM COATED ORAL EVERY 6 HOURS PRN
COMMUNITY

## 2022-01-01 RX ADMIN — TAZOBACTAM SODIUM AND PIPERACILLIN SODIUM 4.5 G: 500; 4 INJECTION, SOLUTION INTRAVENOUS at 08:08

## 2022-01-01 RX ADMIN — IBUPROFEN 600 MG: 600 TABLET ORAL at 00:28

## 2022-01-01 RX ADMIN — TAZOBACTAM SODIUM AND PIPERACILLIN SODIUM 4.5 G: 500; 4 INJECTION, SOLUTION INTRAVENOUS at 02:13

## 2022-01-01 RX ADMIN — ACETAMINOPHEN 975 MG: 325 TABLET ORAL at 08:33

## 2022-01-01 RX ADMIN — LORAZEPAM 1 MG: 1 TABLET ORAL at 00:33

## 2022-01-01 RX ADMIN — ENOXAPARIN SODIUM 40 MG: 40 INJECTION SUBCUTANEOUS at 08:33

## 2022-01-01 RX ADMIN — LORAZEPAM 1 MG: 1 TABLET ORAL at 06:24

## 2022-01-01 RX ADMIN — PANTOPRAZOLE SODIUM 40 MG: 40 TABLET, DELAYED RELEASE ORAL at 08:16

## 2022-01-01 RX ADMIN — IBUPROFEN 600 MG: 600 TABLET ORAL at 06:20

## 2022-01-01 RX ADMIN — Medication 1 ML: at 08:15

## 2022-01-01 RX ADMIN — ACETAMINOPHEN 975 MG: 325 TABLET ORAL at 02:14

## 2022-01-01 ASSESSMENT — ACTIVITIES OF DAILY LIVING (ADL)
ADLS_ACUITY_SCORE: 32

## 2022-01-01 ASSESSMENT — PAIN SCALES - GENERAL: PAINLEVEL: MODERATE PAIN (4)

## 2022-08-25 ENCOUNTER — APPOINTMENT (OUTPATIENT)
Dept: FAMILY MEDICINE | Facility: OTHER | Age: 62
End: 2022-08-25
Attending: NURSE PRACTITIONER

## 2022-08-25 PROCEDURE — 99499 UNLISTED E&M SERVICE: CPT | Performed by: NURSE PRACTITIONER

## 2022-09-04 ENCOUNTER — HEALTH MAINTENANCE LETTER (OUTPATIENT)
Age: 62
End: 2022-09-04

## 2022-11-07 ENCOUNTER — APPOINTMENT (OUTPATIENT)
Dept: GENERAL RADIOLOGY | Facility: OTHER | Age: 62
End: 2022-11-07
Attending: FAMILY MEDICINE
Payer: COMMERCIAL

## 2022-11-07 ENCOUNTER — HOSPITAL ENCOUNTER (INPATIENT)
Facility: OTHER | Age: 62
LOS: 9 days | Discharge: HOME | End: 2022-11-17
Attending: INTERNAL MEDICINE | Admitting: SURGERY
Payer: COMMERCIAL

## 2022-11-07 ENCOUNTER — APPOINTMENT (OUTPATIENT)
Dept: CT IMAGING | Facility: OTHER | Age: 62
End: 2022-11-07
Attending: INTERNAL MEDICINE
Payer: COMMERCIAL

## 2022-11-07 DIAGNOSIS — C68.9 UROTHELIAL CANCER (H): Primary | ICD-10-CM

## 2022-11-07 DIAGNOSIS — N28.9 NON-FUNCTIONING KIDNEY: ICD-10-CM

## 2022-11-07 DIAGNOSIS — N18.31 STAGE 3A CHRONIC KIDNEY DISEASE (H): ICD-10-CM

## 2022-11-07 DIAGNOSIS — N28.9 URETERAL SLUDGE: ICD-10-CM

## 2022-11-07 DIAGNOSIS — N18.31 CHRONIC KIDNEY DISEASE (CKD) STAGE G3A/A1, MODERATELY DECREASED GLOMERULAR FILTRATION RATE (GFR) BETWEEN 45-59 ML/MIN/1.73 SQUARE METER AND ALBUMINURIA CREATININE RATIO LESS THAN 30 MG/G (H): ICD-10-CM

## 2022-11-07 DIAGNOSIS — Z11.52 ENCOUNTER FOR SCREENING LABORATORY TESTING FOR COVID-19 VIRUS: ICD-10-CM

## 2022-11-07 DIAGNOSIS — K56.609 SMALL BOWEL OBSTRUCTION (H): ICD-10-CM

## 2022-11-07 LAB
ALBUMIN SERPL-MCNC: 4.3 G/DL (ref 3.5–5.7)
ALP SERPL-CCNC: 82 U/L (ref 34–104)
ALT SERPL W P-5'-P-CCNC: 25 U/L (ref 7–52)
ANION GAP SERPL CALCULATED.3IONS-SCNC: 9 MMOL/L (ref 3–14)
AST SERPL W P-5'-P-CCNC: 18 U/L (ref 13–39)
BASOPHILS # BLD AUTO: 0 10E3/UL (ref 0–0.2)
BASOPHILS NFR BLD AUTO: 1 %
BILIRUB SERPL-MCNC: 0.8 MG/DL (ref 0.3–1)
BUN SERPL-MCNC: 27 MG/DL (ref 7–25)
CALCIUM SERPL-MCNC: 9.9 MG/DL (ref 8.6–10.3)
CHLORIDE BLD-SCNC: 99 MMOL/L (ref 98–107)
CO2 SERPL-SCNC: 26 MMOL/L (ref 21–31)
CREAT SERPL-MCNC: 1.66 MG/DL (ref 0.7–1.3)
CRP SERPL-MCNC: 21.7 MG/L
EOSINOPHIL # BLD AUTO: 0 10E3/UL (ref 0–0.7)
EOSINOPHIL NFR BLD AUTO: 1 %
ERYTHROCYTE [DISTWIDTH] IN BLOOD BY AUTOMATED COUNT: 12.6 % (ref 10–15)
FLUAV RNA SPEC QL NAA+PROBE: NEGATIVE
FLUBV RNA RESP QL NAA+PROBE: NEGATIVE
GFR SERPL CREATININE-BSD FRML MDRD: 46 ML/MIN/1.73M2
GLUCOSE BLD-MCNC: 111 MG/DL (ref 70–105)
HCT VFR BLD AUTO: 47.6 % (ref 40–53)
HGB BLD-MCNC: 16.8 G/DL (ref 13.3–17.7)
HOLD SPECIMEN: NORMAL
IMM GRANULOCYTES # BLD: 0 10E3/UL
IMM GRANULOCYTES NFR BLD: 0 %
LIPASE SERPL-CCNC: 12 U/L (ref 11–82)
LYMPHOCYTES # BLD AUTO: 1.2 10E3/UL (ref 0.8–5.3)
LYMPHOCYTES NFR BLD AUTO: 29 %
MCH RBC QN AUTO: 30.8 PG (ref 26.5–33)
MCHC RBC AUTO-ENTMCNC: 35.3 G/DL (ref 31.5–36.5)
MCV RBC AUTO: 87 FL (ref 78–100)
MONOCYTES # BLD AUTO: 0.9 10E3/UL (ref 0–1.3)
MONOCYTES NFR BLD AUTO: 22 %
NEUTROPHILS # BLD AUTO: 2 10E3/UL (ref 1.6–8.3)
NEUTROPHILS NFR BLD AUTO: 47 %
NRBC # BLD AUTO: 0 10E3/UL
NRBC BLD AUTO-RTO: 0 /100
PLAT MORPH BLD: NORMAL
PLATELET # BLD AUTO: 277 10E3/UL (ref 150–450)
POTASSIUM BLD-SCNC: 4 MMOL/L (ref 3.5–5.1)
PROT SERPL-MCNC: 7.9 G/DL (ref 6.4–8.9)
RBC # BLD AUTO: 5.46 10E6/UL (ref 4.4–5.9)
RBC MORPH BLD: NORMAL
RSV RNA SPEC NAA+PROBE: NEGATIVE
SARS-COV-2 RNA RESP QL NAA+PROBE: NEGATIVE
SODIUM SERPL-SCNC: 134 MMOL/L (ref 134–144)
WBC # BLD AUTO: 4.2 10E3/UL (ref 4–11)

## 2022-11-07 PROCEDURE — 36415 COLL VENOUS BLD VENIPUNCTURE: CPT | Performed by: INTERNAL MEDICINE

## 2022-11-07 PROCEDURE — 96375 TX/PRO/DX INJ NEW DRUG ADDON: CPT | Performed by: INTERNAL MEDICINE

## 2022-11-07 PROCEDURE — 86140 C-REACTIVE PROTEIN: CPT | Performed by: INTERNAL MEDICINE

## 2022-11-07 PROCEDURE — C9803 HOPD COVID-19 SPEC COLLECT: HCPCS | Performed by: INTERNAL MEDICINE

## 2022-11-07 PROCEDURE — 74178 CT ABD&PLV WO CNTR FLWD CNTR: CPT | Mod: TC

## 2022-11-07 PROCEDURE — 85025 COMPLETE CBC W/AUTO DIFF WBC: CPT | Performed by: INTERNAL MEDICINE

## 2022-11-07 PROCEDURE — 250N000011 HC RX IP 250 OP 636: Performed by: INTERNAL MEDICINE

## 2022-11-07 PROCEDURE — 96374 THER/PROPH/DIAG INJ IV PUSH: CPT | Mod: XU | Performed by: INTERNAL MEDICINE

## 2022-11-07 PROCEDURE — 258N000003 HC RX IP 258 OP 636: Performed by: INTERNAL MEDICINE

## 2022-11-07 PROCEDURE — 99291 CRITICAL CARE FIRST HOUR: CPT | Mod: 25 | Performed by: INTERNAL MEDICINE

## 2022-11-07 PROCEDURE — 87637 SARSCOV2&INF A&B&RSV AMP PRB: CPT | Performed by: INTERNAL MEDICINE

## 2022-11-07 PROCEDURE — 250N000011 HC RX IP 250 OP 636: Performed by: FAMILY MEDICINE

## 2022-11-07 PROCEDURE — 96376 TX/PRO/DX INJ SAME DRUG ADON: CPT | Performed by: INTERNAL MEDICINE

## 2022-11-07 PROCEDURE — 83690 ASSAY OF LIPASE: CPT | Performed by: INTERNAL MEDICINE

## 2022-11-07 PROCEDURE — 80053 COMPREHEN METABOLIC PANEL: CPT | Performed by: INTERNAL MEDICINE

## 2022-11-07 PROCEDURE — 82040 ASSAY OF SERUM ALBUMIN: CPT | Performed by: INTERNAL MEDICINE

## 2022-11-07 PROCEDURE — 999N000065 XR CHEST PORT 1 VIEW: Mod: TC

## 2022-11-07 PROCEDURE — 99285 EMERGENCY DEPT VISIT HI MDM: CPT | Performed by: INTERNAL MEDICINE

## 2022-11-07 RX ORDER — IOPAMIDOL 755 MG/ML
100 INJECTION, SOLUTION INTRAVASCULAR ONCE
Status: COMPLETED | OUTPATIENT
Start: 2022-11-07 | End: 2022-11-07

## 2022-11-07 RX ORDER — MORPHINE SULFATE 4 MG/ML
4 INJECTION, SOLUTION INTRAMUSCULAR; INTRAVENOUS
Status: COMPLETED | OUTPATIENT
Start: 2022-11-07 | End: 2022-11-07

## 2022-11-07 RX ORDER — FENTANYL CITRATE 50 UG/ML
50 INJECTION, SOLUTION INTRAMUSCULAR; INTRAVENOUS ONCE
Status: COMPLETED | OUTPATIENT
Start: 2022-11-07 | End: 2022-11-07

## 2022-11-07 RX ORDER — SODIUM CHLORIDE 9 MG/ML
INJECTION, SOLUTION INTRAVENOUS CONTINUOUS
Status: DISCONTINUED | OUTPATIENT
Start: 2022-11-07 | End: 2022-11-07

## 2022-11-07 RX ORDER — FENTANYL CITRATE 50 UG/ML
50 INJECTION, SOLUTION INTRAMUSCULAR; INTRAVENOUS EVERY 30 MIN PRN
Status: DISCONTINUED | OUTPATIENT
Start: 2022-11-07 | End: 2022-11-08 | Stop reason: ALTCHOICE

## 2022-11-07 RX ORDER — ONDANSETRON 2 MG/ML
4 INJECTION INTRAMUSCULAR; INTRAVENOUS
Status: DISCONTINUED | OUTPATIENT
Start: 2022-11-07 | End: 2022-11-08 | Stop reason: ALTCHOICE

## 2022-11-07 RX ORDER — ONDANSETRON 2 MG/ML
4 INJECTION INTRAMUSCULAR; INTRAVENOUS ONCE
Status: DISCONTINUED | OUTPATIENT
Start: 2022-11-07 | End: 2022-11-07

## 2022-11-07 RX ORDER — NICOTINE 21 MG/24HR
1 PATCH, TRANSDERMAL 24 HOURS TRANSDERMAL DAILY
Status: DISCONTINUED | OUTPATIENT
Start: 2022-11-08 | End: 2022-11-10

## 2022-11-07 RX ORDER — ONDANSETRON 2 MG/ML
4 INJECTION INTRAMUSCULAR; INTRAVENOUS ONCE
Status: COMPLETED | OUTPATIENT
Start: 2022-11-07 | End: 2022-11-07

## 2022-11-07 RX ORDER — MORPHINE SULFATE 4 MG/ML
4 INJECTION, SOLUTION INTRAMUSCULAR; INTRAVENOUS
Status: DISCONTINUED | OUTPATIENT
Start: 2022-11-07 | End: 2022-11-07

## 2022-11-07 RX ADMIN — MORPHINE SULFATE 4 MG: 4 INJECTION, SOLUTION INTRAMUSCULAR; INTRAVENOUS at 22:09

## 2022-11-07 RX ADMIN — MORPHINE SULFATE 4 MG: 4 INJECTION, SOLUTION INTRAMUSCULAR; INTRAVENOUS at 21:35

## 2022-11-07 RX ADMIN — SODIUM CHLORIDE: 9 INJECTION, SOLUTION INTRAVENOUS at 21:18

## 2022-11-07 RX ADMIN — HYDROMORPHONE HYDROCHLORIDE 1 MG: 1 INJECTION, SOLUTION INTRAMUSCULAR; INTRAVENOUS; SUBCUTANEOUS at 23:11

## 2022-11-07 RX ADMIN — MORPHINE SULFATE 4 MG: 4 INJECTION, SOLUTION INTRAMUSCULAR; INTRAVENOUS at 21:18

## 2022-11-07 RX ADMIN — ONDANSETRON 4 MG: 2 INJECTION INTRAMUSCULAR; INTRAVENOUS at 21:18

## 2022-11-07 RX ADMIN — FENTANYL CITRATE 50 MCG: 50 INJECTION, SOLUTION INTRAMUSCULAR; INTRAVENOUS at 23:02

## 2022-11-07 RX ADMIN — IOPAMIDOL 100 ML: 755 INJECTION, SOLUTION INTRAVENOUS at 22:52

## 2022-11-07 RX ADMIN — SODIUM CHLORIDE 500 ML: 9 INJECTION, SOLUTION INTRAVENOUS at 23:03

## 2022-11-07 ASSESSMENT — ACTIVITIES OF DAILY LIVING (ADL)
ADLS_ACUITY_SCORE: 33
ADLS_ACUITY_SCORE: 35

## 2022-11-08 PROBLEM — L71.9 ROSACEA: Status: ACTIVE | Noted: 2022-01-26

## 2022-11-08 PROBLEM — D64.9 LOW HEMOGLOBIN: Status: RESOLVED | Noted: 2021-09-16 | Resolved: 2022-11-08

## 2022-11-08 PROBLEM — C67.2 MALIGNANT NEOPLASM OF LATERAL WALL OF URINARY BLADDER (H): Status: ACTIVE | Noted: 2022-07-22

## 2022-11-08 PROBLEM — N28.9 NON-FUNCTIONING KIDNEY: Status: RESOLVED | Noted: 2022-11-07 | Resolved: 2022-11-08

## 2022-11-08 PROBLEM — C68.9 UROTHELIAL CANCER (H): Status: ACTIVE | Noted: 2022-02-21

## 2022-11-08 PROBLEM — Z86.0101 H/O ADENOMATOUS POLYP OF COLON: Status: ACTIVE | Noted: 2022-11-08

## 2022-11-08 PROBLEM — K57.30 DIVERTICULOSIS OF LARGE INTESTINE: Status: ACTIVE | Noted: 2022-11-08

## 2022-11-08 PROBLEM — N18.31 STAGE 3A CHRONIC KIDNEY DISEASE (H): Status: ACTIVE | Noted: 2022-11-08

## 2022-11-08 PROBLEM — N28.9 NON-FUNCTIONING KIDNEY: Status: RESOLVED | Noted: 2022-11-08 | Resolved: 2022-11-08

## 2022-11-08 PROBLEM — Z90.5 S/P NEPHRECTOMY: Status: ACTIVE | Noted: 2022-11-08

## 2022-11-08 PROBLEM — N13.30 HYDRONEPHROSIS, RIGHT: Status: RESOLVED | Noted: 2021-03-25 | Resolved: 2022-11-08

## 2022-11-08 PROBLEM — R31.0 GROSS HEMATURIA: Status: RESOLVED | Noted: 2021-03-25 | Resolved: 2022-11-08

## 2022-11-08 PROBLEM — N28.9 NON-FUNCTIONING KIDNEY: Status: ACTIVE | Noted: 2022-11-08

## 2022-11-08 LAB
ALBUMIN SERPL-MCNC: 4.3 G/DL (ref 3.5–5.7)
ALP SERPL-CCNC: 82 U/L (ref 34–104)
ALT SERPL W P-5'-P-CCNC: 23 U/L (ref 7–52)
ANION GAP SERPL CALCULATED.3IONS-SCNC: 6 MMOL/L (ref 3–14)
AST SERPL W P-5'-P-CCNC: 18 U/L (ref 13–39)
BASOPHILS # BLD AUTO: 0 10E3/UL (ref 0–0.2)
BASOPHILS NFR BLD AUTO: 0 %
BILIRUB SERPL-MCNC: 0.8 MG/DL (ref 0.3–1)
BUN SERPL-MCNC: 26 MG/DL (ref 7–25)
CALCIUM SERPL-MCNC: 9.7 MG/DL (ref 8.6–10.3)
CHLORIDE BLD-SCNC: 99 MMOL/L (ref 98–107)
CO2 SERPL-SCNC: 30 MMOL/L (ref 21–31)
CREAT SERPL-MCNC: 1.59 MG/DL (ref 0.7–1.3)
CRP SERPL HS-MCNC: 23 MG/L
EOSINOPHIL # BLD AUTO: 0 10E3/UL (ref 0–0.7)
EOSINOPHIL NFR BLD AUTO: 1 %
ERYTHROCYTE [DISTWIDTH] IN BLOOD BY AUTOMATED COUNT: 12.7 % (ref 10–15)
GFR SERPL CREATININE-BSD FRML MDRD: 49 ML/MIN/1.73M2
GLUCOSE BLD-MCNC: 109 MG/DL (ref 70–105)
HCT VFR BLD AUTO: 47.7 % (ref 40–53)
HGB BLD-MCNC: 16.3 G/DL (ref 13.3–17.7)
IMM GRANULOCYTES # BLD: 0 10E3/UL
IMM GRANULOCYTES NFR BLD: 0 %
LYMPHOCYTES # BLD AUTO: 1.2 10E3/UL (ref 0.8–5.3)
LYMPHOCYTES NFR BLD AUTO: 26 %
MCH RBC QN AUTO: 30.3 PG (ref 26.5–33)
MCHC RBC AUTO-ENTMCNC: 34.2 G/DL (ref 31.5–36.5)
MCV RBC AUTO: 89 FL (ref 78–100)
MONOCYTES # BLD AUTO: 1 10E3/UL (ref 0–1.3)
MONOCYTES NFR BLD AUTO: 22 %
NEUTROPHILS # BLD AUTO: 2.3 10E3/UL (ref 1.6–8.3)
NEUTROPHILS NFR BLD AUTO: 51 %
NRBC # BLD AUTO: 0 10E3/UL
NRBC BLD AUTO-RTO: 0 /100
PLATELET # BLD AUTO: 250 10E3/UL (ref 150–450)
POTASSIUM BLD-SCNC: 4.6 MMOL/L (ref 3.5–5.1)
PROT SERPL-MCNC: 7.3 G/DL (ref 6.4–8.9)
RBC # BLD AUTO: 5.38 10E6/UL (ref 4.4–5.9)
SODIUM SERPL-SCNC: 135 MMOL/L (ref 134–144)
WBC # BLD AUTO: 4.6 10E3/UL (ref 4–11)

## 2022-11-08 PROCEDURE — 86141 C-REACTIVE PROTEIN HS: CPT | Performed by: FAMILY MEDICINE

## 2022-11-08 PROCEDURE — 36415 COLL VENOUS BLD VENIPUNCTURE: CPT | Performed by: FAMILY MEDICINE

## 2022-11-08 PROCEDURE — 250N000011 HC RX IP 250 OP 636: Performed by: FAMILY MEDICINE

## 2022-11-08 PROCEDURE — 85025 COMPLETE CBC W/AUTO DIFF WBC: CPT | Performed by: FAMILY MEDICINE

## 2022-11-08 PROCEDURE — 99222 1ST HOSP IP/OBS MODERATE 55: CPT | Mod: 25 | Performed by: SURGERY

## 2022-11-08 PROCEDURE — 120N000001 HC R&B MED SURG/OB

## 2022-11-08 PROCEDURE — 96376 TX/PRO/DX INJ SAME DRUG ADON: CPT | Performed by: INTERNAL MEDICINE

## 2022-11-08 PROCEDURE — 80053 COMPREHEN METABOLIC PANEL: CPT | Performed by: FAMILY MEDICINE

## 2022-11-08 PROCEDURE — 99222 1ST HOSP IP/OBS MODERATE 55: CPT | Performed by: FAMILY MEDICINE

## 2022-11-08 PROCEDURE — 250N000013 HC RX MED GY IP 250 OP 250 PS 637: Performed by: FAMILY MEDICINE

## 2022-11-08 PROCEDURE — 258N000003 HC RX IP 258 OP 636: Performed by: FAMILY MEDICINE

## 2022-11-08 PROCEDURE — 96375 TX/PRO/DX INJ NEW DRUG ADDON: CPT | Performed by: INTERNAL MEDICINE

## 2022-11-08 RX ORDER — NALOXONE HYDROCHLORIDE 0.4 MG/ML
0.2 INJECTION, SOLUTION INTRAMUSCULAR; INTRAVENOUS; SUBCUTANEOUS
Status: DISCONTINUED | OUTPATIENT
Start: 2022-11-08 | End: 2022-11-10

## 2022-11-08 RX ORDER — MULTIPLE VITAMINS W/ MINERALS TAB 9MG-400MCG
1 TAB ORAL DAILY
COMMUNITY

## 2022-11-08 RX ORDER — LORAZEPAM 2 MG/ML
0.5 INJECTION INTRAMUSCULAR ONCE
Status: CANCELLED | OUTPATIENT
Start: 2022-11-08 | End: 2022-11-08

## 2022-11-08 RX ORDER — NALOXONE HYDROCHLORIDE 0.4 MG/ML
0.4 INJECTION, SOLUTION INTRAMUSCULAR; INTRAVENOUS; SUBCUTANEOUS
Status: DISCONTINUED | OUTPATIENT
Start: 2022-11-08 | End: 2022-11-10

## 2022-11-08 RX ORDER — HYDROMORPHONE HCL IN WATER/PF 6 MG/30 ML
0.2 PATIENT CONTROLLED ANALGESIA SYRINGE INTRAVENOUS
Status: DISCONTINUED | OUTPATIENT
Start: 2022-11-08 | End: 2022-11-10

## 2022-11-08 RX ORDER — LIDOCAINE 40 MG/G
CREAM TOPICAL
Status: DISCONTINUED | OUTPATIENT
Start: 2022-11-08 | End: 2022-11-10

## 2022-11-08 RX ORDER — SODIUM CHLORIDE 9 MG/ML
INJECTION, SOLUTION INTRAVENOUS CONTINUOUS
Status: DISCONTINUED | OUTPATIENT
Start: 2022-11-08 | End: 2022-11-10

## 2022-11-08 RX ORDER — ONDANSETRON 4 MG/1
4 TABLET, ORALLY DISINTEGRATING ORAL EVERY 6 HOURS PRN
Status: DISCONTINUED | OUTPATIENT
Start: 2022-11-08 | End: 2022-11-10

## 2022-11-08 RX ORDER — HYDROMORPHONE HYDROCHLORIDE 2 MG/1
2 TABLET ORAL EVERY 4 HOURS PRN
Status: DISCONTINUED | OUTPATIENT
Start: 2022-11-08 | End: 2022-11-10

## 2022-11-08 RX ORDER — LORAZEPAM 2 MG/ML
0.5 INJECTION INTRAMUSCULAR ONCE
Status: COMPLETED | OUTPATIENT
Start: 2022-11-08 | End: 2022-11-08

## 2022-11-08 RX ORDER — ONDANSETRON 2 MG/ML
4 INJECTION INTRAMUSCULAR; INTRAVENOUS EVERY 6 HOURS PRN
Status: DISCONTINUED | OUTPATIENT
Start: 2022-11-08 | End: 2022-11-10

## 2022-11-08 RX ADMIN — LORAZEPAM 0.5 MG: 2 INJECTION, SOLUTION INTRAMUSCULAR; INTRAVENOUS at 09:44

## 2022-11-08 RX ADMIN — LORAZEPAM 0.5 MG: 2 INJECTION, SOLUTION INTRAMUSCULAR; INTRAVENOUS at 12:53

## 2022-11-08 RX ADMIN — HYDROMORPHONE HYDROCHLORIDE 0.2 MG: 0.2 INJECTION, SOLUTION INTRAMUSCULAR; INTRAVENOUS; SUBCUTANEOUS at 17:48

## 2022-11-08 RX ADMIN — Medication 1 ML: at 22:37

## 2022-11-08 RX ADMIN — SODIUM CHLORIDE: 9 INJECTION, SOLUTION INTRAVENOUS at 17:47

## 2022-11-08 RX ADMIN — FAMOTIDINE 20 MG: 10 INJECTION INTRAVENOUS at 12:06

## 2022-11-08 RX ADMIN — FAMOTIDINE 20 MG: 10 INJECTION INTRAVENOUS at 21:26

## 2022-11-08 ASSESSMENT — ACTIVITIES OF DAILY LIVING (ADL)
DIFFICULTY_COMMUNICATING: NO
DRESSING/BATHING_DIFFICULTY: NO
CHANGE_IN_FUNCTIONAL_STATUS_SINCE_ONSET_OF_CURRENT_ILLNESS/INJURY: NO
ADLS_ACUITY_SCORE: 35
ADLS_ACUITY_SCORE: 35
TOILETING_ISSUES: NO
ADLS_ACUITY_SCORE: 35
FALL_HISTORY_WITHIN_LAST_SIX_MONTHS: NO
ADLS_ACUITY_SCORE: 35
WALKING_OR_CLIMBING_STAIRS_DIFFICULTY: NO
DIFFICULTY_EATING/SWALLOWING: NO
HEARING_DIFFICULTY_OR_DEAF: NO
ADLS_ACUITY_SCORE: 20
ADLS_ACUITY_SCORE: 35
ADLS_ACUITY_SCORE: 20
ADLS_ACUITY_SCORE: 22
ADLS_ACUITY_SCORE: 22
ADLS_ACUITY_SCORE: 35
VISION_MANAGEMENT: GLASSES
ADLS_ACUITY_SCORE: 35
ADLS_ACUITY_SCORE: 22
CONCENTRATING,_REMEMBERING_OR_MAKING_DECISIONS_DIFFICULTY: NO
WEAR_GLASSES_OR_BLIND: YES
DOING_ERRANDS_INDEPENDENTLY_DIFFICULTY: NO

## 2022-11-08 NOTE — ED PROVIDER NOTES
Patient been hemodynamically stable here in the ED.  Symptomatic control Ativan here in the ED.  Discussed with Dr. Dumont.  Patient will be admitted here to [facility] once a bed becomes available.  Discussed also with Dr. Rush.     Kvng Guzmán MD  11/08/22 6820

## 2022-11-08 NOTE — H&P (VIEW-ONLY)
Surgical  Consult  Referring physician:  YUDY Singh  Primary physician:     Dorota Posada    Chief complaint:   Small bowel obstruction    History of present illness:  This is a 62 year old male I am seeing in consultation for a small bowel obstruction.  Patient began having abdominal pain on 11/4/2022 along with associated nausea and vomiting.  Last bowel movement was 11/6/2022.  Came to the emergency room last night and has had an NG tube placed.  Still complaining of nausea and hiccups.  NG was re-positioned and irrigated.  Total output 1700 mL.  CT scan was reviewed with Dr. Schafer and is consistent with a small bowel obstruction with likely transition in the right gutter.  Patient's only prior abdominal surgery was a robotic right nephrectomy in August 2022.     Past medical history:   Past Medical History:   Diagnosis Date     Personal history of other medical treatment (CODE)     No Comments Provided       Pastsurgical history:  Past Surgical History:   Procedure Laterality Date     COLONOSCOPY N/A 11/02/2021    1 small tubular adenoma, follow up 11/2/2026     CYSTOSCOPY, URETEROSCOPY, COMBINED Right 04/06/2021    Procedure: Cystoscopy and right retrograde pyelogram and diagnostic ureteroscopy;  Surgeon: Trevon Montalvo MD;  Location:  OR     EGD  07/08/2022     NEPHRECTOMY Right 03/01/2022    urothelial carcinoma     TRANSURETHRAL RESECTION OF BLADDER N/A 08/17/2022       Current medications:  Current Outpatient Medications   Medication Sig Dispense Refill     ferrous gluconate (FERGON) 324 (38 Fe) MG tablet Take 1 tablet (324 mg) by mouth daily (with breakfast) 90 tablet 3     nicotine (NICODERM CQ) 21 MG/24HR 24 hr patch Place 1 patch onto the skin every 24 hours 28 patch 4     nicotine (NICORETTE) 4 MG lozenge Place 1 lozenge (4 mg) inside cheek every hour as needed for smoking cessation 27 lozenge 11       Allergies:  No Known Allergies    Family history:  Family History   Problem Relation Age of  "Onset     No Known Problems Mother      Hypertension Father      Hyperlipidemia Father      Dementia Father      Lung Cancer Sister      No Known Problems Sister      No Known Problems Brother        Social history:  Social History     Socioeconomic History     Marital status:      Spouse name: Not on file     Number of children: Not on file     Years of education: Not on file     Highest education level: Not on file   Occupational History     Not on file   Tobacco Use     Smoking status: Every Day     Packs/day: 1.00     Years: 20.00     Pack years: 20.00     Types: Cigarettes     Smokeless tobacco: Never   Vaping Use     Vaping Use: Never used   Substance and Sexual Activity     Alcohol use: Yes     Comment: \"every night I have a couple after work\"     Drug use: Yes     Types: Marijuana     Sexual activity: Yes     Partners: Female     Birth control/protection: Surgical   Other Topics Concern     Not on file   Social History Narrative    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  Will be going to [facility] in 10/2013 to work for up to 2 years.     Social Determinants of Health     Financial Resource Strain: Not on file   Food Insecurity: Not on file   Transportation Needs: Not on file   Physical Activity: Not on file   Stress: Not on file   Social Connections: Not on file   Intimate Partner Violence: Not on file   Housing Stability: Not on file       PROBLEM LIST:  Patient Active Problem List   Diagnosis     Facial paralysis/[place] palsy     Marijuana use     Lyme disease     Tobacco dependence     Elevated serum creatinine     Elevated blood pressure reading without diagnosis of hypertension     Chronic kidney disease, stage 3     Small bowel obstruction (H)     Rosacea     Urothelial cancer (H)     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)     Diverticulosis of large intestine     H/O adenomatous polyp of colon     S/p nephrectomy right     Non-functioning kidney     Stage 3a chronic kidney disease " "(H)       Review of Systems:  COMPLETE REVIEW OF SYSTEMS:  Denies problems  Gastrointestinal:  See HPI  Cardiovascular:  Denies problems  Respiratory:  Denies problems  Genitourinary:  Due for follow-up cystoscopy for urothelial carcinoma of the bladder  Musculoskeletal:  Denies problems  Skin:  Denies problems  Neurologic:  Denies problems  Psychiatric:  Denies problems  Endocrine:  Denies problems  Heme/Lymphatic:  Denies problems  Vascular:  Denies problems    Physical exam: BP (!) 137/95   Pulse 72   Temp 98.6  F (37  C) (Tympanic)   Resp 26   Ht 1.778 m (5' 10\")   Wt 93 kg (205 lb)   SpO2 95%   BMI 29.41 kg/m      General: this is a pleasant male patient in no acute distress.  Patient is awake alert and oriented x3 .   EXAM:  Chest/Respiratory Exam: Normal - Clear to auscultation without rales, rhonchi, or wheezing.  Cardiovascular Exam: normal, regular rate and rhythm  Abdomen: Multiple laparoscopic scars plus upper abdominal scar just to the left of midline.  Distended.  Tympanic.  Bowel sounds with rushes and tinkles.  No tenderness to percussion or palpation.     Assessment:   Small bowel obstruction.  Discussed the significance of determining partial versus complete obstruction and the chances of responding to nonoperative or operative therapy.     Plan:    NG decompression  IV fluids  Gastrografin challenge overnight      Mirza Dumont MD        "

## 2022-11-08 NOTE — PROGRESS NOTES
Admission Note    Data:  Julius Hansen admitted to 318 from emergency room at 1433.      Action:  Dr. Dumont has been notified of admission. Pt oriented to unit, call light in reach.     Response:  Patient tolerated transfer well.

## 2022-11-08 NOTE — ED TRIAGE NOTES
Pt arrives to the ED via private car with abdominal pain.  Pt states this has been going on for 4 days and was seen at [facility] today and is scheduled for a CT scan tomorrow.  Pt states that he cannot wait until tomorrow due to the pain.  Pt states that when this attack came on he had just eaten some chicken noodle soup and a glass of milk.  Pt appears to be in an extreme amount of pain.     Triage Assessment     Row Name 11/07/22 1953       Triage Assessment (Adult)    Airway WDL WDL       Respiratory WDL    Respiratory WDL WDL       Skin Circulation/Temperature WDL    Skin Circulation/Temperature WDL WDL       Cardiac WDL    Cardiac WDL WDL       Peripheral/Neurovascular WDL    Peripheral Neurovascular WDL WDL       Cognitive/Neuro/Behavioral WDL    Cognitive/Neuro/Behavioral WDL WDL

## 2022-11-08 NOTE — ED PROVIDER NOTES
Emergency Department Provider Note  : 1960 Age: 62 year old Sex: male MRN: [id number]    Chief Complaint   Patient presents with     Abdominal Pain       Medical Decision Making / Assessment / Plan   62 year old male presenting with small bowel obstruction.      ED Course as of 22 2313   Mon 20222022 Patient evaluated.  He is lying on the floor in the EMS room.  He did not want to sit in the chair.  He is being moved to a different ER room with an emergency room cot.    Abdomen is very distended.  Having a lot of abdominal pain.  History of multiple abdominal surgeries.  Concern for bowel obstruction.  Has been quite nauseated.  Labs and CT ordered.    Zofran, IV morphine ordered.  Likely needs NG tube, surgery consult.    COVID, influenza, RSV negative.  Lipase normal.  CBC normal.  Creatinine is 1.6 with GFR 46.  Elevated BUN.  CRP is 21.7.      Has used all 3 doses of IV morphine.  Continues with normal saline at 125 mL/h.  CT abdomen and pelvis with and without contrast pending.    Given that his right kidney is nonfunctional and has been for years.  Morphine was only minimally effective for pain relief.  We will switch to IV fentanyl.  500 mL saline bolus ordered, given his contrast load and only 1 functional kidney.    CT scan shows small bowel obstruction.  NG tube ordered.    Patient signed out to night shift per routine change of shift.      Final diagnosis and disposition pending at time of routine change of shift.    New Prescriptions    No medications on file       Final diagnoses:   Small bowel obstruction (H)   Non-functioning kidney   Stage 3a chronic kidney disease (H)       Chris Paez MD  2022   Emergency Department    Jagdeep Madrid is a 62 year old male who presents at  8:12 PM with abdominal distention, abdominal pain.  Nausea.  Pain has been slowly progressing over the last 4 days.  It gets much worse after trying to eat or drink anything.   "He had some chicken soup and milk this evening and developed rather severe pain.  Most of his pain seems to be in the lower abdomen.  Feels like his abdomen is very distended and stretched out.  He is having nausea.    Has had multiple abdominal surgeries in the past.  Patient saw a clinic provider recently and actually has a CT scan scheduled for tomorrow.  He was not able to wait until that time to get imaging done.    Denies fevers or chills.  He is nauseated.  Very uncomfortable.  Has less pain if he is laying on his back.  Standing up worsens the pain.  Denies chest pain.  No rashes or sores.  Has had some liquid stool output but no formed stools.  Now is not really passing any gas.    I have reviewed the Medications, Allergies, Past Medical and Surgical History, and Social History in the [employer] System and with family.    Review of Systems:  Please see Subjective / HPI for pertinent positives and negatives. All other systems reviewed and found to be negative.      Objective     Patient Vitals for the past 24 hrs:   BP Temp Temp src Pulse Resp SpO2 Height Weight   11/07/22 2205 (!) 153/96 -- -- -- -- 95 % -- --   11/07/22 2135 (!) 153/105 -- -- 80 -- 96 % -- --   11/07/22 1956 (!) 138/95 98.6  F (37  C) Tympanic 106 26 97 % 1.778 m (5' 10\") 93 kg (205 lb)       Physical Exam:   General: Awake, alert, very uncomfortable due to pain.  He is laying on the floor.  Abdomen is distended.  Head: Normocephalic, atraumatic.  Eyes: Conjugate gaze.  ENT: Oral somewhat dry mucous membranes, external ear appears normal.   Chest/Respiratory: Equal chest rise, clear bilaterally.  Cardiovascular: Peripheral pulses present, regular rhythm, tachycardia..  Abdominal: Soft, distended.  Tinkling bowel sounds.  Generalized tenderness, worse in the lower abdomen.  Multiple old abdominal surgical scars.  Extremities: No obvious deformity.  Neurological: GCS 15, moving all extremities without gross deficit.  Skin: Warm, no rashes, " lesions, or bruising.   Psychiatric: Appropriate affect.     Procedures / Critical Care   Procedures    Critical Care Time: None.     Orders Placed This Encounter   Procedures     CT Abdomen Pelvis w/o & w Contrast     Comprehensive metabolic panel     Lipase     CRP inflammation     Asymptomatic Influenza A/B & SARS-CoV2 (COVID-19) Virus PCR Multiplex     CBC with platelets and differential     Extra Tube     Extra Blue Top Tube     Extra Serum Separator Tube (SST)     Extra Green Top (Lithium Heparin) Tube     RBC and Platelet Morphology     Peripheral IV catheter     Nasogastric tube decompression     CBC with platelets differential       RESULTS:  Results for orders placed or performed during the hospital encounter of 11/07/22   Comprehensive metabolic panel     Status: Abnormal   Result Value Ref Range    Sodium 134 134 - 144 mmol/L    Potassium 4.0 3.5 - 5.1 mmol/L    Chloride 99 98 - 107 mmol/L    Carbon Dioxide (CO2) 26 21 - 31 mmol/L    Anion Gap 9 3 - 14 mmol/L    Urea Nitrogen 27 (H) 7 - 25 mg/dL    Creatinine 1.66 (H) 0.70 - 1.30 mg/dL    Calcium 9.9 8.6 - 10.3 mg/dL    Glucose 111 (H) 70 - 105 mg/dL    Alkaline Phosphatase 82 34 - 104 U/L    AST 18 13 - 39 U/L    ALT 25 7 - 52 U/L    Protein Total 7.9 6.4 - 8.9 g/dL    Albumin 4.3 3.5 - 5.7 g/dL    Bilirubin Total 0.8 0.3 - 1.0 mg/dL    GFR Estimate 46 (L) >60 mL/min/1.73m2   Lipase     Status: Normal   Result Value Ref Range    Lipase 12 11 - 82 U/L   CRP inflammation     Status: Abnormal   Result Value Ref Range    CRP Inflammation 21.7 (H) <10.0 mg/L   Asymptomatic Influenza A/B & SARS-CoV2 (COVID-19) Virus PCR Multiplex Nose     Status: Normal    Specimen: Nose; Swab   Result Value Ref Range    Influenza A PCR Negative Negative    Influenza B PCR Negative Negative    RSV PCR Negative Negative    SARS CoV2 PCR Negative Negative    Narrative    Testing was performed using the Xpert Xpress CoV2/Flu/RSV Assay on the [employer] Instrument. This  test should be ordered for the detection of SARS-CoV-2 and influenza viruses in individuals who meet clinical and/or epidemiological criteria. Test performance is unknown in asymptomatic patients. This test is for in vitro diagnostic use under the FDA EUA for laboratories certified under CLIA to perform high or moderate complexity testing. This test has not been FDA cleared or approved. A negative result does not rule out the presence of PCR inhibitors in the specimen or target RNA in concentration below the limit of detection for the assay. If only one viral target is positive but coinfection with multiple targets is suspected, the sample should be re-tested with another FDA cleared, approved, or authorized test, if coinfection would change clinical management. This test was validated by the [facility]. These laboratories are certified under the Clinical Laboratory Improvement Amendments of 1988 (CLIA-88) as qualified to perform high complexity laboratory testing.   CBC with platelets and differential     Status: None   Result Value Ref Range    WBC Count 4.2 4.0 - 11.0 10e3/uL    RBC Count 5.46 4.40 - 5.90 10e6/uL    Hemoglobin 16.8 13.3 - 17.7 g/dL    Hematocrit 47.6 40.0 - 53.0 %    MCV 87 78 - 100 fL    MCH 30.8 26.5 - 33.0 pg    MCHC 35.3 31.5 - 36.5 g/dL    RDW 12.6 10.0 - 15.0 %    Platelet Count 277 150 - 450 10e3/uL    % Neutrophils 47 %    % Lymphocytes 29 %    % Monocytes 22 %    % Eosinophils 1 %    % Basophils 1 %    % Immature Granulocytes 0 %    NRBCs per 100 WBC 0 <1 /100    Absolute Neutrophils 2.0 1.6 - 8.3 10e3/uL    Absolute Lymphocytes 1.2 0.8 - 5.3 10e3/uL    Absolute Monocytes 0.9 0.0 - 1.3 10e3/uL    Absolute Eosinophils 0.0 0.0 - 0.7 10e3/uL    Absolute Basophils 0.0 0.0 - 0.2 10e3/uL    Absolute Immature Granulocytes 0.0 <=0.4 10e3/uL    Absolute NRBCs 0.0 10e3/uL   Extra Tube     Status: None    Narrative    The following orders were created for panel order Extra  Tube.  Procedure                               Abnormality         Status                     ---------                               -----------         ------                     Extra Blue Top Tube[[id number]]                              Final result               Extra Serum Separator Tu...[[id number]]                      Final result               Extra Green Top (Lithium...[[id number]]                      Final result                 Please view results for these tests on the individual orders.   Extra Blue Top Tube     Status: None   Result Value Ref Range    Hold Specimen Hold    Extra Serum Separator Tube (SST)     Status: None   Result Value Ref Range    Hold Specimen Hold    Extra Green Top (Lithium Heparin) Tube     Status: None   Result Value Ref Range    Hold Specimen Hold    RBC and Platelet Morphology     Status: None   Result Value Ref Range    Platelet Assessment  Automated Count Confirmed. Platelet morphology is normal.     Automated Count Confirmed. Platelet morphology is normal.    RBC Morphology Confirmed RBC Indices    CBC with platelets differential     Status: None    Narrative    The following orders were created for panel order CBC with platelets differential.  Procedure                               Abnormality         Status                     ---------                               -----------         ------                     CBC with platelets and d...[[id number]]                      Final result               RBC and Platelet Morphology[[id number]]                      Final result                 Please view results for these tests on the individual orders.             Medical/Surgical History:  Past Medical History:   Diagnosis Date     Personal history of other medical treatment (CODE)     No Comments Provided     Past Surgical History:   Procedure Laterality Date     COLONOSCOPY N/A 11/02/2021    1 small tubular adenoma, follow up 11/2/2026     CYSTOSCOPY, URETEROSCOPY, COMBINED  Right 04/06/2021    Procedure: Cystoscopy and right retrograde pyelogram and diagnostic ureteroscopy;  Surgeon: Trevon Montalvo MD;  Location:  OR       Medications:  Current Facility-Administered Medications   Medication     0.9% sodium chloride BOLUS     fentaNYL (PF) (SUBLIMAZE) injection 50 mcg     HYDROmorphone (DILAUDID) injection 1 mg     ondansetron (ZOFRAN) injection 4 mg     Current Outpatient Medications   Medication     ferrous gluconate (FERGON) 324 (38 Fe) MG tablet     nicotine (NICODERM CQ) 21 MG/24HR 24 hr patch     nicotine (NICORETTE) 4 MG lozenge       Allergies:  Patient has no known allergies.    Relevant labs, images, EKGs, Epic and outside hospital (if applicable) charts were reviewed. The findings, diagnosis, plan, and need for follow up were discussed with the patient/family. Nursing notes were reviewed.      Chris Paez MD  11/07/22 1958

## 2022-11-08 NOTE — ED NOTES
Patient resting in bed. Denies pain. No nausea. Will continue to monitor and intervene as needed.

## 2022-11-08 NOTE — PLAN OF CARE
Patient is pleasant. Vitals are stable. Patient's NG is functioning but tends to block up. Irrigating the NG with 60-180ml of free water has helped improve the NG's function. Patient has been using the call light when he thinks the NG is clogged. Patient was passing flatus while the nurse was at bedside. No new concerns at this time.      Goal Outcome Evaluation:      Plan of Care Reviewed With: patient    Overall Patient Progress: no changeOverall Patient Progress: no change

## 2022-11-08 NOTE — H&P
"[facility]    History and Physical - Hospitalist Service       Date of Admission:  11/7/2022    Assessment & Plan      Julius Hansne is a 62 year old male admitted on 11/7/2022. He presented to the emergency department with at least 4 days of abdominal pain and 2 days without any bowel movement.  He was found to have high-grade small bowel obstruction near site of right nephrectomy earlier this year.    Principal Problem:    Small bowel obstruction (H)    Assessment: No history of previous bowel obstructions.  NG has been placed and we have suctioned out copious amounts of thick gastric secretions.  Symptoms have improved significantly.    Plan: Continue with conservative measures.  IV fluids at 100 mL/h.  Manage pain with IV Dilaudid.  Okay to take in small sips of water or ice chips orally.    Active Problems:    Stage 3a chronic kidney disease (H)    Assessment: Patient with creatinine 1.59 which is essentially at baseline.    Plan: Monitor, recheck in a.m.       Diet: NPO for Medical/Clinical Reasons Except for: Ice Chips, Meds    DVT Prophylaxis: Pneumatic Compression Devices  Lewis Catheter: Not present  Central Lines: None  Cardiac Monitoring: None  Code Status: Full Code      Clinically Significant Risk Factors Present on Admission         # Hyponatremia: Lowest Na = 134 mmol/L (Ref range: 136-145) in last 2 days, will monitor as appropriate              # Overweight: Estimated body mass index is 29.23 kg/m  as calculated from the following:    Height as of this encounter: 1.778 m (5' 10\").    Weight as of this encounter: 92.4 kg (203 lb 11.3 oz).           Disposition Plan      Expected Discharge Date: 11/10/2022                The patient's care was discussed with the Patient.    Frank Rush MD  Hospitalist Service  [facility]  Securely message with the Vocera Web Console (learn more here)  Text page via [employer] Paging/Directory "         ______________________________________________________________________    Chief Complaint   Abdominal pain    History is obtained from the patient    History of Present Illness   Julius Hansen is a 62 year old male who presents to the emergency department with 4-day history of abdominal pain.  Reports pain may have started a little bit before that actually now that he thinks of it.  It was made worse with any oral intake.  Pain was initially in lower abdomen but eventually diffuse throughout abdomen.  He noticed some distention.  Has not had a bowel movement now for a little over 2 days.  Since coming to the medical floor has passed gas a couple of times.  Patient has never had a bowel obstruction in the past but did have right nephrectomy March 1, 2022 due to history of urothelial carcinoma.    Patient denies any chest pain or shortness of breath.  He has not had any anginal equivalents.  He does smoke, has nicotine patch on.    He tells me he has been urinating normally.  No fevers or chills.    Review of Systems    CONSTITUTIONAL: NEGATIVE for fever, chills, change in weight  INTEGUMENTARY/SKIN: NEGATIVE for worrisome rashes, moles or lesions  EYES: NEGATIVE for vision changes or irritation  ENT/MOUTH: NEGATIVE for ear, mouth and throat problems  RESP: NEGATIVE for significant cough or SOB  CV: NEGATIVE for chest pain, palpitations or peripheral edema  GI: NEGATIVE for nausea, abdominal pain, heartburn, or change in bowel habits  : NEGATIVE for frequency, dysuria, or hematuria  MUSCULOSKELETAL: NEGATIVE for significant arthralgias or myalgia  NEURO: NEGATIVE for weakness, dizziness or paresthesias  ENDOCRINE: NEGATIVE for temperature intolerance, skin/hair changes  HEME: NEGATIVE for bleeding problems  PSYCHIATRIC: NEGATIVE for changes in mood or affect    Past Medical History    I have reviewed this patient's medical history and updated it with pertinent information if needed.   Past Medical  "History:   Diagnosis Date     Personal history of other medical treatment (CODE)     No Comments Provided       Past Surgical History   I have reviewed this patient's surgical history and updated it with pertinent information if needed.  Past Surgical History:   Procedure Laterality Date     COLONOSCOPY N/A 11/02/2021    1 small tubular adenoma, follow up 11/2/2026     CYSTOSCOPY, URETEROSCOPY, COMBINED Right 04/06/2021    Procedure: Cystoscopy and right retrograde pyelogram and diagnostic ureteroscopy;  Surgeon: Trevon Montalvo MD;  Location: [facility] OR     EGD  07/08/2022     NEPHRECTOMY Right 03/01/2022    urothelial carcinoma     TRANSURETHRAL RESECTION OF BLADDER N/A 08/17/2022       Social History   I have reviewed this patient's social history and updated it with pertinent information if needed.  Social History     Tobacco Use     Smoking status: Every Day     Packs/day: 1.00     Years: 20.00     Pack years: 20.00     Types: Cigarettes     Smokeless tobacco: Never   Vaping Use     Vaping Use: Never used   Substance Use Topics     Alcohol use: Yes     Comment: \"every night I have a couple after work\"     Drug use: Yes     Types: Marijuana       Family History   I have reviewed this patient's family history and updated it with pertinent information if needed.  Family History   Problem Relation Age of Onset     No Known Problems Mother      Hypertension Father      Hyperlipidemia Father      Dementia Father      Lung Cancer Sister      No Known Problems Sister      No Known Problems Brother        Prior to Admission Medications   Prior to Admission Medications   Prescriptions Last Dose Informant Patient Reported? Taking?   calcium carbonate (OS-SANDY) 1500 (600 Ca) MG tablet 11/7/2022 at AM Self Yes Yes   Sig: Take 600 mg by mouth daily   multivitamin w/minerals (THERA-VIT-M) tablet 11/7/2022 at AM Self Yes Yes   Sig: Take 1 tablet by mouth daily      Facility-Administered Medications: None     Allergies   No Known " Allergies    Physical Exam   Vital Signs: Temp: 99.6  F (37.6  C) Temp src: Tympanic BP: 134/82 Pulse: 95   Resp: 22 SpO2: 95 % O2 Device: None (Room air)    Weight: 203 lbs 11.28 oz    Constitutional: awake, alert, cooperative, no apparent distress, and appears stated age  Eyes: Lids and lashes normal, pupils equal, round and reactive to light, extra ocular muscles intact, sclera clear, conjunctiva normal  ENT: Normocephalic, without obvious abnormality, atraumatic, sinuses nontender on palpation, external ears without lesions, oral pharynx with moist mucous membranes  Hematologic / Lymphatic: No lymphadenopathy  Respiratory: Clear to auscultation bilaterally  Cardiovascular: Regular rate and rhythm.  No murmurs rubs or gallops.  GI: Abdomen soft, he has mild tenderness more notably in lower abdomen.  No rebound or guarding.  Bowel sounds are heard but are high pitched  Genitounirinary: No CVA tenderness  Skin: No abnormal rashes or lesions  Musculoskeletal: No synovitis.  Muscle strength age and body habitus appropriateas well as equal and even.   Neurologic: Awake, alert, oriented to name, place and time.  Cranial nerves II-XII are grossly intact.  Motor is 5 out of 5 bilaterally.  Cerebellar function intact  Neuropsychiatric: General: normal, calm and normal eye contact  Orientation: oriented to self, place, time and situation  Memory and insight: memory for past and recent events intact and thought process normal    Data   Data reviewed today: I reviewed all medications, new labs and imaging results over the last 24 hours. I personally reviewed the abdominal CT image(s) showing High-grade small bowel obstruction with transition point in right paracolic gutter.    Recent Labs   Lab 11/08/22  0435 11/07/22  2120   WBC 4.6 4.2   HGB 16.3 16.8   MCV 89 87    277    134   POTASSIUM 4.6 4.0   CHLORIDE 99 99   CO2 30 26   BUN 26* 27*   CR 1.59* 1.66*   ANIONGAP 6 9   SANDY 9.7 9.9   * 111*   ALBUMIN  4.3 4.3   PROTTOTAL 7.3 7.9   BILITOTAL 0.8 0.8   ALKPHOS 82 82   ALT 23 25   AST 18 18   LIPASE  --  12     Recent Results (from the past 24 hour(s))   CT Abdomen Pelvis w/o & w Contrast    Narrative    PROCEDURE INFORMATION:   Exam: CT Abdomen Without And With Contrast   Exam date and time: 11/7/2022 10:41 PM   Age: 62 years old   Clinical indication: Other: Pain and distention, suspect bowel obstruction     TECHNIQUE:   Imaging protocol: Computed tomography of the abdomen without and with contrast.   Radiation optimization: All CT scans at this facility use at least one of these   dose optimization techniques: automated exposure control; mA and/or kV   adjustment per patient size (includes targeted exams where dose is matched to   clinical indication); or iterative reconstruction.   Contrast material: ; Contrast volume: 100 ml; Contrast route:   INTRAVENOUS (IV);      COMPARISON:   CT ABDOMEN PELVIS W CONTRAST 3/18/2021 5:07 AM     FINDINGS:   Lungs: Subsegmental atelectasis in both lung bases.     Liver: Fatty infiltration of the liver.   Gallbladder and bile ducts: Sludge in the dependent portion of the nondistended   gallbladder.   Pancreas: Mild atrophy of the pancreas.   Spleen: Normal. No splenomegaly.   Adrenal glands: Normal. No mass.   Kidneys and ureters: Postsurgical changes of right nephrectomy. The left kidney   demonstrates no obstructive uropathy. There is mild left perinephric fat   stranding.   Stomach and bowel: There are few scattered colonic diverticula without evidence   of acute inflammation. Dilated fluid-filled loops of mid and distal small bowel   loops with air-fluid levels. No wall thickening or pneumatosis. No closed-loop   obstruction. The transition point appears to be in the left anterior abdomen.   Appendix: No appendicitis.     Intraperitoneal space: Unremarkable. No free air. No significant fluid   collection.   Vasculature: Atherosclerotic changes of the abdominal aorta  without aneurysm.   Lymph nodes: Unremarkable. No enlarged lymph nodes.   Bones/joints: Severe multilevel degenerative changes are seen of the   thoracolumbar spine.. No acute fracture. No dislocation.   Soft tissues: Unremarkable.       Impression    IMPRESSION:   1. High-grade small bowel obstruction of the mid and distal small bowel loops,   with a transition point in the left anterior hemiabdomen. No wall thickening or   pneumatosis.   2. Hepatic steatosis.   3. Gallbladder sludge.   4. Status post right nephrectomy.   5. Mild left perinephric fat stranding.   6. Colonic diverticulosis.     THIS DOCUMENT HAS BEEN ELECTRONICALLY SIGNED BY ALAN TAN DO   XR Chest Port 1 View    Narrative    PROCEDURE INFORMATION:   Exam: XR Chest   Exam date and time: 11/7/2022 11:25 PM   Age: 62 years old   Clinical indication: Device placement; Ng tube; Additional info: Ng tube   verification     TECHNIQUE:   Imaging protocol: Radiologic exam of the chest.   Views: 1 view.     COMPARISON:   CT ABDOMEN PELVIS W/O W CONTRAST 11/7/2022 10:41 PM     FINDINGS:   Tubes, catheters and devices: Tip of the nasogastric tube is at the level of   the proximal stomach.     Lungs: Unremarkable. No consolidation.   Pleural spaces: Unremarkable. No pleural effusion. No pneumothorax.   Heart/Mediastinum: Unremarkable. No cardiomegaly.   Bones/joints: Unremarkable.     Gastrointestinal tract: Prominent gaseous distension of small bowel loops.       Impression    IMPRESSION:   1. Tip of nasogastric tube at the level of the proximal stomach.   2. Prominent gaseous distention of small bowel loops.     THIS DOCUMENT HAS BEEN ELECTRONICALLY SIGNED BY ALAN TAN DO

## 2022-11-08 NOTE — CONSULTS
Surgical  Consult  Referring physician:  YUDY Singh  Primary physician:     Dorota Posada    Chief complaint:   Small bowel obstruction    History of present illness:  This is a 62 year old male I am seeing in consultation for a small bowel obstruction.  Patient began having abdominal pain on 11/4/2022 along with associated nausea and vomiting.  Last bowel movement was 11/6/2022.  Came to the emergency room last night and has had an NG tube placed.  Still complaining of nausea and hiccups.  NG was re-positioned and irrigated.  Total output 1700 mL.  CT scan was reviewed with Dr. Schafer and is consistent with a small bowel obstruction with likely transition in the right gutter.  Patient's only prior abdominal surgery was a robotic right nephrectomy in August 2022.     Past medical history:   Past Medical History:   Diagnosis Date     Personal history of other medical treatment (CODE)     No Comments Provided       Pastsurgical history:  Past Surgical History:   Procedure Laterality Date     COLONOSCOPY N/A 11/02/2021    1 small tubular adenoma, follow up 11/2/2026     CYSTOSCOPY, URETEROSCOPY, COMBINED Right 04/06/2021    Procedure: Cystoscopy and right retrograde pyelogram and diagnostic ureteroscopy;  Surgeon: Trevon Montalvo MD;  Location:  OR     EGD  07/08/2022     NEPHRECTOMY Right 03/01/2022    urothelial carcinoma     TRANSURETHRAL RESECTION OF BLADDER N/A 08/17/2022       Current medications:  Current Outpatient Medications   Medication Sig Dispense Refill     ferrous gluconate (FERGON) 324 (38 Fe) MG tablet Take 1 tablet (324 mg) by mouth daily (with breakfast) 90 tablet 3     nicotine (NICODERM CQ) 21 MG/24HR 24 hr patch Place 1 patch onto the skin every 24 hours 28 patch 4     nicotine (NICORETTE) 4 MG lozenge Place 1 lozenge (4 mg) inside cheek every hour as needed for smoking cessation 27 lozenge 11       Allergies:  No Known Allergies    Family history:  Family History   Problem Relation Age of  "Onset     No Known Problems Mother      Hypertension Father      Hyperlipidemia Father      Dementia Father      Lung Cancer Sister      No Known Problems Sister      No Known Problems Brother        Social history:  Social History     Socioeconomic History     Marital status:      Spouse name: Not on file     Number of children: Not on file     Years of education: Not on file     Highest education level: Not on file   Occupational History     Not on file   Tobacco Use     Smoking status: Every Day     Packs/day: 1.00     Years: 20.00     Pack years: 20.00     Types: Cigarettes     Smokeless tobacco: Never   Vaping Use     Vaping Use: Never used   Substance and Sexual Activity     Alcohol use: Yes     Comment: \"every night I have a couple after work\"     Drug use: Yes     Types: Marijuana     Sexual activity: Yes     Partners: Female     Birth control/protection: Surgical   Other Topics Concern     Not on file   Social History Narrative    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  Will be going to [facility] in 10/2013 to work for up to 2 years.     Social Determinants of Health     Financial Resource Strain: Not on file   Food Insecurity: Not on file   Transportation Needs: Not on file   Physical Activity: Not on file   Stress: Not on file   Social Connections: Not on file   Intimate Partner Violence: Not on file   Housing Stability: Not on file       PROBLEM LIST:  Patient Active Problem List   Diagnosis     Facial paralysis/[place] palsy     Marijuana use     Lyme disease     Tobacco dependence     Elevated serum creatinine     Elevated blood pressure reading without diagnosis of hypertension     Chronic kidney disease, stage 3     Small bowel obstruction (H)     Rosacea     Urothelial cancer (H)     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)     Diverticulosis of large intestine     H/O adenomatous polyp of colon     S/p nephrectomy right     Non-functioning kidney     Stage 3a chronic kidney disease " "(H)       Review of Systems:  COMPLETE REVIEW OF SYSTEMS:  Denies problems  Gastrointestinal:  See HPI  Cardiovascular:  Denies problems  Respiratory:  Denies problems  Genitourinary:  Due for follow-up cystoscopy for urothelial carcinoma of the bladder  Musculoskeletal:  Denies problems  Skin:  Denies problems  Neurologic:  Denies problems  Psychiatric:  Denies problems  Endocrine:  Denies problems  Heme/Lymphatic:  Denies problems  Vascular:  Denies problems    Physical exam: BP (!) 137/95   Pulse 72   Temp 98.6  F (37  C) (Tympanic)   Resp 26   Ht 1.778 m (5' 10\")   Wt 93 kg (205 lb)   SpO2 95%   BMI 29.41 kg/m      General: this is a pleasant male patient in no acute distress.  Patient is awake alert and oriented x3 .   EXAM:  Chest/Respiratory Exam: Normal - Clear to auscultation without rales, rhonchi, or wheezing.  Cardiovascular Exam: normal, regular rate and rhythm  Abdomen: Multiple laparoscopic scars plus upper abdominal scar just to the left of midline.  Distended.  Tympanic.  Bowel sounds with rushes and tinkles.  No tenderness to percussion or palpation.     Assessment:   Small bowel obstruction.  Discussed the significance of determining partial versus complete obstruction and the chances of responding to nonoperative or operative therapy.     Plan:    NG decompression  IV fluids  Gastrografin challenge overnight      Mirza Dumont MD        "

## 2022-11-08 NOTE — ED PROVIDER NOTES
11/07/22   11:00 PM     I am accepting the care of this patient from Dr Paez at change of shift.  Julius Hansen is a 61 yo male here with abdominal pain.  Labs show CBC normal, CMP with Cr 1.66 (stable), lipase normal, CRP 22, 4 Plex negative. CT is done and shows air fluid levels, formal report pending.  NG ordered.     ED Course    CT shows SBO.  I have added Dilaudid, fentanyl and Zofran PRN for him. I did complete his boarding orders- see worksheet.  We will need to keep him here in the ED for now. I will speak to surgery in the morning.      7:00 AM  I am signing out his care to Dr Guzmán.     Pending: if he is going to stay here we need to contact surgery     Diagnosis    (K56.609) Small bowel obstruction (H)      (N28.9) Non-functioning kidney      (N18.31) Stage 3a chronic kidney disease (H)      Plan: Per Dr Guzmán.              Jose Juan Thacker MD  11/09/22 7805

## 2022-11-08 NOTE — PROVIDER NOTIFICATION
11/08/22 1445   Valuables   Patient Belongings remains with patient   Patient Belongings Remaining with Patient clothing;cell phone/electronics;glasses   Did you bring any home meds/supplements to the hospital?  No   [facility] will make every effort per our policy to help keep your items safe while in the hospital.  If you choose to keep any items at the bedside, we cannot be held responsible for any items that are lost or broken.      List items sent to safe:     I have reviewed my belongings list on admission and verify that it is correct.     Patient signature_______________________________  Date/Time_____________________    2nd Staff person if patient unable to sign __________________________  Date/Time ______________________      I have received all my belongings noted above at discharge.    Patient signature________________________________  Date/Time  __________________________

## 2022-11-08 NOTE — PROGRESS NOTES
IV Contrast- Discharge Instructions After Your CT Scan      The IV contrast you received today will be filtered from your bloodstream by your kidneys during the next 24 hours and pass from the body in urine.  You will not be aware of this process and your urine will not change in color.  To help this process you should drink at least 4 additional glasses of water or juice today.  This reduces stress on your kidneys.    Most contrast reactions are immediate.  Should you develop symptoms of concern after discharge, contact the department at the number below.  After hours you should contact your personal physician.  If you develop breathing distress or wheezing, call 911.      1.  Has the patient had a previous reaction to IV contrast? NO    2.  Does the patient have kidney disease? YES    3.  Is the patient on dialysis? NO    If YES to any of these questions, exam will be reviewed with a Radiologist before administering contrast.

## 2022-11-08 NOTE — PHARMACY-ADMISSION MEDICATION HISTORY
Pharmacy -- Admission Medication Reconciliation    Prior to admission (PTA) medications were reviewed and the patient's PTA medication list was updated.    Sources Consulted: Patient interviewVignesh    The reliability of this Medication Reconciliation is: Reliability: Reliable    The following significant changes were made:  -Updated patient's preferred pharmacy (would like new Rx's sent to [facility])    -Added MV  -Added Calcium    -Removed Ferrous gluconate  -Removed Nicotine patch  -Removed Nicotine lozenge      **Notes:**  -Patient currently smoking- reports smoking ~1 PPD      In addition, the patient's allergies were reviewed with the patient and left as follows:   Allergies: Patient has no known allergies.    The pharmacist has reviewed with the patient that all personal medications should be removed from the building or locked in the belongings safe.  Patient shall only take medications ordered by the physician and administered by the nursing staff.       Medication barriers identified: none noted   Medication adherence concerns: none noted   Understanding of emergency medications: N/A    Levi Bartlett RP, 11/8/2022,  2:10 PM     
within normal limits

## 2022-11-09 LAB
ALBUMIN SERPL BCG-MCNC: 3.7 G/DL (ref 3.5–5.2)
ALP SERPL-CCNC: 97 U/L (ref 40–129)
ALT SERPL W P-5'-P-CCNC: 25 U/L (ref 10–50)
ANION GAP SERPL CALCULATED.3IONS-SCNC: 8 MMOL/L (ref 7–15)
AST SERPL W P-5'-P-CCNC: 19 U/L (ref 10–50)
BASOPHILS # BLD AUTO: 0 10E3/UL (ref 0–0.2)
BASOPHILS NFR BLD AUTO: 1 %
BILIRUB SERPL-MCNC: 0.9 MG/DL
BUN SERPL-MCNC: 23.3 MG/DL (ref 8–23)
CALCIUM SERPL-MCNC: 9.1 MG/DL (ref 8.8–10.2)
CHLORIDE SERPL-SCNC: 103 MMOL/L (ref 98–107)
CREAT SERPL-MCNC: 1.41 MG/DL (ref 0.67–1.17)
CRP SERPL HS-MCNC: 46.2 MG/L
DEPRECATED HCO3 PLAS-SCNC: 28 MMOL/L (ref 22–29)
EOSINOPHIL # BLD AUTO: 0.1 10E3/UL (ref 0–0.7)
EOSINOPHIL NFR BLD AUTO: 1 %
ERYTHROCYTE [DISTWIDTH] IN BLOOD BY AUTOMATED COUNT: 12.6 % (ref 10–15)
GFR SERPL CREATININE-BSD FRML MDRD: 56 ML/MIN/1.73M2
GLUCOSE SERPL-MCNC: 99 MG/DL (ref 70–99)
HCT VFR BLD AUTO: 44.4 % (ref 40–53)
HGB BLD-MCNC: 15.4 G/DL (ref 13.3–17.7)
IMM GRANULOCYTES # BLD: 0 10E3/UL
IMM GRANULOCYTES NFR BLD: 0 %
LYMPHOCYTES # BLD AUTO: 1.5 10E3/UL (ref 0.8–5.3)
LYMPHOCYTES NFR BLD AUTO: 32 %
MCH RBC QN AUTO: 30.7 PG (ref 26.5–33)
MCHC RBC AUTO-ENTMCNC: 34.7 G/DL (ref 31.5–36.5)
MCV RBC AUTO: 88 FL (ref 78–100)
MONOCYTES # BLD AUTO: 1 10E3/UL (ref 0–1.3)
MONOCYTES NFR BLD AUTO: 21 %
NEUTROPHILS # BLD AUTO: 2.1 10E3/UL (ref 1.6–8.3)
NEUTROPHILS NFR BLD AUTO: 45 %
NRBC # BLD AUTO: 0 10E3/UL
NRBC BLD AUTO-RTO: 0 /100
PLATELET # BLD AUTO: 245 10E3/UL (ref 150–450)
POTASSIUM SERPL-SCNC: 4.4 MMOL/L (ref 3.4–5.3)
PROT SERPL-MCNC: 6.6 G/DL (ref 6.4–8.3)
RBC # BLD AUTO: 5.02 10E6/UL (ref 4.4–5.9)
SODIUM SERPL-SCNC: 139 MMOL/L (ref 136–145)
WBC # BLD AUTO: 4.6 10E3/UL (ref 4–11)

## 2022-11-09 PROCEDURE — 80053 COMPREHEN METABOLIC PANEL: CPT | Performed by: FAMILY MEDICINE

## 2022-11-09 PROCEDURE — 99232 SBSQ HOSP IP/OBS MODERATE 35: CPT | Performed by: FAMILY MEDICINE

## 2022-11-09 PROCEDURE — 258N000003 HC RX IP 258 OP 636: Performed by: FAMILY MEDICINE

## 2022-11-09 PROCEDURE — 86141 C-REACTIVE PROTEIN HS: CPT | Performed by: FAMILY MEDICINE

## 2022-11-09 PROCEDURE — 36415 COLL VENOUS BLD VENIPUNCTURE: CPT | Performed by: FAMILY MEDICINE

## 2022-11-09 PROCEDURE — 250N000011 HC RX IP 250 OP 636: Performed by: FAMILY MEDICINE

## 2022-11-09 PROCEDURE — 85025 COMPLETE CBC W/AUTO DIFF WBC: CPT | Performed by: FAMILY MEDICINE

## 2022-11-09 PROCEDURE — 99231 SBSQ HOSP IP/OBS SF/LOW 25: CPT | Mod: 25 | Performed by: SURGERY

## 2022-11-09 PROCEDURE — 120N000001 HC R&B MED SURG/OB

## 2022-11-09 RX ADMIN — HYDROMORPHONE HYDROCHLORIDE 0.2 MG: 0.2 INJECTION, SOLUTION INTRAMUSCULAR; INTRAVENOUS; SUBCUTANEOUS at 12:12

## 2022-11-09 RX ADMIN — HYDROMORPHONE HYDROCHLORIDE 0.2 MG: 0.2 INJECTION, SOLUTION INTRAMUSCULAR; INTRAVENOUS; SUBCUTANEOUS at 18:04

## 2022-11-09 RX ADMIN — Medication 1 ML: at 05:23

## 2022-11-09 RX ADMIN — SODIUM CHLORIDE: 9 INJECTION, SOLUTION INTRAVENOUS at 06:34

## 2022-11-09 RX ADMIN — FAMOTIDINE 20 MG: 10 INJECTION INTRAVENOUS at 22:21

## 2022-11-09 RX ADMIN — HYDROMORPHONE HYDROCHLORIDE 0.2 MG: 0.2 INJECTION, SOLUTION INTRAMUSCULAR; INTRAVENOUS; SUBCUTANEOUS at 22:41

## 2022-11-09 RX ADMIN — FAMOTIDINE 20 MG: 10 INJECTION INTRAVENOUS at 09:41

## 2022-11-09 ASSESSMENT — ACTIVITIES OF DAILY LIVING (ADL)
ADLS_ACUITY_SCORE: 22
ADLS_ACUITY_SCORE: 22
ADLS_ACUITY_SCORE: 24
ADLS_ACUITY_SCORE: 22
ADLS_ACUITY_SCORE: 24
ADLS_ACUITY_SCORE: 22
ADLS_ACUITY_SCORE: 22
ADLS_ACUITY_SCORE: 24
ADLS_ACUITY_SCORE: 22

## 2022-11-09 NOTE — PROGRESS NOTES
"Surgical Progress Note    HD# 2  SBO    Subjective: Passed some flatus. Pain nearly gone unless lays on side.    Objective:  /84   Pulse 83   Temp 98.6  F (37  C)   Resp 18   Ht 1.778 m (5' 10\")   Wt 89 kg (196 lb 4.8 oz)   SpO2 95%   BMI 28.17 kg/m      N mL last shift      ABDOMEN: Much less distended. Soft and non-tender.    LABS  Recent Labs   Lab Test 22  0530 22  0435 21  0505 17  1500 17  0759   WBC 4.6 4.6   < >  --   --    RBC 5.02 5.38   < >  --   --    HGB 15.4 16.3   < > 14.0 14.6   HCT 44.4 47.7   < > 41.1 42.8   MCV 88 89   < > 85 85   MCH 30.7 30.3   < > 28.8 29.0   MCHC 34.7 34.2   < > 34.1 34.1    250   < > 226 247   MPV  --   --   --  8.2 8.4    < > = values in this interval not displayed.       Recent Labs   Lab Test 22   POTASSIUM 4.4 4.6   CHLORIDE 103 99   BUN 23.3* 26*       Recent Labs   Lab Test 22  0530 22  0435 21  1635 21  1622 21  1611 21  0637   PROTEIN  --   --   --  30*  --  300*   BILITOTAL 0.9 0.8   < >  --    < >  --    AST 19 18   < >  --    < >  --    ALT 25 23   < >  --    < >  --     < > = values in this interval not displayed.           ASSESSMENT  Small bowel obstruction doing better so likely partial. No gastrografin available for test.      PLAN  Continue NGT      Mirza Dumont MD      "

## 2022-11-09 NOTE — PROGRESS NOTES
"[facility]    Medicine Progress Note - Hospitalist Service    Date of Admission:  11/7/2022    Assessment & Plan      Julius Hansen is a 62 year old male admitted on 11/7/2022. He presented to the emergency department with at least 4 days of abdominal pain and 2 days without any bowel movement.  He was found to have high-grade small bowel obstruction near site of right nephrectomy earlier this year.    Patient stable overnight.  Continues to have significant NG output.  He is passing flatus and bowel sounds are more normal this morning.    Principal Problem:    Small bowel obstruction (H)    Assessment: No history of previous bowel obstructions.  NG continues to produce thick output.  Symptoms significantly improved.    Plan: Continue with conservative measures.  IV fluids at 100 mL/h.  Manage pain with IV Dilaudid.  Okay to take in small sips of water or ice chips orally.    Active Problems:    Stage 3a chronic kidney disease (H)    Assessment: Patient with creatinine 1.41 which is essentially at baseline.    Plan: Monitor, recheck in a.m.         Diet: NPO for Medical/Clinical Reasons Except for: Ice Chips, Meds    DVT Prophylaxis: Pneumatic Compression Devices  Lewis Catheter: Not present  Central Lines: None  Cardiac Monitoring: None  Code Status: Full Code      Disposition Plan     Expected Discharge Date: 11/10/2022                The patient's care was discussed with the Patient.    Frank Rush MD  Hospitalist Service  [facility]  Securely message with the Vocera Web Console (learn more here)  Text page via [facility] Paging/Directory         Clinically Significant Risk Factors Present on Admission         # Hyponatremia: Lowest Na = 134 mmol/L (Ref range: 136-145) in last 2 days, will monitor as appropriate              # Overweight: Estimated body mass index is 28.17 kg/m  as calculated from the following:    Height as of this encounter: 1.778 m (5' 10\").    " Weight as of this encounter: 89 kg (196 lb 4.8 oz).           ______________________________________________________________________    Interval History   Patient reports pain has improved significantly from admission.  No change in quality.  Nausea better and no vomiting.  Still however having quite a bit of output from the NG tube.  Patient reports passing gas frequently.  No stool.    No fevers or chills.  No chest pain or shortness of breath.  No other new issues overnight.    Data reviewed today: I reviewed all medications, new labs and imaging results over the last 24 hours. I personally reviewed no images or EKG's today.    Physical Exam   Vital Signs: Temp: 97.7  F (36.5  C) Temp src: Tympanic BP: 135/87 Pulse: 73   Resp: 18 SpO2: 95 % O2 Device: None (Room air)    Weight: 196 lbs 4.8 oz  Constitutional: awake, alert, cooperative, no apparent distress, and appears stated age  Respiratory: No increased work of breathing, good air exchange, clear to auscultation bilaterally, no crackles or wheezing  Cardiovascular: Regularrate and rhythm.  No murmurs rubs or gallops heard.   GI: Abdomen Soft, non-tender.  No masses, rebound or guarding.  Bowel sounds heard today in all quadrants and normal in quality  Neuropsychiatric: Alert and oriented x3.    Data   Recent Labs   Lab 11/09/22  0530 11/08/22  0435 11/07/22  2120   WBC 4.6 4.6 4.2   HGB 15.4 16.3 16.8   MCV 88 89 87    250 277    135 134   POTASSIUM 4.4 4.6 4.0   CHLORIDE 103 99 99   CO2 28 30 26   BUN 23.3* 26* 27*   CR 1.41* 1.59* 1.66*   ANIONGAP 8 6 9   SANDY 9.1 9.7 9.9   GLC 99 109* 111*   ALBUMIN 3.7 4.3 4.3   PROTTOTAL 6.6 7.3 7.9   BILITOTAL 0.9 0.8 0.8   ALKPHOS 97 82 82   ALT 25 23 25   AST 19 18 18   LIPASE  --   --  12     No results found for this or any previous visit (from the past 24 hour(s)).

## 2022-11-09 NOTE — PLAN OF CARE
"Patient admitted for SBO. VSS. Afebrile. Room air. Denies Abd pain. Sore throat with spray provided.  Denies nausea. NG in place and patent. Patient was not tolerating low continuous so placed on low intermediate suction. Up with SBA, using bedside urinal.       Problem: Plan of Care - These are the overarching goals to be used throughout the patient stay.    Goal: Plan of Care Review  Description: The Plan of Care Review/Shift note should be completed every shift.  The Outcome Evaluation is a brief statement about your assessment that the patient is improving, declining, or no change.  This information will be displayed automatically on your shift note.  Outcome: Progressing  Goal: Patient-Specific Goal (Individualized)  Description: You can add care plan individualizations to a care plan. Examples of Individualization might be:  \"Parent requests to be called daily at 9am for status\", \"I have a hard time hearing out of my right ear\", or \"Do not touch me to wake me up as it startles me\".  Outcome: Progressing  Goal: Absence of Hospital-Acquired Illness or Injury  Outcome: Progressing  Intervention: Identify and Manage Fall Risk  Recent Flowsheet Documentation  Taken 11/8/2022 2124 by Kathleen Hendricks RN  Safety Promotion/Fall Prevention:   activity supervised   assistive device/personal items within reach   bed alarm on   clutter free environment maintained   fall prevention program maintained   nonskid shoes/slippers when out of bed   patient and family education   supervised activity   treat reversible contributory factors   treat underlying cause  Intervention: Prevent Skin Injury  Recent Flowsheet Documentation  Taken 11/8/2022 2124 by Kathleen Hendricks RN  Body Position: position changed independently  Intervention: Prevent and Manage VTE (Venous Thromboembolism) Risk  Recent Flowsheet Documentation  Taken 11/8/2022 2124 by Kathleen Hendricks RN  VTE Prevention/Management:   SCDs (sequential " compression devices) off   patient refused intervention  Goal: Optimal Comfort and Wellbeing  Outcome: Progressing  Goal: Readiness for Transition of Care  Outcome: Progressing     Problem: Intestinal Obstruction  Goal: Optimal Bowel Function  Outcome: Progressing  Intervention: Promote Bowel Function  Recent Flowsheet Documentation  Taken 11/8/2022 2124 by Kathleen Hendricks RN  Body Position: position changed independently  Goal: Fluid and Electrolyte Balance  Outcome: Progressing  Goal: Absence of Infection Signs and Symptoms  Outcome: Progressing  Goal: Optimize Nutrition Status  Outcome: Progressing  Goal: Optimal Pain Control and Function  Outcome: Progressing     Problem: Nausea and Vomiting  Goal: Nausea and Vomiting Relief  Outcome: Progressing  Intervention: Prevent and Manage Nausea and Vomiting  Recent Flowsheet Documentation  Taken 11/8/2022 2124 by Kathleen Hendricks RN  Nausea/Vomiting Interventions: nasogastric tube to suction     Problem: Pain Acute  Goal: Optimal Pain Control and Function  Outcome: Progressing  Intervention: Prevent or Manage Pain  Recent Flowsheet Documentation  Taken 11/8/2022 2124 by Kathleen Hendricks RN  Medication Review/Management: medications reviewed   Goal Outcome Evaluation:

## 2022-11-09 NOTE — PROGRESS NOTES
SAFETY CHECKLIST  ID Bands and Risk clasps correct and in place (DNR, Fall risk, Allergy, Latex, Limb):  Yes  All Lines Reconciled and labeled correctly: Yes  Whiteboard updated:Yes  Environmental interventions: Yes - call light, bed low, bedrails x 2, items within reach, bed alarm.   Verify Tele #: NA

## 2022-11-09 NOTE — PLAN OF CARE
Problem: Plan of Care - These are the overarching goals to be used throughout the patient stay.    Goal: Absence of Hospital-Acquired Illness or Injury  Intervention: Identify and Manage Fall Risk  Recent Flowsheet Documentation  Taken 11/9/2022 0807 by Yazmin Ron RN  Safety Promotion/Fall Prevention:   activity supervised   assistive device/personal items within reach   bed alarm on   safety round/check completed  Intervention: Prevent and Manage VTE (Venous Thromboembolism) Risk  Recent Flowsheet Documentation  Taken 11/9/2022 0807 by Yazmin Ron RN  VTE Prevention/Management:   SCDs (sequential compression devices) off   patient refused intervention  Goal: Optimal Comfort and Wellbeing  Intervention: Monitor Pain and Promote Comfort  Recent Flowsheet Documentation  Taken 11/9/2022 0807 by Yazmin Ron RN  Pain Management Interventions: declines     Problem: Intestinal Obstruction  Goal: Optimal Pain Control and Function  Intervention: Prevent or Manage Pain  Recent Flowsheet Documentation  Taken 11/9/2022 0807 by Yazmin Ron RN  Pain Management Interventions: declines     Problem: Pain Acute  Goal: Optimal Pain Control and Function  Intervention: Develop Pain Management Plan  Recent Flowsheet Documentation  Taken 11/9/2022 0807 by Yazmin Ron RN  Pain Management Interventions: declines  Intervention: Prevent or Manage Pain  Recent Flowsheet Documentation  Taken 11/9/2022 0807 by Yazmin Ron RN  Medication Review/Management: medications reviewed   Goal Outcome Evaluation:      Pt denies nausea and is tolerating ice chips.  C/O lower abd pain rating a 7/10 this AM and declined any intervention.  At 1200 pt requested something for pain, stating it was worse than this AM.  NG draining clear, brown fluid that is thin in consistency.  Resting in bed, wife at bedside.

## 2022-11-10 ENCOUNTER — ANESTHESIA EVENT (OUTPATIENT)
Dept: SURGERY | Facility: OTHER | Age: 62
End: 2022-11-10
Payer: COMMERCIAL

## 2022-11-10 ENCOUNTER — ANESTHESIA (OUTPATIENT)
Dept: SURGERY | Facility: OTHER | Age: 62
End: 2022-11-10
Payer: COMMERCIAL

## 2022-11-10 PROBLEM — N18.31 CHRONIC KIDNEY DISEASE (CKD) STAGE G3A/A1, MODERATELY DECREASED GLOMERULAR FILTRATION RATE (GFR) BETWEEN 45-59 ML/MIN/1.73 SQUARE METER AND ALBUMINURIA CREATININE RATIO LESS THAN 30 MG/G (H): Status: ACTIVE | Noted: 2022-11-10

## 2022-11-10 PROBLEM — N28.9 NON-FUNCTIONING KIDNEY: Status: ACTIVE | Noted: 2022-11-10

## 2022-11-10 PROBLEM — N28.9: Status: ACTIVE | Noted: 2022-11-10

## 2022-11-10 PROBLEM — Z11.52 ENCOUNTER FOR SCREENING LABORATORY TESTING FOR COVID-19 VIRUS: Status: ACTIVE | Noted: 2022-11-10

## 2022-11-10 PROBLEM — Z98.890 POSTOPERATIVE STATE: Status: ACTIVE | Noted: 2022-11-10

## 2022-11-10 LAB
ALBUMIN SERPL BCG-MCNC: 3.5 G/DL (ref 3.5–5.2)
ALP SERPL-CCNC: 92 U/L (ref 40–129)
ALT SERPL W P-5'-P-CCNC: 20 U/L (ref 10–50)
ANION GAP SERPL CALCULATED.3IONS-SCNC: 11 MMOL/L (ref 7–15)
AST SERPL W P-5'-P-CCNC: 18 U/L (ref 10–50)
BASOPHILS # BLD AUTO: 0 10E3/UL (ref 0–0.2)
BASOPHILS NFR BLD AUTO: 1 %
BILIRUB SERPL-MCNC: 0.7 MG/DL
BUN SERPL-MCNC: 19.1 MG/DL (ref 8–23)
CALCIUM SERPL-MCNC: 8.9 MG/DL (ref 8.8–10.2)
CHLORIDE SERPL-SCNC: 104 MMOL/L (ref 98–107)
CREAT SERPL-MCNC: 1.3 MG/DL (ref 0.67–1.17)
CRP SERPL HS-MCNC: 46 MG/L
DEPRECATED HCO3 PLAS-SCNC: 25 MMOL/L (ref 22–29)
EOSINOPHIL # BLD AUTO: 0.1 10E3/UL (ref 0–0.7)
EOSINOPHIL NFR BLD AUTO: 1 %
ERYTHROCYTE [DISTWIDTH] IN BLOOD BY AUTOMATED COUNT: 12.4 % (ref 10–15)
GFR SERPL CREATININE-BSD FRML MDRD: 62 ML/MIN/1.73M2
GLUCOSE BLDC GLUCOMTR-MCNC: 75 MG/DL (ref 70–99)
GLUCOSE SERPL-MCNC: 78 MG/DL (ref 70–99)
HCT VFR BLD AUTO: 40.1 % (ref 40–53)
HGB BLD-MCNC: 13.7 G/DL (ref 13.3–17.7)
HOLD SPECIMEN: NORMAL
IMM GRANULOCYTES # BLD: 0 10E3/UL
IMM GRANULOCYTES NFR BLD: 1 %
LYMPHOCYTES # BLD AUTO: 1.7 10E3/UL (ref 0.8–5.3)
LYMPHOCYTES NFR BLD AUTO: 27 %
MCH RBC QN AUTO: 30.4 PG (ref 26.5–33)
MCHC RBC AUTO-ENTMCNC: 34.2 G/DL (ref 31.5–36.5)
MCV RBC AUTO: 89 FL (ref 78–100)
MONOCYTES # BLD AUTO: 1 10E3/UL (ref 0–1.3)
MONOCYTES NFR BLD AUTO: 16 %
NEUTROPHILS # BLD AUTO: 3.4 10E3/UL (ref 1.6–8.3)
NEUTROPHILS NFR BLD AUTO: 54 %
NRBC # BLD AUTO: 0 10E3/UL
NRBC BLD AUTO-RTO: 0 /100
PLATELET # BLD AUTO: 232 10E3/UL (ref 150–450)
POTASSIUM SERPL-SCNC: 4 MMOL/L (ref 3.4–5.3)
PROT SERPL-MCNC: 6.1 G/DL (ref 6.4–8.3)
RBC # BLD AUTO: 4.5 10E6/UL (ref 4.4–5.9)
SODIUM SERPL-SCNC: 140 MMOL/L (ref 136–145)
WBC # BLD AUTO: 6.2 10E3/UL (ref 4–11)

## 2022-11-10 PROCEDURE — 250N000011 HC RX IP 250 OP 636: Performed by: NURSE ANESTHETIST, CERTIFIED REGISTERED

## 2022-11-10 PROCEDURE — 0JB80ZX EXCISION OF ABDOMEN SUBCUTANEOUS TISSUE AND FASCIA, OPEN APPROACH, DIAGNOSTIC: ICD-10-PCS | Performed by: SURGERY

## 2022-11-10 PROCEDURE — 250N000011 HC RX IP 250 OP 636: Performed by: FAMILY MEDICINE

## 2022-11-10 PROCEDURE — 88342 IMHCHEM/IMCYTCHM 1ST ANTB: CPT

## 2022-11-10 PROCEDURE — 86141 C-REACTIVE PROTEIN HS: CPT | Performed by: FAMILY MEDICINE

## 2022-11-10 PROCEDURE — 36415 COLL VENOUS BLD VENIPUNCTURE: CPT | Performed by: FAMILY MEDICINE

## 2022-11-10 PROCEDURE — 272N000001 HC OR GENERAL SUPPLY STERILE: Performed by: SURGERY

## 2022-11-10 PROCEDURE — 85004 AUTOMATED DIFF WBC COUNT: CPT | Performed by: FAMILY MEDICINE

## 2022-11-10 PROCEDURE — 272N000002 HC OR SUPPLY OTHER OPNP: Performed by: SURGERY

## 2022-11-10 PROCEDURE — 44140 PARTIAL REMOVAL OF COLON: CPT | Performed by: SURGERY

## 2022-11-10 PROCEDURE — 44140 PARTIAL REMOVAL OF COLON: CPT | Performed by: NURSE ANESTHETIST, CERTIFIED REGISTERED

## 2022-11-10 PROCEDURE — 250N000011 HC RX IP 250 OP 636: Performed by: SURGERY

## 2022-11-10 PROCEDURE — 99232 SBSQ HOSP IP/OBS MODERATE 35: CPT | Mod: 25 | Performed by: SURGERY

## 2022-11-10 PROCEDURE — 258N000003 HC RX IP 258 OP 636: Performed by: FAMILY MEDICINE

## 2022-11-10 PROCEDURE — 250N000009 HC RX 250: Performed by: NURSE ANESTHETIST, CERTIFIED REGISTERED

## 2022-11-10 PROCEDURE — 258N000003 HC RX IP 258 OP 636: Performed by: NURSE ANESTHETIST, CERTIFIED REGISTERED

## 2022-11-10 PROCEDURE — 99232 SBSQ HOSP IP/OBS MODERATE 35: CPT | Performed by: FAMILY MEDICINE

## 2022-11-10 PROCEDURE — 250N000009 HC RX 250: Performed by: SURGERY

## 2022-11-10 PROCEDURE — 88341 IMHCHEM/IMCYTCHM EA ADD ANTB: CPT

## 2022-11-10 PROCEDURE — 710N000010 HC RECOVERY PHASE 1, LEVEL 2, PER MIN: Performed by: SURGERY

## 2022-11-10 PROCEDURE — 0DB80ZX EXCISION OF SMALL INTESTINE, OPEN APPROACH, DIAGNOSTIC: ICD-10-PCS | Performed by: SURGERY

## 2022-11-10 PROCEDURE — 80053 COMPREHEN METABOLIC PANEL: CPT | Performed by: FAMILY MEDICINE

## 2022-11-10 PROCEDURE — 88305 TISSUE EXAM BY PATHOLOGIST: CPT

## 2022-11-10 PROCEDURE — 360N000076 HC SURGERY LEVEL 3, PER MIN: Performed by: SURGERY

## 2022-11-10 PROCEDURE — 99140 ANES COMP EMERGENCY COND: CPT | Performed by: NURSE ANESTHETIST, CERTIFIED REGISTERED

## 2022-11-10 PROCEDURE — 120N000001 HC R&B MED SURG/OB

## 2022-11-10 PROCEDURE — 88311 DECALCIFY TISSUE: CPT

## 2022-11-10 PROCEDURE — 370N000017 HC ANESTHESIA TECHNICAL FEE, PER MIN: Performed by: SURGERY

## 2022-11-10 PROCEDURE — 88309 TISSUE EXAM BY PATHOLOGIST: CPT

## 2022-11-10 PROCEDURE — 250N000025 HC SEVOFLURANE, PER MIN: Performed by: SURGERY

## 2022-11-10 PROCEDURE — 0DTF0ZZ RESECTION OF RIGHT LARGE INTESTINE, OPEN APPROACH: ICD-10-PCS | Performed by: SURGERY

## 2022-11-10 PROCEDURE — 258N000001 HC RX 258: Performed by: SURGERY

## 2022-11-10 RX ORDER — CEFAZOLIN SODIUM 1 G/3ML
INJECTION, POWDER, FOR SOLUTION INTRAMUSCULAR; INTRAVENOUS PRN
Status: DISCONTINUED | OUTPATIENT
Start: 2022-11-10 | End: 2022-11-10

## 2022-11-10 RX ORDER — ONDANSETRON 2 MG/ML
INJECTION INTRAMUSCULAR; INTRAVENOUS PRN
Status: DISCONTINUED | OUTPATIENT
Start: 2022-11-10 | End: 2022-11-10

## 2022-11-10 RX ORDER — SODIUM CHLORIDE, SODIUM LACTATE, POTASSIUM CHLORIDE, CALCIUM CHLORIDE 600; 310; 30; 20 MG/100ML; MG/100ML; MG/100ML; MG/100ML
INJECTION, SOLUTION INTRAVENOUS CONTINUOUS
Status: DISCONTINUED | OUTPATIENT
Start: 2022-11-10 | End: 2022-11-10 | Stop reason: HOSPADM

## 2022-11-10 RX ORDER — ONDANSETRON 4 MG/1
4 TABLET, ORALLY DISINTEGRATING ORAL EVERY 6 HOURS PRN
Status: DISCONTINUED | OUTPATIENT
Start: 2022-11-10 | End: 2022-01-01 | Stop reason: HOSPADM

## 2022-11-10 RX ORDER — PROPOFOL 10 MG/ML
INJECTION, EMULSION INTRAVENOUS PRN
Status: DISCONTINUED | OUTPATIENT
Start: 2022-11-10 | End: 2022-11-10

## 2022-11-10 RX ORDER — PROCHLORPERAZINE MALEATE 10 MG
10 TABLET ORAL EVERY 6 HOURS PRN
Status: DISCONTINUED | OUTPATIENT
Start: 2022-11-10 | End: 2022-01-01 | Stop reason: HOSPADM

## 2022-11-10 RX ORDER — MAGNESIUM HYDROXIDE 1200 MG/15ML
LIQUID ORAL PRN
Status: DISCONTINUED | OUTPATIENT
Start: 2022-11-10 | End: 2022-11-10 | Stop reason: HOSPADM

## 2022-11-10 RX ORDER — DIPHENHYDRAMINE HYDROCHLORIDE 50 MG/ML
25 INJECTION INTRAMUSCULAR; INTRAVENOUS ONCE
Status: COMPLETED | OUTPATIENT
Start: 2022-11-10 | End: 2022-11-10

## 2022-11-10 RX ORDER — FENTANYL CITRATE 50 UG/ML
INJECTION, SOLUTION INTRAMUSCULAR; INTRAVENOUS PRN
Status: DISCONTINUED | OUTPATIENT
Start: 2022-11-10 | End: 2022-11-10

## 2022-11-10 RX ORDER — ONDANSETRON 4 MG/1
4 TABLET, ORALLY DISINTEGRATING ORAL EVERY 30 MIN PRN
Status: DISCONTINUED | OUTPATIENT
Start: 2022-11-10 | End: 2022-11-10 | Stop reason: HOSPADM

## 2022-11-10 RX ORDER — HYDROMORPHONE HCL IN WATER/PF 6 MG/30 ML
0.4 PATIENT CONTROLLED ANALGESIA SYRINGE INTRAVENOUS
Status: DISCONTINUED | OUTPATIENT
Start: 2022-11-10 | End: 2022-01-01 | Stop reason: HOSPADM

## 2022-11-10 RX ORDER — MORPHINE SULFATE 0.5 MG/ML
INJECTION, SOLUTION EPIDURAL; INTRATHECAL; INTRAVENOUS PRN
Status: DISCONTINUED | OUTPATIENT
Start: 2022-11-10 | End: 2022-11-10

## 2022-11-10 RX ORDER — ONDANSETRON 2 MG/ML
4 INJECTION INTRAMUSCULAR; INTRAVENOUS EVERY 6 HOURS PRN
Status: DISCONTINUED | OUTPATIENT
Start: 2022-11-10 | End: 2022-01-01 | Stop reason: HOSPADM

## 2022-11-10 RX ORDER — NALOXONE HYDROCHLORIDE 0.4 MG/ML
0.2 INJECTION, SOLUTION INTRAMUSCULAR; INTRAVENOUS; SUBCUTANEOUS
Status: DISCONTINUED | OUTPATIENT
Start: 2022-11-10 | End: 2022-01-01 | Stop reason: HOSPADM

## 2022-11-10 RX ORDER — ACETAMINOPHEN 10 MG/ML
1000 INJECTION, SOLUTION INTRAVENOUS EVERY 6 HOURS
Status: DISCONTINUED | OUTPATIENT
Start: 2022-11-10 | End: 2022-11-15

## 2022-11-10 RX ORDER — VECURONIUM BROMIDE 1 MG/ML
INJECTION, POWDER, LYOPHILIZED, FOR SOLUTION INTRAVENOUS PRN
Status: DISCONTINUED | OUTPATIENT
Start: 2022-11-10 | End: 2022-11-10

## 2022-11-10 RX ORDER — FENTANYL CITRATE 50 UG/ML
50 INJECTION, SOLUTION INTRAMUSCULAR; INTRAVENOUS EVERY 5 MIN PRN
Status: DISCONTINUED | OUTPATIENT
Start: 2022-11-10 | End: 2022-11-10 | Stop reason: HOSPADM

## 2022-11-10 RX ORDER — DEXAMETHASONE SODIUM PHOSPHATE 4 MG/ML
INJECTION, SOLUTION INTRA-ARTICULAR; INTRALESIONAL; INTRAMUSCULAR; INTRAVENOUS; SOFT TISSUE PRN
Status: DISCONTINUED | OUTPATIENT
Start: 2022-11-10 | End: 2022-11-10

## 2022-11-10 RX ORDER — LIDOCAINE HYDROCHLORIDE 20 MG/ML
INJECTION, SOLUTION INFILTRATION; PERINEURAL PRN
Status: DISCONTINUED | OUTPATIENT
Start: 2022-11-10 | End: 2022-11-10

## 2022-11-10 RX ORDER — HYDROXYZINE HYDROCHLORIDE 25 MG/ML
25 INJECTION, SOLUTION INTRAMUSCULAR ONCE
Status: COMPLETED | OUTPATIENT
Start: 2022-11-10 | End: 2022-11-10

## 2022-11-10 RX ORDER — OXYCODONE HYDROCHLORIDE 5 MG/1
5 TABLET ORAL EVERY 4 HOURS PRN
Status: DISCONTINUED | OUTPATIENT
Start: 2022-11-10 | End: 2022-11-10 | Stop reason: HOSPADM

## 2022-11-10 RX ORDER — NALOXONE HYDROCHLORIDE 0.4 MG/ML
0.4 INJECTION, SOLUTION INTRAMUSCULAR; INTRAVENOUS; SUBCUTANEOUS
Status: DISCONTINUED | OUTPATIENT
Start: 2022-11-10 | End: 2022-01-01 | Stop reason: HOSPADM

## 2022-11-10 RX ORDER — HYDROMORPHONE HCL IN WATER/PF 6 MG/30 ML
0.4 PATIENT CONTROLLED ANALGESIA SYRINGE INTRAVENOUS EVERY 5 MIN PRN
Status: DISCONTINUED | OUTPATIENT
Start: 2022-11-10 | End: 2022-11-10 | Stop reason: HOSPADM

## 2022-11-10 RX ORDER — BUPIVACAINE HYDROCHLORIDE 2.5 MG/ML
INJECTION, SOLUTION INFILTRATION; PERINEURAL PRN
Status: DISCONTINUED | OUTPATIENT
Start: 2022-11-10 | End: 2022-11-10 | Stop reason: HOSPADM

## 2022-11-10 RX ORDER — LIDOCAINE HYDROCHLORIDE 10 MG/ML
INJECTION, SOLUTION EPIDURAL; INFILTRATION; INTRACAUDAL; PERINEURAL PRN
Status: DISCONTINUED | OUTPATIENT
Start: 2022-11-10 | End: 2022-11-10

## 2022-11-10 RX ORDER — CEFAZOLIN SODIUM/WATER 2 G/20 ML
2 SYRINGE (ML) INTRAVENOUS SEE ADMIN INSTRUCTIONS
Status: DISCONTINUED | OUTPATIENT
Start: 2022-11-10 | End: 2022-11-10 | Stop reason: HOSPADM

## 2022-11-10 RX ORDER — CEFAZOLIN SODIUM/WATER 2 G/20 ML
2 SYRINGE (ML) INTRAVENOUS
Status: COMPLETED | OUTPATIENT
Start: 2022-11-10 | End: 2022-11-10

## 2022-11-10 RX ORDER — ENOXAPARIN SODIUM 100 MG/ML
40 INJECTION SUBCUTANEOUS EVERY 24 HOURS
Status: DISCONTINUED | OUTPATIENT
Start: 2022-11-11 | End: 2022-11-13

## 2022-11-10 RX ORDER — HYDROMORPHONE HCL IN WATER/PF 6 MG/30 ML
0.2 PATIENT CONTROLLED ANALGESIA SYRINGE INTRAVENOUS
Status: DISCONTINUED | OUTPATIENT
Start: 2022-11-10 | End: 2022-01-01 | Stop reason: HOSPADM

## 2022-11-10 RX ORDER — NEOSTIGMINE METHYLSULFATE 1 MG/ML
VIAL (ML) INJECTION PRN
Status: DISCONTINUED | OUTPATIENT
Start: 2022-11-10 | End: 2022-11-10

## 2022-11-10 RX ORDER — GLYCOPYRROLATE 0.2 MG/ML
INJECTION, SOLUTION INTRAMUSCULAR; INTRAVENOUS PRN
Status: DISCONTINUED | OUTPATIENT
Start: 2022-11-10 | End: 2022-11-10

## 2022-11-10 RX ORDER — HYDROMORPHONE HYDROCHLORIDE 1 MG/ML
.2-.4 INJECTION, SOLUTION INTRAMUSCULAR; INTRAVENOUS; SUBCUTANEOUS
Status: DISCONTINUED | OUTPATIENT
Start: 2022-11-10 | End: 2022-11-10

## 2022-11-10 RX ORDER — SODIUM CHLORIDE 9 MG/ML
INJECTION, SOLUTION INTRAVENOUS CONTINUOUS
Status: DISCONTINUED | OUTPATIENT
Start: 2022-11-10 | End: 2022-11-11

## 2022-11-10 RX ORDER — SODIUM CHLORIDE, SODIUM LACTATE, POTASSIUM CHLORIDE, CALCIUM CHLORIDE 600; 310; 30; 20 MG/100ML; MG/100ML; MG/100ML; MG/100ML
INJECTION, SOLUTION INTRAVENOUS CONTINUOUS PRN
Status: DISCONTINUED | OUTPATIENT
Start: 2022-11-10 | End: 2022-11-10

## 2022-11-10 RX ORDER — ONDANSETRON 2 MG/ML
4 INJECTION INTRAMUSCULAR; INTRAVENOUS EVERY 30 MIN PRN
Status: DISCONTINUED | OUTPATIENT
Start: 2022-11-10 | End: 2022-11-10 | Stop reason: HOSPADM

## 2022-11-10 RX ORDER — KETAMINE HYDROCHLORIDE 10 MG/ML
INJECTION INTRAMUSCULAR; INTRAVENOUS PRN
Status: DISCONTINUED | OUTPATIENT
Start: 2022-11-10 | End: 2022-11-10

## 2022-11-10 RX ORDER — ACETAMINOPHEN 10 MG/ML
INJECTION, SOLUTION INTRAVENOUS PRN
Status: DISCONTINUED | OUTPATIENT
Start: 2022-11-10 | End: 2022-11-10

## 2022-11-10 RX ORDER — BUPIVACAINE HYDROCHLORIDE 7.5 MG/ML
INJECTION, SOLUTION INTRASPINAL PRN
Status: DISCONTINUED | OUTPATIENT
Start: 2022-11-10 | End: 2022-11-10

## 2022-11-10 RX ADMIN — DEXTROSE AND SODIUM CHLORIDE: 5; 450 INJECTION, SOLUTION INTRAVENOUS at 23:14

## 2022-11-10 RX ADMIN — SODIUM CHLORIDE, SODIUM LACTATE, POTASSIUM CHLORIDE, AND CALCIUM CHLORIDE: 600; 310; 30; 20 INJECTION, SOLUTION INTRAVENOUS at 16:02

## 2022-11-10 RX ADMIN — Medication 1 ML: at 03:28

## 2022-11-10 RX ADMIN — DEXAMETHASONE SODIUM PHOSPHATE 4 MG: 4 INJECTION, SOLUTION INTRA-ARTICULAR; INTRALESIONAL; INTRAMUSCULAR; INTRAVENOUS; SOFT TISSUE at 13:14

## 2022-11-10 RX ADMIN — HYDROMORPHONE HYDROCHLORIDE 0.5 MG: 1 INJECTION, SOLUTION INTRAMUSCULAR; INTRAVENOUS; SUBCUTANEOUS at 15:54

## 2022-11-10 RX ADMIN — MORPHINE SULFATE 0.1 MG: 0.5 INJECTION, SOLUTION EPIDURAL; INTRATHECAL; INTRAVENOUS at 13:02

## 2022-11-10 RX ADMIN — HYDROMORPHONE HYDROCHLORIDE 0.4 MG: 0.2 INJECTION, SOLUTION INTRAMUSCULAR; INTRAVENOUS; SUBCUTANEOUS at 23:21

## 2022-11-10 RX ADMIN — HYDROMORPHONE HYDROCHLORIDE 0.4 MG: 0.2 INJECTION, SOLUTION INTRAMUSCULAR; INTRAVENOUS; SUBCUTANEOUS at 20:56

## 2022-11-10 RX ADMIN — HYDROMORPHONE HYDROCHLORIDE 0.2 MG: 0.2 INJECTION, SOLUTION INTRAMUSCULAR; INTRAVENOUS; SUBCUTANEOUS at 07:26

## 2022-11-10 RX ADMIN — GLYCOPYRROLATE 0.8 MG: 0.2 INJECTION INTRAMUSCULAR; INTRAVENOUS at 16:47

## 2022-11-10 RX ADMIN — Medication 20 MG: at 13:11

## 2022-11-10 RX ADMIN — SODIUM CHLORIDE, SODIUM LACTATE, POTASSIUM CHLORIDE, AND CALCIUM CHLORIDE: 600; 310; 30; 20 INJECTION, SOLUTION INTRAVENOUS at 13:35

## 2022-11-10 RX ADMIN — PROPOFOL 160 MG: 10 INJECTION, EMULSION INTRAVENOUS at 13:06

## 2022-11-10 RX ADMIN — CEFAZOLIN 2 G: 1 INJECTION, POWDER, FOR SOLUTION INTRAMUSCULAR; INTRAVENOUS at 17:11

## 2022-11-10 RX ADMIN — HYDROXYZINE HYDROCHLORIDE 25 MG: 25 INJECTION, SOLUTION INTRAMUSCULAR at 18:43

## 2022-11-10 RX ADMIN — ONDANSETRON HYDROCHLORIDE 4 MG: 2 SOLUTION INTRAMUSCULAR; INTRAVENOUS at 13:06

## 2022-11-10 RX ADMIN — MIDAZOLAM HYDROCHLORIDE 2 MG: 1 INJECTION, SOLUTION INTRAMUSCULAR; INTRAVENOUS at 12:55

## 2022-11-10 RX ADMIN — VECURONIUM BROMIDE 1 MG: 1 INJECTION, POWDER, LYOPHILIZED, FOR SOLUTION INTRAVENOUS at 16:19

## 2022-11-10 RX ADMIN — ROCURONIUM BROMIDE 20 MG: 50 INJECTION, SOLUTION INTRAVENOUS at 13:26

## 2022-11-10 RX ADMIN — FENTANYL CITRATE 50 MCG: 50 INJECTION, SOLUTION INTRAMUSCULAR; INTRAVENOUS at 17:55

## 2022-11-10 RX ADMIN — Medication 20 MG: at 12:55

## 2022-11-10 RX ADMIN — SODIUM CHLORIDE, SODIUM LACTATE, POTASSIUM CHLORIDE, AND CALCIUM CHLORIDE: 600; 310; 30; 20 INJECTION, SOLUTION INTRAVENOUS at 13:20

## 2022-11-10 RX ADMIN — Medication 10 MG: at 14:35

## 2022-11-10 RX ADMIN — ROCURONIUM BROMIDE 20 MG: 50 INJECTION, SOLUTION INTRAVENOUS at 14:10

## 2022-11-10 RX ADMIN — ROCURONIUM BROMIDE 50 MG: 50 INJECTION, SOLUTION INTRAVENOUS at 13:06

## 2022-11-10 RX ADMIN — SODIUM CHLORIDE, POTASSIUM CHLORIDE, SODIUM LACTATE AND CALCIUM CHLORIDE: 600; 310; 30; 20 INJECTION, SOLUTION INTRAVENOUS at 18:53

## 2022-11-10 RX ADMIN — DIPHENHYDRAMINE HYDROCHLORIDE 25 MG: 50 INJECTION, SOLUTION INTRAMUSCULAR; INTRAVENOUS at 17:46

## 2022-11-10 RX ADMIN — FENTANYL CITRATE 50 MCG: 50 INJECTION, SOLUTION INTRAMUSCULAR; INTRAVENOUS at 14:10

## 2022-11-10 RX ADMIN — NEOSTIGMINE METHYLSULFATE 5 MG: 1 INJECTION, SOLUTION INTRAVENOUS at 16:47

## 2022-11-10 RX ADMIN — ACETAMINOPHEN 1000 MG: 10 INJECTION, SOLUTION INTRAVENOUS at 22:17

## 2022-11-10 RX ADMIN — FAMOTIDINE 20 MG: 10 INJECTION INTRAVENOUS at 10:17

## 2022-11-10 RX ADMIN — VECURONIUM BROMIDE 1 MG: 1 INJECTION, POWDER, LYOPHILIZED, FOR SOLUTION INTRAVENOUS at 15:30

## 2022-11-10 RX ADMIN — ONDANSETRON 4 MG: 2 INJECTION INTRAMUSCULAR; INTRAVENOUS at 17:50

## 2022-11-10 RX ADMIN — HYDROMORPHONE HYDROCHLORIDE 0.4 MG: 1 INJECTION, SOLUTION INTRAMUSCULAR; INTRAVENOUS; SUBCUTANEOUS at 18:00

## 2022-11-10 RX ADMIN — SODIUM CHLORIDE: 9 INJECTION, SOLUTION INTRAVENOUS at 07:32

## 2022-11-10 RX ADMIN — HYDROMORPHONE HYDROCHLORIDE 1 MG: 1 INJECTION, SOLUTION INTRAMUSCULAR; INTRAVENOUS; SUBCUTANEOUS at 17:18

## 2022-11-10 RX ADMIN — VECURONIUM BROMIDE 1 MG: 1 INJECTION, POWDER, LYOPHILIZED, FOR SOLUTION INTRAVENOUS at 16:29

## 2022-11-10 RX ADMIN — FENTANYL CITRATE 50 MCG: 50 INJECTION, SOLUTION INTRAMUSCULAR; INTRAVENOUS at 18:25

## 2022-11-10 RX ADMIN — LIDOCAINE HYDROCHLORIDE 2 ML: 10 INJECTION, SOLUTION EPIDURAL; INFILTRATION; INTRACAUDAL; PERINEURAL at 13:01

## 2022-11-10 RX ADMIN — SODIUM CHLORIDE: 9 INJECTION, SOLUTION INTRAVENOUS at 22:15

## 2022-11-10 RX ADMIN — BUPIVACAINE HYDROCHLORIDE 1.4 ML: 7.5 INJECTION, SOLUTION INTRASPINAL at 13:02

## 2022-11-10 RX ADMIN — ACETAMINOPHEN 1000 MG: 10 INJECTION, SOLUTION INTRAVENOUS at 15:24

## 2022-11-10 RX ADMIN — PROPOFOL 40 MG: 10 INJECTION, EMULSION INTRAVENOUS at 13:15

## 2022-11-10 RX ADMIN — Medication 2 G: at 13:09

## 2022-11-10 RX ADMIN — VECURONIUM BROMIDE 1 MG: 1 INJECTION, POWDER, LYOPHILIZED, FOR SOLUTION INTRAVENOUS at 15:46

## 2022-11-10 RX ADMIN — HYDROMORPHONE HYDROCHLORIDE 0.5 MG: 1 INJECTION, SOLUTION INTRAMUSCULAR; INTRAVENOUS; SUBCUTANEOUS at 16:52

## 2022-11-10 RX ADMIN — ROCURONIUM BROMIDE 10 MG: 50 INJECTION, SOLUTION INTRAVENOUS at 15:06

## 2022-11-10 RX ADMIN — FENTANYL CITRATE 50 MCG: 50 INJECTION, SOLUTION INTRAMUSCULAR; INTRAVENOUS at 13:05

## 2022-11-10 RX ADMIN — HYDROMORPHONE HYDROCHLORIDE 0.4 MG: 1 INJECTION, SOLUTION INTRAMUSCULAR; INTRAVENOUS; SUBCUTANEOUS at 18:18

## 2022-11-10 RX ADMIN — LIDOCAINE HYDROCHLORIDE 100 MG: 20 INJECTION, SOLUTION INFILTRATION; PERINEURAL at 13:06

## 2022-11-10 ASSESSMENT — ACTIVITIES OF DAILY LIVING (ADL)
ADLS_ACUITY_SCORE: 24
ADLS_ACUITY_SCORE: 24
ADLS_ACUITY_SCORE: 23
ADLS_ACUITY_SCORE: 24
ADLS_ACUITY_SCORE: 23
ADLS_ACUITY_SCORE: 24

## 2022-11-10 ASSESSMENT — LIFESTYLE VARIABLES: TOBACCO_USE: 1

## 2022-11-10 NOTE — INTERVAL H&P NOTE
"History and Physical Update    The history and physical has been reviewed and the patient has been examined.  Changes to the history or physical condition are as follows:    Surgical Progress Note      HD# 3  SBO     Subjective: Minimal flatus. No BM. Pain better than on admission.     Objective:     BP (!) 146/97 (BP Location: Right arm, Patient Position: Supine, Cuff Size: Adult Regular)   Pulse 71   Temp 98.8  F (37.1  C) (Tympanic)   Resp 20   Ht 1.778 m (5' 10\")   Wt 89.4 kg (197 lb 3.2 oz)   SpO2 95%   BMI 28.30 kg/m       U/O: 175 mL last shift  N mL Last shift, 2350 mL yesterday        ABDOMEN: Softer. Non-tender.     LABS          Recent Labs   Lab Test 11/10/22  0618 11/09/22  0530 03/18/21  0505 17  1500 17  0759   WBC 6.2 4.6   < >  --   --    RBC 4.50 5.02   < >  --   --    HGB 13.7 15.4   < > 14.0 14.6   HCT 40.1 44.4   < > 41.1 42.8   MCV 89 88   < > 85 85   MCH 30.4 30.7   < > 28.8 29.0   MCHC 34.2 34.7   < > 34.1 34.1    245   < > 226 247   MPV  --   --   --  8.2 8.4    < > = values in this interval not displayed.              Recent Labs   Lab Test 11/10/22  0618 11/09/22  0530   POTASSIUM 4.0 4.4   CHLORIDE 104 103   BUN 19.1 23.3*                Recent Labs   Lab Test 11/10/22  0618 11/09/22  0530 21  1635 21  1622 21  1611 21  0637   PROTEIN  --   --   --  30*  --  300*   BILITOTAL 0.7 0.9   < >  --    < >  --    AST 18 19   < >  --    < >  --    ALT 20 25   < >  --    < >  --     < > = values in this interval not displayed.               ASSESSMENT  Small bowel obstruction not responding to non-operative treatment after 48 hours.     PLAN  Laparotomy with relief of small bowel obstruction. Risks of bleeding, infection, recurrence, potential injury to bowel or nearby structures discussed and wishes to proceed.        MD Mirza Rodrigues MD        "

## 2022-11-10 NOTE — PROGRESS NOTES
SAFETY CHECKLIST  ID Bands and Risk clasps correct and in place (DNR, Fall risk, Allergy, Latex, Limb):  Yes  All Lines Reconciled and labeled correctly: Yes  Whiteboard updated:Yes  Environmental interventions: Yes

## 2022-11-10 NOTE — ANESTHESIA PROCEDURE NOTES
Airway       Patient location during procedure: OR       Procedure Start/Stop Times: 11/10/2022 1:08 PM  Staff -        CRNA: Aminah Winn APRN CRNA       Performed By: CRNA  Consent for Airway        Urgency: elective  Indications and Patient Condition       Indications for airway management: frank-procedural       Induction type:RSI       Mask difficulty assessment: 0 - not attempted    Final Airway Details       Final airway type: endotracheal airway       Successful airway: ETT - single  Endotracheal Airway Details        ETT size (mm): 8.0       Cuffed: yes       Successful intubation technique: direct laryngoscopy       DL Blade Type: Lopez 2       Grade View of Cords: 1       Adjucts: stylet       Position: Center       Measured from: gums/teeth       Secured at (cm): 24       Bite block used: None    Post intubation assessment        Placement verified by: capnometry, equal breath sounds and chest rise        Number of attempts at approach: 1       Number of other approaches attempted: 0       Secured with: silk tape       Ease of procedure: easy       Dentition: Intact and Unchanged    Medication(s) Administered   Medication Administration Time: 11/10/2022 1:08 PM

## 2022-11-10 NOTE — PLAN OF CARE
"Patient admitted for SBO. VSS. Room air. Afebrile. Occasional pain. PRN Dilaudid given x 1 effectively. Up with SBA. NG patent and continues to have output significant output 1200+. Denies nausea. Passing gas, hiccups.     Problem: Plan of Care - These are the overarching goals to be used throughout the patient stay.    Goal: Plan of Care Review  Description: The Plan of Care Review/Shift note should be completed every shift.  The Outcome Evaluation is a brief statement about your assessment that the patient is improving, declining, or no change.  This information will be displayed automatically on your shift note.  Outcome: Progressing  Goal: Patient-Specific Goal (Individualized)  Description: You can add care plan individualizations to a care plan. Examples of Individualization might be:  \"Parent requests to be called daily at 9am for status\", \"I have a hard time hearing out of my right ear\", or \"Do not touch me to wake me up as it startles me\".  Outcome: Progressing  Goal: Absence of Hospital-Acquired Illness or Injury  Outcome: Progressing  Intervention: Identify and Manage Fall Risk  Recent Flowsheet Documentation  Taken 11/10/2022 0325 by Kathleen Hendricks RN  Safety Promotion/Fall Prevention:    assistive device/personal items within reach    fall prevention program maintained    nonskid shoes/slippers when out of bed    patient and family education    safety round/check completed    activity supervised    bed alarm on    clutter free environment maintained    supervised activity    treat reversible contributory factors    treat underlying cause  Intervention: Prevent Skin Injury  Recent Flowsheet Documentation  Taken 11/10/2022 0325 by Kathleen Hendricks RN  Body Position: position changed independently  Intervention: Prevent and Manage VTE (Venous Thromboembolism) Risk  Recent Flowsheet Documentation  Taken 11/10/2022 0325 by Kathleen Hendricks RN  VTE Prevention/Management:    SCDs (sequential " compression devices) off    patient refused intervention  Goal: Optimal Comfort and Wellbeing  Outcome: Progressing  Intervention: Monitor Pain and Promote Comfort  Recent Flowsheet Documentation  Taken 11/10/2022 0325 by Kathleen Hendricks RN  Pain Management Interventions: declines  Taken 11/9/2022 2024 by Kathleen Hendricks RN  Pain Management Interventions: declines  Goal: Readiness for Transition of Care  Outcome: Progressing     Problem: Intestinal Obstruction  Goal: Optimal Bowel Function  Outcome: Progressing  Intervention: Promote Bowel Function  Recent Flowsheet Documentation  Taken 11/10/2022 0325 by Kathleen Hendricks RN  Body Position: position changed independently  Goal: Fluid and Electrolyte Balance  Outcome: Progressing  Goal: Absence of Infection Signs and Symptoms  Outcome: Progressing  Goal: Optimize Nutrition Status  Outcome: Progressing  Goal: Optimal Pain Control and Function  Outcome: Progressing  Intervention: Prevent or Manage Pain  Recent Flowsheet Documentation  Taken 11/10/2022 0325 by Kathleen Hendricks RN  Pain Management Interventions: declines  Taken 11/9/2022 2024 by Kathleen Hendricks RN  Pain Management Interventions: declines     Problem: Nausea and Vomiting  Goal: Nausea and Vomiting Relief  Outcome: Progressing  Intervention: Prevent and Manage Nausea and Vomiting  Recent Flowsheet Documentation  Taken 11/10/2022 0325 by Kathleen Hendricks RN  Nausea/Vomiting Interventions: nasogastric tube to suction     Problem: Pain Acute  Goal: Optimal Pain Control and Function  Outcome: Progressing  Intervention: Develop Pain Management Plan  Recent Flowsheet Documentation  Taken 11/10/2022 0325 by Kathleen Hendricks RN  Pain Management Interventions: declines  Taken 11/9/2022 2024 by Kathleen Hendricks RN  Pain Management Interventions: declines  Intervention: Prevent or Manage Pain  Recent Flowsheet Documentation  Taken 11/10/2022 0325 by Kathleen Hendricks  RN  Medication Review/Management: medications reviewed   Goal Outcome Evaluation:

## 2022-11-10 NOTE — PROGRESS NOTES
SAFETY CHECKLIST  ID Bands and Risk clasps correct and in place (DNR, Fall risk, Allergy, Latex, Limb):  Yes  All Lines Reconciled and labeled correctly: Yes  Whiteboard updated:Yes  Environmental interventions: Yes - call light, bed low, bed rails x 2, items within reach, bed alarm, slippers  Verify Tele #: NA

## 2022-11-10 NOTE — PROGRESS NOTES
"Surgical Progress Note     HD# 3  SBO    Subjective: Minimal flatus. No BM. Pain better than on admission.    Objective:    BP (!) 146/97 (BP Location: Right arm, Patient Position: Supine, Cuff Size: Adult Regular)   Pulse 71   Temp 98.8  F (37.1  C) (Tympanic)   Resp 20   Ht 1.778 m (5' 10\")   Wt 89.4 kg (197 lb 3.2 oz)   SpO2 95%   BMI 28.30 kg/m      U/O: 175 mL last shift  N mL Last shift, 2350 mL yesterday      ABDOMEN: Softer. Non-tender.    LABS  Recent Labs   Lab Test 11/10/22  0618 11/09/22  0530 21  0505 17  1500 17  0759   WBC 6.2 4.6   < >  --   --    RBC 4.50 5.02   < >  --   --    HGB 13.7 15.4   < > 14.0 14.6   HCT 40.1 44.4   < > 41.1 42.8   MCV 89 88   < > 85 85   MCH 30.4 30.7   < > 28.8 29.0   MCHC 34.2 34.7   < > 34.1 34.1    245   < > 226 247   MPV  --   --   --  8.2 8.4    < > = values in this interval not displayed.       Recent Labs   Lab Test 11/10/22  0618 11/09/22  0530   POTASSIUM 4.0 4.4   CHLORIDE 104 103   BUN 19.1 23.3*       Recent Labs   Lab Test 11/10/22  0618 11/09/22  0530 21  1635 21  1622 21  1611 21  0637   PROTEIN  --   --   --  30*  --  300*   BILITOTAL 0.7 0.9   < >  --    < >  --    AST 18 19   < >  --    < >  --    ALT 20 25   < >  --    < >  --     < > = values in this interval not displayed.           ASSESSMENT  Small bowel obstruction not responding to non-operative treatment after 48 hours.    PLAN  Laparotomy with relief of small bowel obstruction. Risks of bleeding, infection, recurrence, potential injury to bowel or nearby structures discussed and wishes to proceed.      Mirza Dumont MD      "

## 2022-11-10 NOTE — ANESTHESIA PROCEDURE NOTES
"Intrathecal injection Procedure Note    Pre-Procedure   Staff -        CRNA: Aminah Winn APRN CRNA       Performed By: CRNA       Location: OR       Procedure Start/Stop Times: 11/10/2022 12:58 PM and 11/10/2022 1:02 PM       Pre-Anesthestic Checklist: patient identified, IV checked, risks and benefits discussed, informed consent, monitors and equipment checked, pre-op evaluation, at physician/surgeon's request and post-op pain management  Timeout:       Correct Patient: Yes        Correct Procedure: Yes        Correct Site: Yes        Correct Position: Yes   Procedure Documentation  Procedure: intrathecal injection       Diagnosis: Post operative pain control       Patient Position: sitting       Patient Prep/Sterile Barriers: sterile gloves, mask, patient draped       Skin prep: Chloraprep       Insertion Site: L3-4. (midline approach).       Needle Gauge: 25.        Needle Length (Inches): 3.5        Spinal Needle Type: Gordy tip       Introducer used       Introducer: 20 G       # of attempts: 1 and  # of redirects:  0    Assessment/Narrative         Paresthesias: No.       CSF fluid: clear.    Medication(s) Administered   Medication Administration Time: 11/10/2022 12:58 PM      FOR [facility] ([facility] ONLY:   Pain Team Contact information: please page the Pain Team Via [employer]. Search \"Pain\". During daytime hours, please page the attending first. At night please page the resident first.    "

## 2022-11-10 NOTE — ANESTHESIA PREPROCEDURE EVALUATION
"Anesthesia Pre-Procedure Evaluation    Patient: Julius Hansen   MRN: [id number] : 1960        Procedure : Procedure(s):  LAPAROTOMY WITH RELIEF OF SMALL BOWEL OBSTRUCTION          Past Medical History:   Diagnosis Date     Personal history of other medical treatment (CODE)     No Comments Provided      Past Surgical History:   Procedure Laterality Date     COLONOSCOPY N/A 2021    1 small tubular adenoma, follow up 2026     CYSTOSCOPY, URETEROSCOPY, COMBINED Right 2021    Procedure: Cystoscopy and right retrograde pyelogram and diagnostic ureteroscopy;  Surgeon: Trevon Montalvo MD;  Location: [facility] OR     EGD  2022     NEPHRECTOMY Right 2022    urothelial carcinoma     TRANSURETHRAL RESECTION OF BLADDER N/A 2022      No Known Allergies   Social History     Tobacco Use     Smoking status: Every Day     Packs/day: 1.00     Years: 20.00     Pack years: 20.00     Types: Cigarettes     Smokeless tobacco: Never   Substance Use Topics     Alcohol use: Yes     Comment: \"every night I have a couple after work\"      Wt Readings from Last 1 Encounters:   11/10/22 89.4 kg (197 lb 3.2 oz)        Anesthesia Evaluation   Pt has had prior anesthetic.     History of anesthetic complications   Wakes up angry.    ROS/MED HX  ENT/Pulmonary: Comment: Motivated to quit.  Hasn't smoked in two weeks.  Smoking cessation information given to patient.     (+) tobacco use, Current use,     Neurologic:  - neg neurologic ROS     Cardiovascular:  - neg cardiovascular ROS   (+) -----Previous cardiac testing   Echo: Date: Results:    Stress Test: Date: Results:    ECG Reviewed: Date: 3/29/21 Results:    Cath: Date: Results:      METS/Exercise Tolerance: >4 METS    Hematologic:  - neg hematologic  ROS     Musculoskeletal:  - neg musculoskeletal ROS     GI/Hepatic: Comment: Bowel obstruction.  NG tube in place.      Renal/Genitourinary: Comment: Nephrectomy for CA    (+) renal disease, type: CRI,     Endo:  - " neg endo ROS     Psychiatric/Substance Use:  - neg psychiatric ROS     Infectious Disease:  - neg infectious disease ROS     Malignancy:   (+) Malignancy, History of Other.Other CA Bladder Remission status post Surgery and Chemo.    Other:  - neg other ROS          Physical Exam    Airway        Mallampati: II   TM distance: > 3 FB   Neck ROM: full   Mouth opening: > 3 cm    Respiratory Devices and Support         Dental       (+) upper dentures, missing and lower dentures      Cardiovascular   cardiovascular exam normal       Rhythm and rate: regular and normal     Pulmonary   pulmonary exam normal        breath sounds clear to auscultation           OUTSIDE LABS:  CBC:   Lab Results   Component Value Date    WBC 6.2 11/10/2022    WBC 4.6 11/09/2022    HGB 13.7 11/10/2022    HGB 15.4 11/09/2022    HCT 40.1 11/10/2022    HCT 44.4 11/09/2022     11/10/2022     11/09/2022     BMP:   Lab Results   Component Value Date     11/10/2022     11/09/2022    POTASSIUM 4.0 11/10/2022    POTASSIUM 4.4 11/09/2022    CHLORIDE 104 11/10/2022    CHLORIDE 103 11/09/2022    CO2 25 11/10/2022    CO2 28 11/09/2022    BUN 19.1 11/10/2022    BUN 23.3 (H) 11/09/2022    CR 1.30 (H) 11/10/2022    CR 1.41 (H) 11/09/2022    GLC 75 11/10/2022    GLC 78 11/10/2022     COAGS:   Lab Results   Component Value Date    PTT 29 08/14/2017    INR 1.2 08/14/2017     POC: No results found for: BGM, HCG, HCGS  HEPATIC:   Lab Results   Component Value Date    ALBUMIN 3.5 11/10/2022    PROTTOTAL 6.1 (L) 11/10/2022    ALT 20 11/10/2022    AST 18 11/10/2022    ALKPHOS 92 11/10/2022    BILITOTAL 0.7 11/10/2022     OTHER:   Lab Results   Component Value Date    LACT 0.7 03/18/2021    SANDY 8.9 11/10/2022    MAG 2.0 08/13/2017    LIPASE 12 11/07/2022    CRP 46.20 11/09/2022    SED 21 (H) 01/18/2014       Anesthesia Plan    ASA Status:  3, emergent    NPO Status:  NPO Appropriate    Anesthesia Type: General.     - Airway: ETT   Induction:  RSI.   Maintenance: Balanced.        Consents    Anesthesia Plan(s) and associated risks, benefits, and realistic alternatives discussed. Questions answered and patient/representative(s) expressed understanding.     - Discussed: Risks, Benefits and Alternatives for BOTH SEDATION and the PROCEDURE were discussed     - Discussed with:  Patient, Spouse      - Extended Intubation/Ventilatory Support Discussed: Yes.      - Patient is DNR/DNI Status: No    Use of blood products discussed: Yes.     - Discussed with: Patient.     - Consented: consented to blood products            Reason for refusal: other.     Postoperative Care    Pain management: IV analgesics, Neuraxial analgesia.   PONV prophylaxis: Ondansetron (or other 5HT-3), Dexamethasone or Solumedrol     Comments:                GABRIEL ARSHAD CRNA

## 2022-11-10 NOTE — BRIEF OP NOTE
[facility]    Brief Operative Note    Pre-operative diagnosis: Small bowel obstruction (H) [K56.609]  Post-operative diagnosis Small bowel obstruction due to probable metastatic urothelial vcancer    Procedure: Procedure(s):  LAPAROTOMY WITH RELIEF OF SMALL BOWEL OBSTRUCTION CONVERTED TO RIGHT HEMICOLECTOMY  Surgeon: Surgeon(s) and Role:     * Mirza Dumont MD - Primary  Anesthesia: General   Estimated Blood Loss: 300 ml    Drains: None  Specimens:   ID Type Source Tests Collected by Time Destination   1 : abdomenal wall mass Tissue Abdomen SURGICAL PATHOLOGY EXAM Mirza Dumont MD 11/10/2022  1:40 PM    2 : RIGHT COLON WITH EXTRINSIC TUMOR MASSES; HISTORY OF UROILEAL CARCINOMA OF RIGHT KIDNEY PREVIOUSLY REMOVED AT [facility] IN [place] Resection Small Intestine, Terminal Ileum SURGICAL PATHOLOGY EXAM Mirza Dumont MD 11/10/2022  3:28 PM      Findings:   Several hard masses along terminal ileum and right colon casing obstruction. Also hard mass in upper abdominal incison, likely calcified scar..  Complications: None.  Implants: * No implants in log *

## 2022-11-10 NOTE — ANESTHESIA CARE TRANSFER NOTE
Patient: Julius Hansen    Procedure: Procedure(s):  LAPAROTOMY WITH RELIEF OF SMALL BOWEL OBSTRUCTION CONVERTED TO RIGHT HEMICOLECTOMY       Diagnosis: Small bowel obstruction (H) [K56.609]  Diagnosis Additional Information: No value filed.    Anesthesia Type:   General     Note:    Oropharynx: oropharynx clear of all foreign objects and spontaneously breathing  Level of Consciousness: drowsy  Oxygen Supplementation: room air    Independent Airway: airway patency satisfactory and stable  Dentition: dentition unchanged  Vital Signs Stable: post-procedure vital signs reviewed and stable  Report to RN Given: handoff report given  Patient transferred to: PACU    Handoff Report: Identifed the Patient, Identified the Reponsible Provider, Reviewed the pertinent medical history, Discussed the surgical course, Reviewed Intra-OP anesthesia mangement and issues during anesthesia, Set expectations for post-procedure period and Allowed opportunity for questions and acknowledgement of understanding      Vitals:  Vitals Value Taken Time   BP     Temp     Pulse     Resp     SpO2         Electronically Signed By: GABRIEL ARSHAD CRNA  November 10, 2022  5:23 PM

## 2022-11-10 NOTE — PLAN OF CARE
Problem: Plan of Care - These are the overarching goals to be used throughout the patient stay.    Goal: Plan of Care Review  Description: The Plan of Care Review/Shift note should be completed every shift.  The Outcome Evaluation is a brief statement about your assessment that the patient is improving, declining, or no change.  This information will be displayed automatically on your shift note.  Outcome: Progressing  Flowsheets (Taken 11/10/2022 0957)  Overall Patient Progress: no change  Goal: Absence of Hospital-Acquired Illness or Injury  Intervention: Identify and Manage Fall Risk  Recent Flowsheet Documentation  Taken 11/10/2022 0719 by Yazmin Ron RN  Safety Promotion/Fall Prevention:   assistive device/personal items within reach   nonskid shoes/slippers when out of bed  Intervention: Prevent Skin Injury  Recent Flowsheet Documentation  Taken 11/10/2022 0726 by Yazmin Ron RN  Body Position: position changed independently  Intervention: Prevent and Manage VTE (Venous Thromboembolism) Risk  Recent Flowsheet Documentation  Taken 11/10/2022 0719 by Yazmin Ron RN  VTE Prevention/Management:   SCDs (sequential compression devices) off   patient refused intervention  Goal: Optimal Comfort and Wellbeing  Intervention: Monitor Pain and Promote Comfort  Recent Flowsheet Documentation  Taken 11/10/2022 0741 by Yazmin Ron RN  Pain Management Interventions:   rest   repositioned   shower   pain management plan reviewed with patient/caregiver  Taken 11/10/2022 0719 by Yazmin Ron RN  Pain Management Interventions: medication (see MAR)     Problem: Intestinal Obstruction  Goal: Optimal Bowel Function  Intervention: Promote Bowel Function  Recent Flowsheet Documentation  Taken 11/10/2022 0726 by Yazmin Ron RN  Body Position: position changed independently  Head of Bed (HOB) Positioning: HOB at 20-30 degrees  Goal: Optimal Pain Control and Function  Intervention: Prevent  or Manage Pain  Recent Flowsheet Documentation  Taken 11/10/2022 0741 by Yazmin Ron RN  Pain Management Interventions:   rest   repositioned   shower   pain management plan reviewed with patient/caregiver  Taken 11/10/2022 0719 by Yazmin Ron RN  Pain Management Interventions: medication (see MAR)     Problem: Pain Acute  Goal: Optimal Pain Control and Function  Intervention: Develop Pain Management Plan  Recent Flowsheet Documentation  Taken 11/10/2022 0741 by Yazmin Ron RN  Pain Management Interventions:   rest   repositioned   shower   pain management plan reviewed with patient/caregiver  Taken 11/10/2022 0719 by Yazmin Ron RN  Pain Management Interventions: medication (see MAR)  Intervention: Prevent or Manage Pain  Recent Flowsheet Documentation  Taken 11/10/2022 0719 by Yazmin Ron RN  Medication Review/Management: medications reviewed   Goal Outcome Evaluation:           Overall Patient Progress: no changeOverall Patient Progress: no change     Pt A/O, up independently with SBA r/t tubing.  Up in shower this AM for pre-op shower with Chlorhexidene.  Denies nausea.  NG draining thin brown fluid at LIS. Bowel sounds faint on left and hypoactive on right, abd distended and tender. Declined SCDs until after surgery.  C/O abd pain and was given Dilaudid without relief.  Going for laparoscopic relief of his SBO today.  Yazmin Ron RN on 11/10/2022 at 10:04 AM

## 2022-11-10 NOTE — PROGRESS NOTES
[facility]    Medicine Progress Note - Hospitalist Service    Date of Admission:  11/7/2022    Assessment & Plan      Julius Hansen is a 62 year old male admitted on 11/7/2022. He presented to the emergency department with at least 4 days of abdominal pain and 2 days without any bowel movement.  He was found to have high-grade small bowel obstruction near site of right nephrectomy earlier this year.    Patient stable overnight.  Continues to have significant NG output.  Patient continues to pass flatus but given lack of resolution with 48 hours conservative management is unlikely to spontaneously resolved.    I agree with Dr. Blair that the benefits outweigh the risks of laparoscopic relief of small bowel obstruction    Principal Problem:    Small bowel obstruction (H)    Assessment: No history of previous bowel obstructions.  NG continues to produce thick output.  Symptoms significantly improved.    Plan: Continue IV fluids at 100 mL/h.  Continue pain management.  Laparoscopy later today.    Active Problems:    Stage 3a chronic kidney disease (H)    Assessment: Patient with creatinine 1.30 which is baseline    Plan: Monitor, recheck in a.m.           Diet: NPO per Anesthesia Guidelines for Procedure/Surgery Except for: Meds    DVT Prophylaxis: Pneumatic Compression Devices  Lewis Catheter: Not present  Central Lines: None  Cardiac Monitoring: None  Code Status: Full Code      Disposition Plan     Expected Discharge Date: 11/10/2022      Destination: home          The patient's care was discussed with the Patient.    Frank Rush MD  Hospitalist Service  [facility]  Securely message with the Vocera Web Console (learn more here)  Text page via [employer] Paging/Directory         Clinically Significant Risk Factors                       # Overweight: Estimated body mass index is 28.3 kg/m  as calculated from the following:    Height as of this encounter: 1.778 m (5'  "10\").    Weight as of this encounter: 89.4 kg (197 lb 3.2 oz)., PRESENT ON ADMISSION         ______________________________________________________________________    Interval History   Patient reports pain is significantly improved since admission, really has not changed since yesterday though.  No nausea.  Tolerating NG.  Still having significant output.  He is still passing gas but reports perhaps a little less than yesterday.  No bowel movement.    Patient denies any chest pain or shortness of breath.  No anginal equivalents.  No fevers.  No new issues.    Data reviewed today: I reviewed all medications, new labs and imaging results over the last 24 hours. I personally reviewed no images or EKG's today.    Physical Exam   Vital Signs: Temp: 98.8  F (37.1  C) Temp src: Tympanic BP: (!) 146/97 Pulse: 71   Resp: 20 SpO2: 95 % O2 Device: None (Room air)    Weight: 197 lbs 3.2 oz  Constitutional: awake, alert, cooperative, no apparent distress, and appears stated age  Respiratory: No increased work of breathing, good air exchange, clear to auscultation bilaterally, no crackles or wheezing  Cardiovascular: Regularrate and rhythm.  No murmurs rubs or gallops heard.   GI: Abdomen is soft, no distention.  Mild tenderness with palpation in lower abdomen.  No rebound or guarding..  Bowel sounds heard but slightly hypoactive.  Musculoskeletal: No synovitis.  Muscle strength age and body habitus appropriateas well as equal and even.   Neuropsychiatric: General: normal, calm and normal eye contact  Orientation: oriented to self, place, time and situation  Memory and insight: memory for past and recent events intact and thought process normal    Data   Recent Labs   Lab 11/10/22  0618 11/09/22  0530 11/08/22  0435 11/07/22  2120   WBC 6.2 4.6 4.6 4.2   HGB 13.7 15.4 16.3 16.8   MCV 89 88 89 87    245 250 277    139 135 134   POTASSIUM 4.0 4.4 4.6 4.0   CHLORIDE 104 103 99 99   CO2 25 28 30 26   BUN 19.1 23.3* 26* " 27*   CR 1.30* 1.41* 1.59* 1.66*   ANIONGAP 11 8 6 9   SANDY 8.9 9.1 9.7 9.9   GLC 78 99 109* 111*   ALBUMIN 3.5 3.7 4.3 4.3   PROTTOTAL 6.1* 6.6 7.3 7.9   BILITOTAL 0.7 0.9 0.8 0.8   ALKPHOS 92 97 82 82   ALT 20 25 23 25   AST 18 19 18 18   LIPASE  --   --   --  12     No results found for this or any previous visit (from the past 24 hour(s)).

## 2022-11-11 PROBLEM — N18.31 STAGE 3A CHRONIC KIDNEY DISEASE (H): Status: RESOLVED | Noted: 2022-11-08 | Resolved: 2022-11-11

## 2022-11-11 LAB
ANION GAP SERPL CALCULATED.3IONS-SCNC: 8 MMOL/L (ref 7–15)
BUN SERPL-MCNC: 25.6 MG/DL (ref 8–23)
CALCIUM SERPL-MCNC: 7.8 MG/DL (ref 8.8–10.2)
CHLORIDE SERPL-SCNC: 101 MMOL/L (ref 98–107)
CREAT SERPL-MCNC: 1.8 MG/DL (ref 0.67–1.17)
DEPRECATED HCO3 PLAS-SCNC: 25 MMOL/L (ref 22–29)
ERYTHROCYTE [DISTWIDTH] IN BLOOD BY AUTOMATED COUNT: 12.5 % (ref 10–15)
GFR SERPL CREATININE-BSD FRML MDRD: 42 ML/MIN/1.73M2
GLUCOSE BLDC GLUCOMTR-MCNC: 208 MG/DL (ref 70–99)
GLUCOSE SERPL-MCNC: 225 MG/DL (ref 70–99)
HCT VFR BLD AUTO: 37.2 % (ref 40–53)
HGB BLD-MCNC: 12.7 G/DL (ref 13.3–17.7)
HOLD SPECIMEN: NORMAL
HOLD SPECIMEN: NORMAL
MCH RBC QN AUTO: 30.8 PG (ref 26.5–33)
MCHC RBC AUTO-ENTMCNC: 34.1 G/DL (ref 31.5–36.5)
MCV RBC AUTO: 90 FL (ref 78–100)
PLATELET # BLD AUTO: 249 10E3/UL (ref 150–450)
POTASSIUM SERPL-SCNC: 4.9 MMOL/L (ref 3.4–5.3)
RBC # BLD AUTO: 4.13 10E6/UL (ref 4.4–5.9)
SODIUM SERPL-SCNC: 134 MMOL/L (ref 136–145)
WBC # BLD AUTO: 9.2 10E3/UL (ref 4–11)

## 2022-11-11 PROCEDURE — 82310 ASSAY OF CALCIUM: CPT | Performed by: SURGERY

## 2022-11-11 PROCEDURE — 36415 COLL VENOUS BLD VENIPUNCTURE: CPT | Performed by: SURGERY

## 2022-11-11 PROCEDURE — 250N000011 HC RX IP 250 OP 636: Performed by: SURGERY

## 2022-11-11 PROCEDURE — 258N000002 HC RX IP 258 OP 250: Performed by: SURGERY

## 2022-11-11 PROCEDURE — 99024 POSTOP FOLLOW-UP VISIT: CPT | Performed by: SURGERY

## 2022-11-11 PROCEDURE — 99231 SBSQ HOSP IP/OBS SF/LOW 25: CPT | Performed by: FAMILY MEDICINE

## 2022-11-11 PROCEDURE — 85027 COMPLETE CBC AUTOMATED: CPT | Performed by: SURGERY

## 2022-11-11 PROCEDURE — 999N000157 HC STATISTIC RCP TIME EA 10 MIN

## 2022-11-11 PROCEDURE — 120N000001 HC R&B MED SURG/OB

## 2022-11-11 PROCEDURE — 258N000003 HC RX IP 258 OP 636: Performed by: SURGERY

## 2022-11-11 PROCEDURE — 250N000013 HC RX MED GY IP 250 OP 250 PS 637: Performed by: INTERNAL MEDICINE

## 2022-11-11 PROCEDURE — C9113 INJ PANTOPRAZOLE SODIUM, VIA: HCPCS | Performed by: SURGERY

## 2022-11-11 PROCEDURE — 82947 ASSAY GLUCOSE BLOOD QUANT: CPT | Performed by: SURGERY

## 2022-11-11 RX ORDER — SODIUM CHLORIDE 450 MG/100ML
INJECTION, SOLUTION INTRAVENOUS CONTINUOUS
Status: DISCONTINUED | OUTPATIENT
Start: 2022-11-11 | End: 2022-11-12

## 2022-11-11 RX ADMIN — HYDROMORPHONE HYDROCHLORIDE 0.4 MG: 0.2 INJECTION, SOLUTION INTRAMUSCULAR; INTRAVENOUS; SUBCUTANEOUS at 04:30

## 2022-11-11 RX ADMIN — HYDROMORPHONE HYDROCHLORIDE 0.4 MG: 0.2 INJECTION, SOLUTION INTRAMUSCULAR; INTRAVENOUS; SUBCUTANEOUS at 19:54

## 2022-11-11 RX ADMIN — ACETAMINOPHEN 1000 MG: 10 INJECTION, SOLUTION INTRAVENOUS at 03:27

## 2022-11-11 RX ADMIN — SODIUM CHLORIDE, POTASSIUM CHLORIDE, SODIUM LACTATE AND CALCIUM CHLORIDE 1000 ML: 600; 310; 30; 20 INJECTION, SOLUTION INTRAVENOUS at 08:23

## 2022-11-11 RX ADMIN — HYDROMORPHONE HYDROCHLORIDE 0.4 MG: 0.2 INJECTION, SOLUTION INTRAMUSCULAR; INTRAVENOUS; SUBCUTANEOUS at 08:11

## 2022-11-11 RX ADMIN — ACETAMINOPHEN 1000 MG: 10 INJECTION, SOLUTION INTRAVENOUS at 16:13

## 2022-11-11 RX ADMIN — ACETAMINOPHEN 1000 MG: 10 INJECTION, SOLUTION INTRAVENOUS at 21:41

## 2022-11-11 RX ADMIN — ENOXAPARIN SODIUM 40 MG: 40 INJECTION SUBCUTANEOUS at 12:25

## 2022-11-11 RX ADMIN — SODIUM CHLORIDE: 4.5 INJECTION, SOLUTION INTRAVENOUS at 10:30

## 2022-11-11 RX ADMIN — PANTOPRAZOLE SODIUM 40 MG: 40 INJECTION, POWDER, FOR SOLUTION INTRAVENOUS at 08:28

## 2022-11-11 RX ADMIN — HYDROMORPHONE HYDROCHLORIDE 0.4 MG: 0.2 INJECTION, SOLUTION INTRAMUSCULAR; INTRAVENOUS; SUBCUTANEOUS at 23:35

## 2022-11-11 RX ADMIN — SODIUM CHLORIDE: 4.5 INJECTION, SOLUTION INTRAVENOUS at 19:26

## 2022-11-11 RX ADMIN — HYDROMORPHONE HYDROCHLORIDE 0.4 MG: 0.2 INJECTION, SOLUTION INTRAMUSCULAR; INTRAVENOUS; SUBCUTANEOUS at 12:23

## 2022-11-11 RX ADMIN — Medication 1 ML: at 21:43

## 2022-11-11 RX ADMIN — HYDROMORPHONE HYDROCHLORIDE 0.4 MG: 0.2 INJECTION, SOLUTION INTRAMUSCULAR; INTRAVENOUS; SUBCUTANEOUS at 16:11

## 2022-11-11 RX ADMIN — ACETAMINOPHEN 1000 MG: 10 INJECTION, SOLUTION INTRAVENOUS at 10:32

## 2022-11-11 ASSESSMENT — ACTIVITIES OF DAILY LIVING (ADL)
ADLS_ACUITY_SCORE: 23
ADLS_ACUITY_SCORE: 23
ADLS_ACUITY_SCORE: 22
ADLS_ACUITY_SCORE: 23
ADLS_ACUITY_SCORE: 22
ADLS_ACUITY_SCORE: 23

## 2022-11-11 NOTE — OR NURSING
"PACU Transfer Note    Julius Hansen was transferred to [facility] room 318 via bed.  Equipment used for transport:  Oxygen 3L NC.  Accompanied by:  Kortney YEH RN & Aman REDDY CRNA  Prescriptions were: none at this time    PACU Respiratory Event Documentation     1) Episodes of Apnea greater than or equal to 10 seconds: 0     2) Bradypnea - less than 8 breaths per minute: 0    3) Pain score on 0 to 10 scale: states \"this is the best I've felt\"    4) Pain-sedation mismatch (yes or no): no    5) Repeated 02 desaturation less than 90% (yes or no): no, did have some desaturation but monitored patient until patient able to maintain sats above 90% for 30 minutes    Anesthesia notified? (yes or no): with RN in PACU    Any of the above events occuring repeatedly in separate 30 minute intervals may be considered recurrent PACU respiratory events.    Patient stable and meets phase 1 discharge criteria for transport from PACU.    "

## 2022-11-11 NOTE — PROGRESS NOTES
"Surgical Progress Note     POD# 1 s/p Right hemicolectomy    Subjective: OR findings discussed with patient. Has some incisional pain.     Objective:  BP (!) 134/100 (BP Location: Left arm, Patient Position: Supine)   Pulse 80   Temp 98.9  F (37.2  C) (Tympanic)   Resp 22   Ht 1.778 m (5' 10\")   Wt 89.4 kg (197 lb 3.2 oz)   SpO2 97%   BMI 28.30 kg/m      U/O: 250 mL last shift  N mL last shift    ABDOMEN: Dressing clean and dry. Soft and non-tender.    LABS  Recent Labs   Lab Test 11/11/22  0619 11/10/22  0618 21  0505 17  1500 17  0759   WBC 9.2 6.2   < >  --   --    RBC 4.13* 4.50   < >  --   --    HGB 12.7* 13.7   < > 14.0 14.6   HCT 37.2* 40.1   < > 41.1 42.8   MCV 90 89   < > 85 85   MCH 30.8 30.4   < > 28.8 29.0   MCHC 34.1 34.2   < > 34.1 34.1    232   < > 226 247   MPV  --   --   --  8.2 8.4    < > = values in this interval not displayed.       Recent Labs   Lab Test 11/11/22  0619 11/10/22  0618   POTASSIUM 4.9 4.0   CHLORIDE 101 104   BUN 25.6* 19.1       Recent Labs   Lab Test 11/10/22  0618 11/09/22  0530 21  1635 21  1622 21  1611 21  0637   PROTEIN  --   --   --  30*  --  300*   BILITOTAL 0.7 0.9   < >  --    < >  --    AST 18 19   < >  --    < >  --    ALT 20 25   < >  --    < >  --     < > = values in this interval not displayed.     Cr 1.8 up from 1.3      ASSESSMENT  Doing well after right hemicolectomy for small bowel obstruction due to intraabdominal metastasis. Needs some more fluid with increase in BUN/CR. Was severely dehydrated from NGT losses pre-op.      PLAN  Bolus IVF  Pain: Ofirmev/Dilaudid  Nutrition: NPO.   Ulcer prophylaxis: Protonix  DVT prophylaxis : Lovenox and SCD's  Lewis: out tomorrow  Dr Tran will be covering for me this weekend    Mirza Dumont MD      "

## 2022-11-11 NOTE — PROGRESS NOTES
Dilaudid 0.4 mg IV given for pain of 8-10. Relief to 6.   Lewis to be removed in the morning.  Ice chips.  Big ice pack to midline incision.  NG is running at low continuous as per Dr Dumont.      SAFETY CHECKLIST  ID Bands and Risk clasps correct and in place (DNR, Fall risk, Allergy, Latex, Limb):  Yes  All Lines Reconciled and labeled correctly: Yes  Whiteboard updated:Yes  Environmental interventions: Yes       Verify Tele #:

## 2022-11-11 NOTE — PROGRESS NOTES
"[facility]    Medicine Progress Note - Hospitalist Service    Date of Admission:  11/7/2022    Assessment & Plan   Principal Problem:    Small bowel obstruction (H)    Assessment: POD1 s/p hemicolectomy    Plan: per surgery. Currently has NG tube   Dilaudid and acetaminophen IV    Active Problems:    Urothelial cancer (H)    Assessment: appears to have diffuse metastatic abdominal urotheial cancer. Pathology pending.    Plan: Arrange oncology follow up at discharge      Chronic kidney disease (CKD) stage G3a/A1, moderately decreased glomerular filtration rate (GFR) between 45-59 mL/min/1.73 square meter and albuminuria creatinine ratio less than 30 mg/g (H)    Assessment: right nephrectomy in March 2022. Baseline creatinine around 1.5, GFR 50. Values 1.8/42 after surgery. IVR and recheck       Diet: NPO for Medical/Clinical Reasons Except for: Ice Chips    DVT Prophylaxis: Enoxaparin (Lovenox) SQ and Pneumatic Compression Devices  Lewis Catheter: PRESENT, indication: /GI/GYN Pelvic Procedure  Central Lines: None  Cardiac Monitoring: None  Code Status: Full Code      Disposition Plan     Expected Discharge Date: 11/16/2022      Destination: home          The patient's care was discussed with the Patient.    Roni Thomason MD  Hospitalist Service  [facility]  Securely message with the Vocera Web Console (learn more here)  Text page via [employer] Paging/Directory         Clinically Significant Risk Factors         # Hyponatremia: Lowest Na = 134 mmol/L (Ref range: 136-145) in last 2 days, will monitor as appropriate  # Hypocalcemia: Lowest Ca = 7.8 mg/dL (Ref range: 8.5 - 10.1 mg/dL) in last 2 days, will monitor and replace as appropriate              # Overweight: Estimated body mass index is 28.3 kg/m  as calculated from the following:    Height as of this encounter: 1.778 m (5' 10\").    Weight as of this encounter: 89.4 kg (197 lb 3.2 oz)., PRESENT ON ADMISSION     "     ______________________________________________________________________    Interval History   Having a lot of pain currently after using IS. Hurts to move. Eating ice chips. No CP or SOB    Data reviewed today: I reviewed all medications, new labs and imaging results over the last 24 hours. I personally reviewed no images or EKG's today.    Physical Exam   Vital Signs: Temp: 99  F (37.2  C) Temp src: Tympanic BP: 108/76 Pulse: 93   Resp: 20 SpO2: 98 % O2 Device: Nasal cannula Oxygen Delivery: 2 LPM  Weight: 197 lbs 3.2 oz  General Appearance: Alert. No acute distress  Chest/Respiratory Exam: Decreased breath sounsd. Clear to auscultation bilaterally  Cardiovascular Exam: Regular rate and rhythm. S1, S2, no murmur, gallop, or rubs.  Abdomen: Bandage over incision. Not palpated due to tenderness  Extremities: 2+ pedal pulses.  No lower extremity edema.  Psychiatric: Normal affect and mentation        Data   Recent Labs   Lab 11/11/22  0622 11/11/22  0619 11/10/22  0902 11/10/22  0618 11/09/22  0530 11/08/22  0435 11/07/22  2120   WBC  --  9.2  --  6.2 4.6   < > 4.2   HGB  --  12.7*  --  13.7 15.4   < > 16.8   MCV  --  90  --  89 88   < > 87   PLT  --  249  --  232 245   < > 277   NA  --  134*  --  140 139   < > 134   POTASSIUM  --  4.9  --  4.0 4.4   < > 4.0   CHLORIDE  --  101  --  104 103   < > 99   CO2  --  25  --  25 28   < > 26   BUN  --  25.6*  --  19.1 23.3*   < > 27*   CR  --  1.80*  --  1.30* 1.41*   < > 1.66*   ANIONGAP  --  8  --  11 8   < > 9   SANDY  --  7.8*  --  8.9 9.1   < > 9.9   * 225* 75 78 99   < > 111*   ALBUMIN  --   --   --  3.5 3.7   < > 4.3   PROTTOTAL  --   --   --  6.1* 6.6   < > 7.9   BILITOTAL  --   --   --  0.7 0.9   < > 0.8   ALKPHOS  --   --   --  92 97   < > 82   ALT  --   --   --  20 25   < > 25   AST  --   --   --  18 19   < > 18   LIPASE  --   --   --   --   --   --  12    < > = values in this interval not displayed.

## 2022-11-11 NOTE — OR NURSING
Pt complains of pain and itching intermittently. Sleeps and easily arousable. Orientated x4. Visteril given and patient resting more comfortably now.    Kortney Yarbrough RN on 11/10/2022 at 7:10 PM

## 2022-11-11 NOTE — OP NOTE
Procedure Date: 11/10/2022    PREOPERATIVE DIAGNOSIS:  Small-bowel obstruction.    POSTOPERATIVE DIAGNOSIS:  Small-bowel obstruction.  Likely due to metastatic urothelial carcinoma.    PROCEDURE:  Relief of small-bowel obstruction, right hemicolectomy.    SURGEON:  Mirza Dumont MD    ASSISTANT:  Astrid Jones, RN and Uriah Brock, RN    ANESTHESIA:  General, Aman Norwood CRNA.    INDICATIONS FOR PROCEDURE:  The patient is a 62-year-old man who 8 months ago underwent a robotic right nephrectomy for urothelial carcinoma.  The patient presented with abdominal distention, nausea and vomiting.  A CT scan was consistent with a high-grade distal small-bowel obstruction.  The patient was treated nonoperatively with nasogastric decompression and IV hydration.  Gastrografin was not available due to a shortage and so he was observed for 48 hours, at which point he continued to have excessive NG losses, although his abdomen was much softer and less distended.  He did not have return of bowel function and exploratory laparotomy was recommended and he was in agreement.    DESCRIPTION OF PROCEDURE:  After adequate general anesthesia, the patient was prepped and draped in the usual sterile fashion.  A midline incision was made from just above the umbilicus to just below the umbilicus.  The incision was carried down through the subcutaneous tissues and the abdomen entered through the linea alba.  There was a distended small bowel throughout the abdomen.  The incision needed to be extended to a point above the symphysis and also superiorly through the old upper midline incision extraction point of the kidney.  In that wound was a hard oblong mass.  This was excised.  This is likely a calcified scar, but the concern was that there might be a tumor.  The small bowel was delivered into the wound.  It was a diffusely distended from the ligament of Treitz to just before the ileocecal valve.  At that point, the terminal ileum was adherent to  the right pelvic brim and there was a hard mass.  The right colon was palpated.  There were additional hard masses along the right gutter.  The omentum was densely adherent to one of the hard masses.  This omentum was divided and left.  The omentum was freed, leaving the adherent omentum with the ventral specimen.  The right colon was mobilized by incising the lateral gutter, staying just beyond the hard masses that were consistent with metastatic tumor.  There appeared to be a nerve adherent to the terminal ileal mass and that was dissected off of the nerve tissue and the terminal ileum was then freed.  The appendix was quite long.  It was also adherent to that area that was freed.  Once the right colon was mobilized, the masses were clearly around the colon as well and so we proceeded with a right hemicolectomy.  The freeing of the hepatic flexure was quite difficult as the hepatic flexure was quite posterior from the nephrectomy.  The omentum was taken off of the transverse colon.  This was then followed up to the hepatic flexure as well as from the ascending colon and that eventually was able to be freed.  A convenient spot on the transverse colon with a good blood vessel was dissected free circumferentially and divided with a NAHEED stapler.  In a similar fashion just proximal to the mass and the terminal ileum, the small bowel was divided with the NAHEED stapler.  The mesentery was then serially clamped, divided, and ligated with 2-0 Vicryl sutures and this was done just beyond the palpable tumor masses.  The specimen was removed. A side-to-side stapled anastomosis was performed in the usual fashion with the NAHEED stapler and the TX-60.  However, it appeared to be quite short and possibly twisted due to the weight of the distended small bowel and it was then resected.  There was spillage of small bowel contents during that anastomosis.  The right gutter was suctioned and irrigated until clear.  Again, a side-to-side  stapled anastomosis was performed with the NAHEED stapler and the TX-60.  The mesenteric defect was closed with a 3-0 Vicryl suture.  This appeared to be a nice anastomosis and a couple of crotch sutures of 2-0 silk were placed to keep the heavy terminal ileum in line with the colon.  The abdomen was irrigated with a couple more liters of normal saline.  There was good hemostasis.  The omentum was laid over the anastomosis and over the small bowel.  The abdominal wall was infiltrated with 0.25% Marcaine plain in a TAP block fashion.  The abdomen was then closed with running 0 looped Maxon.  The subcutaneous tissue approximated with a running 2-0 Vicryl suture and the skin closed with a 4-0 Monocryl intracuticular suture.  Proxy strips clean, dry dressings were applied and the patient was taken to recovery room in stable condition.    Mirza Dumont MD        D: 11/10/2022   T: 11/10/2022   MT: SPMT    Name:     HODA LALATammy  MRN:      [id number]        Account:        [id number]   :      1960           Procedure Date: 11/10/2022     Document: [id number]

## 2022-11-11 NOTE — ANESTHESIA POSTPROCEDURE EVALUATION
Patient: Julius Hansen    Procedure: Procedure(s):  LAPAROTOMY WITH RELIEF OF SMALL BOWEL OBSTRUCTION CONVERTED TO RIGHT HEMICOLECTOMY       Anesthesia Type:  General    Note:  Disposition: Inpatient   Postop Pain Control: Uneventful            Sign Out: Well controlled pain   PONV: No   Neuro/Psych: Uneventful            Sign Out: Acceptable/Baseline neuro status   Airway/Respiratory: Uneventful            Sign Out: Acceptable/Baseline resp. status   CV/Hemodynamics: Uneventful            Sign Out: Acceptable CV status; No obvious hypovolemia; No obvious fluid overload   Other NRE: NONE   DID A NON-ROUTINE EVENT OCCUR? No           Last vitals:  Vitals Value Taken Time   /84 11/10/22 1825   Temp 98.6  F (37  C) 11/10/22 1825   Pulse 92 11/10/22 1829   Resp 4 11/10/22 1829   SpO2 86 % 11/10/22 1829   Vitals shown include unvalidated device data.    Electronically Signed By: GABRIEL ARSHAD CRNA  November 10, 2022  6:30 PM

## 2022-11-11 NOTE — PROGRESS NOTES
Assumed care 1500      SAFETY CHECKLIST  ID Bands and Risk clasps correct and in place (DNR, Fall risk, Allergy, Latex, Limb):  Yes  All Lines Reconciled and labeled correctly: Yes  Whiteboard updated:Yes  Environmental interventions: Yes - call light, bed low, bed rails x 2, light within reach  Verify Tele #: NA

## 2022-11-11 NOTE — PLAN OF CARE
"Patient is POD 1 right hemicolectomy. VSS. Acute O2 use 2 LPM. Capno in place. Lewis patent. Dressing intact with some small amount of shadowing. Ice used and PRN Diluadid. Denied nausea. NG in place and patent with 300 ml output. Continues to have ice chips. He has had some itching but has not needed medical intervention. Patient has not been out bed yet at this time but he has been repositioning himself in bed. Pain being managed with PRN Dilaudid      Problem: Plan of Care - These are the overarching goals to be used throughout the patient stay.    Goal: Plan of Care Review  Description: The Plan of Care Review/Shift note should be completed every shift.  The Outcome Evaluation is a brief statement about your assessment that the patient is improving, declining, or no change.  This information will be displayed automatically on your shift note.  Outcome: Progressing  Goal: Patient-Specific Goal (Individualized)  Description: You can add care plan individualizations to a care plan. Examples of Individualization might be:  \"Parent requests to be called daily at 9am for status\", \"I have a hard time hearing out of my right ear\", or \"Do not touch me to wake me up as it startles me\".  Outcome: Progressing  Goal: Absence of Hospital-Acquired Illness or Injury  Outcome: Progressing  Intervention: Identify and Manage Fall Risk  Recent Flowsheet Documentation  Taken 11/11/2022 0426 by Kathleen Hendricks RN  Safety Promotion/Fall Prevention:   activity supervised   assistive device/personal items within reach   bed alarm on   clutter free environment maintained   fall prevention program maintained   increased rounding and observation   increase visualization of patient   nonskid shoes/slippers when out of bed   patient and family education   safety round/check completed   supervised activity   treat reversible contributory factors   treat underlying cause  Taken 11/10/2022 1945 by Kathleen Hendricks, RN  Safety " Promotion/Fall Prevention:   activity supervised   assistive device/personal items within reach   bed alarm on   clutter free environment maintained   fall prevention program maintained   increased rounding and observation   increase visualization of patient   nonskid shoes/slippers when out of bed   patient and family education   safety round/check completed   supervised activity   treat reversible contributory factors   treat underlying cause  Intervention: Prevent Skin Injury  Recent Flowsheet Documentation  Taken 11/11/2022 0426 by Kathleen Hendricks RN  Body Position: position changed independently  Intervention: Prevent and Manage VTE (Venous Thromboembolism) Risk  Recent Flowsheet Documentation  Taken 11/11/2022 0426 by Kathleen Hendricks RN  VTE Prevention/Management: SCDs (sequential compression devices) on  Taken 11/10/2022 1945 by Kathleen Hendricks RN  VTE Prevention/Management: SCDs (sequential compression devices) on  Goal: Optimal Comfort and Wellbeing  Outcome: Progressing  Intervention: Monitor Pain and Promote Comfort  Recent Flowsheet Documentation  Taken 11/11/2022 0426 by Kathleen Hendricks RN  Pain Management Interventions:   cold applied   medication (see MAR)  Taken 11/10/2022 1945 by Kathleen Hendricks RN  Pain Management Interventions: cold applied  Goal: Readiness for Transition of Care  Outcome: Progressing     Problem: Nausea and Vomiting  Goal: Nausea and Vomiting Relief  Outcome: Progressing  Intervention: Prevent and Manage Nausea and Vomiting  Recent Flowsheet Documentation  Taken 11/11/2022 0426 by Kathleen Hendricks RN  Nausea/Vomiting Interventions: nasogastric tube to suction  Taken 11/10/2022 1945 by Kathleen Hendricks RN  Nausea/Vomiting Interventions: nasogastric tube to suction     Problem: Pain Acute  Goal: Optimal Pain Control and Function  Outcome: Progressing  Intervention: Develop Pain Management Plan  Recent Flowsheet Documentation  Taken 11/11/2022 0426  by Kathleen Hendricks RN  Pain Management Interventions:   cold applied   medication (see MAR)  Taken 11/10/2022 1945 by Kathleen Hendricks RN  Pain Management Interventions: cold applied  Intervention: Prevent or Manage Pain  Recent Flowsheet Documentation  Taken 11/11/2022 0426 by Kathleen Hendricks RN  Medication Review/Management: medications reviewed  Taken 11/10/2022 1945 by Kathleen Hendricks RN  Medication Review/Management: medications reviewed     Problem: Bowel Resection  Goal: Absence of Bleeding  Outcome: Progressing  Goal: Effective Bowel Motility and Elimination  Outcome: Progressing  Goal: Fluid and Electrolyte Balance  Outcome: Progressing  Goal: Absence of Infection Signs and Symptoms  Outcome: Progressing  Goal: Fluid, Electrolyte and Nutrition Balance  Outcome: Progressing  Goal: Anesthesia/Sedation Recovery  Outcome: Progressing  Intervention: Optimize Anesthesia Recovery  Recent Flowsheet Documentation  Taken 11/11/2022 0426 by Kathleen Hendricks RN  Safety Promotion/Fall Prevention:   activity supervised   assistive device/personal items within reach   bed alarm on   clutter free environment maintained   fall prevention program maintained   increased rounding and observation   increase visualization of patient   nonskid shoes/slippers when out of bed   patient and family education   safety round/check completed   supervised activity   treat reversible contributory factors   treat underlying cause  Taken 11/10/2022 1945 by Kathleen Hendricks RN  Safety Promotion/Fall Prevention:   activity supervised   assistive device/personal items within reach   bed alarm on   clutter free environment maintained   fall prevention program maintained   increased rounding and observation   increase visualization of patient   nonskid shoes/slippers when out of bed   patient and family education   safety round/check completed   supervised activity   treat reversible contributory factors   treat  underlying cause  Goal: Acceptable Pain Control  Outcome: Progressing  Intervention: Prevent or Manage Pain  Recent Flowsheet Documentation  Taken 11/11/2022 0426 by Kathleen Hendricks RN  Pain Management Interventions:   cold applied   medication (see MAR)  Taken 11/10/2022 1945 by Kathleen Hendricks RN  Pain Management Interventions: cold applied  Goal: Nausea and Vomiting Relief  Outcome: Progressing  Intervention: Prevent or Manage Nausea and Vomiting  Recent Flowsheet Documentation  Taken 11/11/2022 0426 by Kathleen Hendricks RN  Nausea/Vomiting Interventions: nasogastric tube to suction  Taken 11/10/2022 1945 by Kathleen Hendricks RN  Nausea/Vomiting Interventions: nasogastric tube to suction  Goal: Effective Urinary Elimination  Outcome: Progressing  Goal: Effective Oxygenation and Ventilation  Outcome: Progressing   Goal Outcome Evaluation:

## 2022-11-11 NOTE — PHARMACY
Pharmacy - Transfer Medication Reconciliation     The patient's transfer medication orders have been compared to the medication administration record and to the Prior to Admissions Medications list - any noted discrepancies were resolved with the MD.     Thank you. Pharmacy will continue to monitor.     Lori Padilla [facility] ....................  11/11/2022   8:45 AM

## 2022-11-11 NOTE — PROGRESS NOTES
[facility] TRANSPORT NOTE  Data:   Reason for Transport:  Post surgical    Julius Hansen was transported to [facility] via bed at 1940.  Patient was accompanied by Registered Nurse and CRNA. Equipment used for transport: Oxygen  Nasal Cannula, IV pump and Urine monitor. Family was aware of reason for transport: yes    Action:  Report: received from GARETH Sheets    Response:  Patient's condition upon return was stable.    Kathleen Hendricks RN

## 2022-11-12 LAB
ANION GAP SERPL CALCULATED.3IONS-SCNC: 6 MMOL/L (ref 7–15)
BUN SERPL-MCNC: 19.1 MG/DL (ref 8–23)
CALCIUM SERPL-MCNC: 7.7 MG/DL (ref 8.8–10.2)
CHLORIDE SERPL-SCNC: 101 MMOL/L (ref 98–107)
CREAT SERPL-MCNC: 1.4 MG/DL (ref 0.67–1.17)
DEPRECATED HCO3 PLAS-SCNC: 25 MMOL/L (ref 22–29)
GFR SERPL CREATININE-BSD FRML MDRD: 57 ML/MIN/1.73M2
GLUCOSE SERPL-MCNC: 96 MG/DL (ref 70–99)
POTASSIUM SERPL-SCNC: 3.8 MMOL/L (ref 3.4–5.3)
SODIUM SERPL-SCNC: 132 MMOL/L (ref 136–145)

## 2022-11-12 PROCEDURE — 250N000011 HC RX IP 250 OP 636: Performed by: SURGERY

## 2022-11-12 PROCEDURE — 258N000003 HC RX IP 258 OP 636: Performed by: SURGERY

## 2022-11-12 PROCEDURE — 36415 COLL VENOUS BLD VENIPUNCTURE: CPT | Performed by: SURGERY

## 2022-11-12 PROCEDURE — C9113 INJ PANTOPRAZOLE SODIUM, VIA: HCPCS | Performed by: SURGERY

## 2022-11-12 PROCEDURE — 120N000001 HC R&B MED SURG/OB

## 2022-11-12 PROCEDURE — 999N000157 HC STATISTIC RCP TIME EA 10 MIN

## 2022-11-12 PROCEDURE — 80048 BASIC METABOLIC PNL TOTAL CA: CPT | Performed by: SURGERY

## 2022-11-12 RX ORDER — SODIUM CHLORIDE 9 MG/ML
INJECTION, SOLUTION INTRAVENOUS CONTINUOUS
Status: DISCONTINUED | OUTPATIENT
Start: 2022-11-12 | End: 2022-11-16

## 2022-11-12 RX ADMIN — HYDROMORPHONE HYDROCHLORIDE 0.4 MG: 0.2 INJECTION, SOLUTION INTRAMUSCULAR; INTRAVENOUS; SUBCUTANEOUS at 21:27

## 2022-11-12 RX ADMIN — HYDROMORPHONE HYDROCHLORIDE 0.4 MG: 0.2 INJECTION, SOLUTION INTRAMUSCULAR; INTRAVENOUS; SUBCUTANEOUS at 03:55

## 2022-11-12 RX ADMIN — ACETAMINOPHEN 1000 MG: 10 INJECTION, SOLUTION INTRAVENOUS at 03:47

## 2022-11-12 RX ADMIN — ENOXAPARIN SODIUM 40 MG: 40 INJECTION SUBCUTANEOUS at 12:07

## 2022-11-12 RX ADMIN — HYDROMORPHONE HYDROCHLORIDE 0.4 MG: 0.2 INJECTION, SOLUTION INTRAMUSCULAR; INTRAVENOUS; SUBCUTANEOUS at 09:41

## 2022-11-12 RX ADMIN — ACETAMINOPHEN 1000 MG: 10 INJECTION, SOLUTION INTRAVENOUS at 21:27

## 2022-11-12 RX ADMIN — SODIUM CHLORIDE: 9 INJECTION, SOLUTION INTRAVENOUS at 10:27

## 2022-11-12 RX ADMIN — SODIUM CHLORIDE: 9 INJECTION, SOLUTION INTRAVENOUS at 18:37

## 2022-11-12 RX ADMIN — HYDROMORPHONE HYDROCHLORIDE 0.4 MG: 0.2 INJECTION, SOLUTION INTRAMUSCULAR; INTRAVENOUS; SUBCUTANEOUS at 14:34

## 2022-11-12 RX ADMIN — HYDROMORPHONE HYDROCHLORIDE 0.4 MG: 0.2 INJECTION, SOLUTION INTRAMUSCULAR; INTRAVENOUS; SUBCUTANEOUS at 18:40

## 2022-11-12 RX ADMIN — ACETAMINOPHEN 1000 MG: 10 INJECTION, SOLUTION INTRAVENOUS at 10:27

## 2022-11-12 RX ADMIN — ACETAMINOPHEN 1000 MG: 10 INJECTION, SOLUTION INTRAVENOUS at 16:13

## 2022-11-12 RX ADMIN — PANTOPRAZOLE SODIUM 40 MG: 40 INJECTION, POWDER, FOR SOLUTION INTRAVENOUS at 07:30

## 2022-11-12 ASSESSMENT — ACTIVITIES OF DAILY LIVING (ADL)
ADLS_ACUITY_SCORE: 23
ADLS_ACUITY_SCORE: 25
ADLS_ACUITY_SCORE: 25
ADLS_ACUITY_SCORE: 23
ADLS_ACUITY_SCORE: 25
ADLS_ACUITY_SCORE: 23
ADLS_ACUITY_SCORE: 23
ADLS_ACUITY_SCORE: 25
ADLS_ACUITY_SCORE: 23
ADLS_ACUITY_SCORE: 23

## 2022-11-12 NOTE — PLAN OF CARE
"Patient was up to ambulate in the hallway. NG patent, decreased output this shift. Vital signs stable. Iscion line clean and dry, open to air. Has been medicated times one for pain. Has had no further complaints, sleeping between cares.  Problem: Plan of Care - These are the overarching goals to be used throughout the patient stay.    Goal: Plan of Care Review  Description: The Plan of Care Review/Shift note should be completed every shift.  The Outcome Evaluation is a brief statement about your assessment that the patient is improving, declining, or no change.  This information will be displayed automatically on your shift note.  Outcome: Progressing  Flowsheets (Taken 11/12/2022 0855)  Outcome Evaluation: encourage activity  Plan of Care Reviewed With: patient  Overall Patient Progress: improving  Goal: Patient-Specific Goal (Individualized)  Description: You can add care plan individualizations to a care plan. Examples of Individualization might be:  \"Parent requests to be called daily at 9am for status\", \"I have a hard time hearing out of my right ear\", or \"Do not touch me to wake me up as it startles me\".  Outcome: Progressing  Goal: Absence of Hospital-Acquired Illness or Injury  Outcome: Progressing  Intervention: Identify and Manage Fall Risk  Recent Flowsheet Documentation  Taken 11/12/2022 0726 by Madhuri Chacon RN  Safety Promotion/Fall Prevention: safety round/check completed  Intervention: Prevent Skin Injury  Recent Flowsheet Documentation  Taken 11/12/2022 0726 by Madhuri Chacon RN  Body Position: position changed independently  Intervention: Prevent and Manage VTE (Venous Thromboembolism) Risk  Recent Flowsheet Documentation  Taken 11/12/2022 0726 by Madhuri Chacon RN  VTE Prevention/Management: SCDs (sequential compression devices) off  Goal: Optimal Comfort and Wellbeing  Outcome: Progressing  Goal: Readiness for Transition of Care  Outcome: Progressing   Goal Outcome " Evaluation:      Plan of Care Reviewed With: patient    Overall Patient Progress: improvingOverall Patient Progress: improving    Outcome Evaluation: encourage activity

## 2022-11-12 NOTE — PLAN OF CARE
Goal Outcome Evaluation:      Plan of Care Reviewed With: patient    Overall Patient Progress: improvingOverall Patient Progress: improving    Outcome Evaluation: encourage activity  Patient vital signs stable. Activity encouraged. Patient voiding with ni difficulty post mayorga removal. Currently sleeping.

## 2022-11-12 NOTE — PLAN OF CARE
Goal Outcome Evaluation:      Plan of Care Reviewed With: patient    Overall Patient Progress: improvingOverall Patient Progress: improving    Outcome Evaluation: encourage activity

## 2022-11-12 NOTE — PROGRESS NOTES
SAFETY CHECKLIST  ID Bands and Risk clasps correct and in place (DNR, Fall risk, Allergy, Latex, Limb):  Yes  All Lines Reconciled and labeled correctly: Yes  Whiteboard updated:Yes  Environmental interventions: Yes  Verify Tele #:

## 2022-11-12 NOTE — PLAN OF CARE
"Patient is POD 1. VSS. Room Air Afebrile. NG patent. Lewis Patent. Dressing intact with no new shadowing drainage. Up x 1 to the bathroom as thought needed to have a stool. Unsuccessful.  Assist 1. Pain managed with scheduled APAP, PRN Dilaudid and ice      Problem: Plan of Care - These are the overarching goals to be used throughout the patient stay.    Goal: Plan of Care Review  Description: The Plan of Care Review/Shift note should be completed every shift.  The Outcome Evaluation is a brief statement about your assessment that the patient is improving, declining, or no change.  This information will be displayed automatically on your shift note.  Outcome: Progressing  Goal: Patient-Specific Goal (Individualized)  Description: You can add care plan individualizations to a care plan. Examples of Individualization might be:  \"Parent requests to be called daily at 9am for status\", \"I have a hard time hearing out of my right ear\", or \"Do not touch me to wake me up as it startles me\".  Outcome: Progressing  Goal: Absence of Hospital-Acquired Illness or Injury  Outcome: Progressing  Intervention: Identify and Manage Fall Risk  Recent Flowsheet Documentation  Taken 11/11/2022 1626 by Kathleen Hendricks RN  Safety Promotion/Fall Prevention:    activity supervised    assistive device/personal items within reach    bed alarm on    clutter free environment maintained    fall prevention program maintained    increased rounding and observation    increase visualization of patient    nonskid shoes/slippers when out of bed    patient and family education    safety round/check completed    supervised activity    treat reversible contributory factors    treat underlying cause  Intervention: Prevent Skin Injury  Recent Flowsheet Documentation  Taken 11/11/2022 1626 by Kathleen Hendricks RN  Body Position: position changed independently  Intervention: Prevent and Manage VTE (Venous Thromboembolism) Risk  Recent Flowsheet " Documentation  Taken 11/11/2022 1626 by Kathleen Hendricks RN  VTE Prevention/Management:    SCDs (sequential compression devices) off    patient refused intervention  Goal: Optimal Comfort and Wellbeing  Outcome: Progressing  Intervention: Monitor Pain and Promote Comfort  Recent Flowsheet Documentation  Taken 11/11/2022 1626 by Kathleen Hendricks RN  Pain Management Interventions:    medication (see MAR)    cold applied  Goal: Readiness for Transition of Care  Outcome: Progressing     Problem: Nausea and Vomiting  Goal: Nausea and Vomiting Relief  Outcome: Progressing  Intervention: Prevent and Manage Nausea and Vomiting  Recent Flowsheet Documentation  Taken 11/11/2022 1626 by Kathleen Hendricks RN  Nausea/Vomiting Interventions: nasogastric tube to suction     Problem: Pain Acute  Goal: Optimal Pain Control and Function  Outcome: Progressing  Intervention: Develop Pain Management Plan  Recent Flowsheet Documentation  Taken 11/11/2022 1626 by Kathleen Hendricks RN  Pain Management Interventions:    medication (see MAR)    cold applied  Intervention: Prevent or Manage Pain  Recent Flowsheet Documentation  Taken 11/11/2022 1626 by Kathleen Hendricks RN  Medication Review/Management: medications reviewed     Problem: Bowel Resection  Goal: Absence of Bleeding  Outcome: Progressing  Goal: Effective Bowel Motility and Elimination  Outcome: Progressing  Goal: Fluid and Electrolyte Balance  Outcome: Progressing  Goal: Absence of Infection Signs and Symptoms  Outcome: Progressing  Goal: Fluid, Electrolyte and Nutrition Balance  Outcome: Progressing  Goal: Anesthesia/Sedation Recovery  Outcome: Progressing  Intervention: Optimize Anesthesia Recovery  Recent Flowsheet Documentation  Taken 11/11/2022 1626 by Kathleen Hendricks RN  Safety Promotion/Fall Prevention:    activity supervised    assistive device/personal items within reach    bed alarm on    clutter free environment maintained    fall prevention  program maintained    increased rounding and observation    increase visualization of patient    nonskid shoes/slippers when out of bed    patient and family education    safety round/check completed    supervised activity    treat reversible contributory factors    treat underlying cause  Goal: Acceptable Pain Control  Outcome: Progressing  Intervention: Prevent or Manage Pain  Recent Flowsheet Documentation  Taken 11/11/2022 1626 by Kathleen Hendricks RN  Pain Management Interventions:    medication (see MAR)    cold applied  Goal: Nausea and Vomiting Relief  Outcome: Progressing  Intervention: Prevent or Manage Nausea and Vomiting  Recent Flowsheet Documentation  Taken 11/11/2022 1626 by Kathleen Hendricks RN  Nausea/Vomiting Interventions: nasogastric tube to suction  Goal: Effective Urinary Elimination  Outcome: Progressing  Goal: Effective Oxygenation and Ventilation  Outcome: Progressing   Goal Outcome Evaluation:

## 2022-11-12 NOTE — PLAN OF CARE
Took over care of the patient at 0300. VSS. Temp of 100.2. Dilaudid given for abdominal pain with some relief. 400 mls of dark brown/green output from NG. NG remains to low continuous suction per order. Imboden remains at 65 cm. Sticker to nose replaced as it was falling off. Dressing intact, no new shadowing.    Selina Blanca RN on 11/12/2022 at 5:42 AM

## 2022-11-12 NOTE — PROGRESS NOTES
GENERAL SURGERY PROGRESS NOTE  11/12/2022      Interval history:   No acute events overnight.  Patient states pain is moderately well controlled with current regimen.  Denies nausea or vomiting.  Complains of some abdominal distention, states he feels like he needs to have a bowel movement.  Denies flatus.  Denies fevers or chills.  Patient is ambulating in the hallway with assistance    Physical Exam:   Vital signs are reviewed and there are no significant abnormalities.     UOP over past 24 hours is 1250 mL  NGT 750mL    General: Laying in bed, appears uncomfortable  HEENT:  nasogastric tube pulling bilious stomach fluid  Abdomen: Abdomen is soft, appropriate tenderness about the incision.  Mildly distended.  Incision is clean and intact with Steri-Strips, no surrounding erythema or induration.  No active drainage or bleeding  MSK: Normal bulk and tone, no lower extremity edema  Neuro: Alert and oriented, normal speech and mentation    Labs are reviewed and are significant for sodium 132, potassium 3.8, creatinine 1.4    No new imaging       Assessment / Plan:   Julius Hansen is a 62 year old male who is postop day 2 from right hemicolectomy.      Neuro/pain control:  Continue current pain regimen with scheduled IV Tylenol and as needed Dilaudid for breakthrough pain  Pulmonary: Continue aggressive pulmonary toilet  Cardiac/hemodynamics: Vital signs are stable, no concerns  Fluids, electrolytes, renal: Continue IV fluids, creatinine is trending down, urine output is normal, follow labs  Gastrointestinal: Status post right hemicolectomy, awaiting return of bowel function, keep NG tube until signs of bowel function  Endocrine: No concerns  Infectious disease:  Afebrile, no evidence of infection  Hematologic:  Continue Lovenox and SCDs for DVT prophylaxis        CHAITANYA Tran MD   11/12/2022

## 2022-11-13 ENCOUNTER — APPOINTMENT (OUTPATIENT)
Dept: GENERAL RADIOLOGY | Facility: OTHER | Age: 62
End: 2022-11-13
Attending: SURGERY
Payer: COMMERCIAL

## 2022-11-13 LAB
ALBUMIN UR-MCNC: 30 MG/DL
ANION GAP SERPL CALCULATED.3IONS-SCNC: 12 MMOL/L (ref 7–15)
APPEARANCE UR: CLEAR
BILIRUB UR QL STRIP: NEGATIVE
BUN SERPL-MCNC: 12.2 MG/DL (ref 8–23)
CALCIUM SERPL-MCNC: 8.1 MG/DL (ref 8.8–10.2)
CHLORIDE SERPL-SCNC: 101 MMOL/L (ref 98–107)
COLOR UR AUTO: YELLOW
CREAT SERPL-MCNC: 1.16 MG/DL (ref 0.67–1.17)
DEPRECATED HCO3 PLAS-SCNC: 22 MMOL/L (ref 22–29)
ERYTHROCYTE [DISTWIDTH] IN BLOOD BY AUTOMATED COUNT: 12.8 % (ref 10–15)
GFR SERPL CREATININE-BSD FRML MDRD: 71 ML/MIN/1.73M2
GLUCOSE BLDC GLUCOMTR-MCNC: 77 MG/DL (ref 70–99)
GLUCOSE SERPL-MCNC: 78 MG/DL (ref 70–99)
GLUCOSE UR STRIP-MCNC: NEGATIVE MG/DL
HCT VFR BLD AUTO: 31.2 % (ref 40–53)
HGB BLD-MCNC: 10.4 G/DL (ref 13.3–17.7)
HGB UR QL STRIP: NEGATIVE
HOLD SPECIMEN: NORMAL
KETONES UR STRIP-MCNC: >150 MG/DL
LEUKOCYTE ESTERASE UR QL STRIP: NEGATIVE
MAGNESIUM SERPL-MCNC: 1.8 MG/DL (ref 1.7–2.3)
MCH RBC QN AUTO: 30.2 PG (ref 26.5–33)
MCHC RBC AUTO-ENTMCNC: 33.3 G/DL (ref 31.5–36.5)
MCV RBC AUTO: 91 FL (ref 78–100)
NITRATE UR QL: NEGATIVE
PH UR STRIP: 6 [PH] (ref 5–9)
PHOSPHATE SERPL-MCNC: 2.2 MG/DL (ref 2.5–4.5)
PLATELET # BLD AUTO: 248 10E3/UL (ref 150–450)
POTASSIUM SERPL-SCNC: 3.9 MMOL/L (ref 3.4–5.3)
RBC # BLD AUTO: 3.44 10E6/UL (ref 4.4–5.9)
RBC URINE: 2 /HPF
SODIUM SERPL-SCNC: 135 MMOL/L (ref 136–145)
SP GR UR STRIP: 1.03 (ref 1–1.03)
UROBILINOGEN UR STRIP-MCNC: NORMAL MG/DL
WBC # BLD AUTO: 11.5 10E3/UL (ref 4–11)
WBC URINE: 2 /HPF

## 2022-11-13 PROCEDURE — 999N000157 HC STATISTIC RCP TIME EA 10 MIN

## 2022-11-13 PROCEDURE — 85027 COMPLETE CBC AUTOMATED: CPT | Performed by: SURGERY

## 2022-11-13 PROCEDURE — 250N000011 HC RX IP 250 OP 636: Performed by: SURGERY

## 2022-11-13 PROCEDURE — 120N000001 HC R&B MED SURG/OB

## 2022-11-13 PROCEDURE — C9113 INJ PANTOPRAZOLE SODIUM, VIA: HCPCS | Performed by: SURGERY

## 2022-11-13 PROCEDURE — 81003 URINALYSIS AUTO W/O SCOPE: CPT | Performed by: SURGERY

## 2022-11-13 PROCEDURE — 84100 ASSAY OF PHOSPHORUS: CPT | Performed by: SURGERY

## 2022-11-13 PROCEDURE — 71045 X-RAY EXAM CHEST 1 VIEW: CPT | Mod: TC

## 2022-11-13 PROCEDURE — 83735 ASSAY OF MAGNESIUM: CPT | Performed by: SURGERY

## 2022-11-13 PROCEDURE — 80048 BASIC METABOLIC PNL TOTAL CA: CPT | Performed by: SURGERY

## 2022-11-13 PROCEDURE — 36415 COLL VENOUS BLD VENIPUNCTURE: CPT | Performed by: SURGERY

## 2022-11-13 PROCEDURE — 258N000003 HC RX IP 258 OP 636: Performed by: SURGERY

## 2022-11-13 RX ORDER — HEPARIN SODIUM 5000 [USP'U]/.5ML
5000 INJECTION, SOLUTION INTRAVENOUS; SUBCUTANEOUS EVERY 12 HOURS
Status: DISCONTINUED | OUTPATIENT
Start: 2022-11-13 | End: 2022-11-14

## 2022-11-13 RX ORDER — KETOROLAC TROMETHAMINE 15 MG/ML
15 INJECTION, SOLUTION INTRAMUSCULAR; INTRAVENOUS EVERY 6 HOURS PRN
Status: DISCONTINUED | OUTPATIENT
Start: 2022-11-13 | End: 2022-11-13

## 2022-11-13 RX ORDER — KETOROLAC TROMETHAMINE 15 MG/ML
15 INJECTION, SOLUTION INTRAMUSCULAR; INTRAVENOUS EVERY 6 HOURS
Status: DISCONTINUED | OUTPATIENT
Start: 2022-11-13 | End: 2022-11-15

## 2022-11-13 RX ADMIN — TAZOBACTAM SODIUM AND PIPERACILLIN SODIUM 4.5 G: 500; 4 INJECTION, SOLUTION INTRAVENOUS at 10:56

## 2022-11-13 RX ADMIN — HEPARIN SODIUM 5000 UNITS: 10000 INJECTION, SOLUTION INTRAVENOUS; SUBCUTANEOUS at 21:54

## 2022-11-13 RX ADMIN — HYDROMORPHONE HYDROCHLORIDE 0.4 MG: 0.2 INJECTION, SOLUTION INTRAMUSCULAR; INTRAVENOUS; SUBCUTANEOUS at 21:47

## 2022-11-13 RX ADMIN — TAZOBACTAM SODIUM AND PIPERACILLIN SODIUM 4.5 G: 500; 4 INJECTION, SOLUTION INTRAVENOUS at 22:41

## 2022-11-13 RX ADMIN — SODIUM CHLORIDE: 9 INJECTION, SOLUTION INTRAVENOUS at 10:21

## 2022-11-13 RX ADMIN — HYDROMORPHONE HYDROCHLORIDE 0.4 MG: 0.2 INJECTION, SOLUTION INTRAMUSCULAR; INTRAVENOUS; SUBCUTANEOUS at 01:41

## 2022-11-13 RX ADMIN — ACETAMINOPHEN 1000 MG: 10 INJECTION, SOLUTION INTRAVENOUS at 21:54

## 2022-11-13 RX ADMIN — HYDROMORPHONE HYDROCHLORIDE 0.4 MG: 0.2 INJECTION, SOLUTION INTRAMUSCULAR; INTRAVENOUS; SUBCUTANEOUS at 06:36

## 2022-11-13 RX ADMIN — ACETAMINOPHEN 1000 MG: 10 INJECTION, SOLUTION INTRAVENOUS at 10:16

## 2022-11-13 RX ADMIN — HYDROMORPHONE HYDROCHLORIDE 0.4 MG: 0.2 INJECTION, SOLUTION INTRAMUSCULAR; INTRAVENOUS; SUBCUTANEOUS at 08:48

## 2022-11-13 RX ADMIN — ACETAMINOPHEN 1000 MG: 10 INJECTION, SOLUTION INTRAVENOUS at 15:44

## 2022-11-13 RX ADMIN — HYDROMORPHONE HYDROCHLORIDE 0.4 MG: 0.2 INJECTION, SOLUTION INTRAMUSCULAR; INTRAVENOUS; SUBCUTANEOUS at 15:55

## 2022-11-13 RX ADMIN — ACETAMINOPHEN 1000 MG: 10 INJECTION, SOLUTION INTRAVENOUS at 04:27

## 2022-11-13 RX ADMIN — SODIUM CHLORIDE: 9 INJECTION, SOLUTION INTRAVENOUS at 01:49

## 2022-11-13 RX ADMIN — SODIUM CHLORIDE: 9 INJECTION, SOLUTION INTRAVENOUS at 19:33

## 2022-11-13 RX ADMIN — HYDROMORPHONE HYDROCHLORIDE 0.4 MG: 0.2 INJECTION, SOLUTION INTRAMUSCULAR; INTRAVENOUS; SUBCUTANEOUS at 12:13

## 2022-11-13 RX ADMIN — KETOROLAC TROMETHAMINE 15 MG: 15 INJECTION, SOLUTION INTRAMUSCULAR; INTRAVENOUS at 15:44

## 2022-11-13 RX ADMIN — TAZOBACTAM SODIUM AND PIPERACILLIN SODIUM 4.5 G: 500; 4 INJECTION, SOLUTION INTRAVENOUS at 16:14

## 2022-11-13 RX ADMIN — HEPARIN SODIUM 5000 UNITS: 10000 INJECTION, SOLUTION INTRAVENOUS; SUBCUTANEOUS at 10:56

## 2022-11-13 RX ADMIN — PANTOPRAZOLE SODIUM 40 MG: 40 INJECTION, POWDER, FOR SOLUTION INTRAVENOUS at 07:22

## 2022-11-13 RX ADMIN — KETOROLAC TROMETHAMINE 15 MG: 15 INJECTION, SOLUTION INTRAMUSCULAR; INTRAVENOUS at 10:56

## 2022-11-13 RX ADMIN — KETOROLAC TROMETHAMINE 15 MG: 15 INJECTION, SOLUTION INTRAMUSCULAR; INTRAVENOUS at 21:51

## 2022-11-13 ASSESSMENT — ACTIVITIES OF DAILY LIVING (ADL)
ADLS_ACUITY_SCORE: 25

## 2022-11-13 NOTE — PLAN OF CARE
Patient is alert and oriented. C/o abdominal pain rating 9/10. PRN dilaudid administered x3. Midline incision is intact without drainage; steri-strips in place. Bowel sounds are audible. NG in place; continuous low suction. [place] is unchanged. 225 ml of brownish-clear output. VSS.

## 2022-11-13 NOTE — PLAN OF CARE
Patient is alert and oriented to self and place. He is pleasant and uses call light appropriately. VSS. Denied nausea and SOB. C/O constant pain to abdomen but believes it is being better managed than before. NG tube in place. [place] unchanged. Patient remains NPO. Will Continue to monitor and update MD as appropriate.     Goal Outcome Evaluation:      Plan of Care Reviewed With: patient    Overall Patient Progress: improvingOverall Patient Progress: improving

## 2022-11-13 NOTE — PROGRESS NOTES
GENERAL SURGERY PROGRESS NOTE  11/13/2022      Interval history:   No acute events overnight.  Patient states pain comes and goes in waves, get to 8-9/10 at times.  Denies nausea or vomiting.  Denies flatus or BM.  Denies fevers or chills.  Patient is ambulating in the hallway with assistance    Physical Exam:   Tmax 100.3F, BP & HR normal    UOP over past 24 hours is 2050 mL  NGT 225mL    General: Laying in bed, appears uncomfortable  HEENT:  nasogastric tube pulling dark bilious stomach fluid  Abdomen: Abdomen is soft, appropriate tenderness about the incision, worse in RUQ.  Mildly distended.  Incision is clean and intact with Steri-Strips, no surrounding erythema or induration.  No active drainage or bleeding  MSK: Normal bulk and tone, no lower extremity edema  Neuro: Alert and oriented, normal speech and mentation    Labs are reviewed and are significant for sodium 135, potassium 3.9, creatinine 1.6, phosphorus 2.2, WBC 11.5, hemoglobin 10.4, platelet 248    CXR pending       Assessment / Plan:   Julius Hansen is a 62 year old male who is postop day 3 from right hemicolectomy.      Neuro/pain control:  add scheduled toradol and kidney function has normalized, IV Tylenol and as needed Dilaudid for breakthrough pain  Pulmonary: Continue aggressive pulmonary toilet  Cardiac/hemodynamics: Hemodynamically stable, no concerns  Fluids, electrolytes, renal: Continue IV fluids, creatinine is trending down, urine output is normal, replace phosphorus  Gastrointestinal: Status post right hemicolectomy, awaiting return of bowel function, keep NG tube until signs of bowel function  Endocrine: No concerns  Infectious disease:  Will start Zosyn for leukocytosis and fevers, follow labs, follow-up CXR and UA  Hematologic:  Continue Lovenox and SCDs for DVT prophylaxis        CHAITANYA Tran MD   11/13/2022

## 2022-11-14 LAB
ANION GAP SERPL CALCULATED.3IONS-SCNC: 13 MMOL/L (ref 7–15)
BUN SERPL-MCNC: 11.2 MG/DL (ref 8–23)
CALCIUM SERPL-MCNC: 8 MG/DL (ref 8.8–10.2)
CHLORIDE SERPL-SCNC: 101 MMOL/L (ref 98–107)
CREAT SERPL-MCNC: 1.16 MG/DL (ref 0.67–1.17)
DEPRECATED HCO3 PLAS-SCNC: 19 MMOL/L (ref 22–29)
ERYTHROCYTE [DISTWIDTH] IN BLOOD BY AUTOMATED COUNT: 12.9 % (ref 10–15)
FLUAV RNA SPEC QL NAA+PROBE: NEGATIVE
FLUBV RNA RESP QL NAA+PROBE: NEGATIVE
GFR SERPL CREATININE-BSD FRML MDRD: 71 ML/MIN/1.73M2
GLUCOSE SERPL-MCNC: 84 MG/DL (ref 70–99)
HCT VFR BLD AUTO: 30 % (ref 40–53)
HGB BLD-MCNC: 10 G/DL (ref 13.3–17.7)
HOLD SPECIMEN: NORMAL
HOLD SPECIMEN: NORMAL
MAGNESIUM SERPL-MCNC: 1.9 MG/DL (ref 1.7–2.3)
MCH RBC QN AUTO: 29.7 PG (ref 26.5–33)
MCHC RBC AUTO-ENTMCNC: 33.3 G/DL (ref 31.5–36.5)
MCV RBC AUTO: 89 FL (ref 78–100)
PHOSPHATE SERPL-MCNC: 2.4 MG/DL (ref 2.5–4.5)
PLATELET # BLD AUTO: 245 10E3/UL (ref 150–450)
POTASSIUM SERPL-SCNC: 3.9 MMOL/L (ref 3.4–5.3)
RBC # BLD AUTO: 3.37 10E6/UL (ref 4.4–5.9)
RSV RNA SPEC NAA+PROBE: NEGATIVE
SARS-COV-2 RNA RESP QL NAA+PROBE: NEGATIVE
SODIUM SERPL-SCNC: 133 MMOL/L (ref 136–145)
WBC # BLD AUTO: 9.9 10E3/UL (ref 4–11)

## 2022-11-14 PROCEDURE — 87637 SARSCOV2&INF A&B&RSV AMP PRB: CPT | Performed by: SURGERY

## 2022-11-14 PROCEDURE — 99024 POSTOP FOLLOW-UP VISIT: CPT | Performed by: SURGERY

## 2022-11-14 PROCEDURE — 120N000001 HC R&B MED SURG/OB

## 2022-11-14 PROCEDURE — C9113 INJ PANTOPRAZOLE SODIUM, VIA: HCPCS | Performed by: SURGERY

## 2022-11-14 PROCEDURE — 36415 COLL VENOUS BLD VENIPUNCTURE: CPT | Performed by: SURGERY

## 2022-11-14 PROCEDURE — 80048 BASIC METABOLIC PNL TOTAL CA: CPT | Performed by: SURGERY

## 2022-11-14 PROCEDURE — 250N000011 HC RX IP 250 OP 636: Performed by: SURGERY

## 2022-11-14 PROCEDURE — 84100 ASSAY OF PHOSPHORUS: CPT | Performed by: SURGERY

## 2022-11-14 PROCEDURE — 85027 COMPLETE CBC AUTOMATED: CPT | Performed by: SURGERY

## 2022-11-14 PROCEDURE — 258N000003 HC RX IP 258 OP 636: Performed by: SURGERY

## 2022-11-14 PROCEDURE — 83735 ASSAY OF MAGNESIUM: CPT | Performed by: SURGERY

## 2022-11-14 RX ORDER — ENOXAPARIN SODIUM 100 MG/ML
30 INJECTION SUBCUTANEOUS EVERY 24 HOURS
Status: DISCONTINUED | OUTPATIENT
Start: 2022-11-14 | End: 2022-11-14 | Stop reason: DRUGHIGH

## 2022-11-14 RX ORDER — ENOXAPARIN SODIUM 100 MG/ML
40 INJECTION SUBCUTANEOUS EVERY 24 HOURS
Status: DISCONTINUED | OUTPATIENT
Start: 2022-11-14 | End: 2022-01-01 | Stop reason: HOSPADM

## 2022-11-14 RX ADMIN — ACETAMINOPHEN 1000 MG: 10 INJECTION, SOLUTION INTRAVENOUS at 04:27

## 2022-11-14 RX ADMIN — ENOXAPARIN SODIUM 40 MG: 40 INJECTION SUBCUTANEOUS at 09:40

## 2022-11-14 RX ADMIN — HYDROMORPHONE HYDROCHLORIDE 0.4 MG: 0.2 INJECTION, SOLUTION INTRAMUSCULAR; INTRAVENOUS; SUBCUTANEOUS at 09:16

## 2022-11-14 RX ADMIN — SODIUM CHLORIDE: 9 INJECTION, SOLUTION INTRAVENOUS at 22:40

## 2022-11-14 RX ADMIN — TAZOBACTAM SODIUM AND PIPERACILLIN SODIUM 4.5 G: 500; 4 INJECTION, SOLUTION INTRAVENOUS at 04:27

## 2022-11-14 RX ADMIN — KETOROLAC TROMETHAMINE 15 MG: 15 INJECTION, SOLUTION INTRAMUSCULAR; INTRAVENOUS at 21:30

## 2022-11-14 RX ADMIN — HYDROMORPHONE HYDROCHLORIDE 0.4 MG: 0.2 INJECTION, SOLUTION INTRAMUSCULAR; INTRAVENOUS; SUBCUTANEOUS at 01:50

## 2022-11-14 RX ADMIN — SODIUM CHLORIDE: 9 INJECTION, SOLUTION INTRAVENOUS at 14:10

## 2022-11-14 RX ADMIN — KETOROLAC TROMETHAMINE 15 MG: 15 INJECTION, SOLUTION INTRAMUSCULAR; INTRAVENOUS at 10:51

## 2022-11-14 RX ADMIN — ACETAMINOPHEN 1000 MG: 10 INJECTION, SOLUTION INTRAVENOUS at 16:09

## 2022-11-14 RX ADMIN — ACETAMINOPHEN 1000 MG: 10 INJECTION, SOLUTION INTRAVENOUS at 10:51

## 2022-11-14 RX ADMIN — KETOROLAC TROMETHAMINE 15 MG: 15 INJECTION, SOLUTION INTRAMUSCULAR; INTRAVENOUS at 16:07

## 2022-11-14 RX ADMIN — KETOROLAC TROMETHAMINE 15 MG: 15 INJECTION, SOLUTION INTRAMUSCULAR; INTRAVENOUS at 04:27

## 2022-11-14 RX ADMIN — ACETAMINOPHEN 1000 MG: 10 INJECTION, SOLUTION INTRAVENOUS at 21:30

## 2022-11-14 RX ADMIN — HYDROMORPHONE HYDROCHLORIDE 0.4 MG: 0.2 INJECTION, SOLUTION INTRAMUSCULAR; INTRAVENOUS; SUBCUTANEOUS at 06:15

## 2022-11-14 RX ADMIN — PANTOPRAZOLE SODIUM 40 MG: 40 INJECTION, POWDER, FOR SOLUTION INTRAVENOUS at 09:41

## 2022-11-14 ASSESSMENT — ACTIVITIES OF DAILY LIVING (ADL)
ADLS_ACUITY_SCORE: 25
ADLS_ACUITY_SCORE: 32
ADLS_ACUITY_SCORE: 25

## 2022-11-14 NOTE — PROGRESS NOTES
"Surgical Progress Note     POD# 4 s/p Right hemicolectomy    Subjective: No flatus, but feels close. Better pain relief.     Objective:  /69   Pulse 92   Temp 98.1  F (36.7  C) (Tympanic)   Resp 18   Ht 1.778 m (5' 10\")   Wt 90.1 kg (198 lb 11.2 oz)   SpO2 94%   BMI 28.51 kg/m      U/O: 250 mL last shift    N mL last shift       ABDOMEN: Flat soft and non-tender. Mild erythema to right of lower third of incision. No drainage.    LABS  Recent Labs   Lab Test 22  0621 22  0700 21  0505 17  1500 17  0759   WBC 9.9 11.5*   < >  --   --    RBC 3.37* 3.44*   < >  --   --    HGB 10.0* 10.4*   < > 14.0 14.6   HCT 30.0* 31.2*   < > 41.1 42.8   MCV 89 91   < > 85 85   MCH 29.7 30.2   < > 28.8 29.0   MCHC 33.3 33.3   < > 34.1 34.1    248   < > 226 247   MPV  --   --   --  8.2 8.4    < > = values in this interval not displayed.       Recent Labs   Lab Test 22  0621 22  0700   POTASSIUM 3.9 3.9   CHLORIDE 101 101   BUN 11.2 12.2       Recent Labs   Lab Test 22  1837 11/10/22  0618 22  0530 21  1635 21  1622   PROTEIN 30*  --   --   --  30*   BILITOTAL  --  0.7 0.9   < >  --    AST  --  18 19   < >  --    ALT  --  20 25   < >  --     < > = values in this interval not displayed.           ASSESSMENT  Stable after right hemicolectomy for SBO due to metastatic disease. Bowel function has not yet returned. Will watch wound for infection.    PLAN  Continue NGT   Pain: Ofirmev, Toradol, Dilaudid  Nutrition: NPO  Ulcer prophylaxis: Protonix  DVT prophylaxis : Lovenox and SCD's      Mirza Dumont MD      "

## 2022-11-14 NOTE — PROGRESS NOTES
Clinical Nutrition / Initial Assessment     Reason for Assessment:  NPO or clear liquid diet more than 5 days    Assessment:   Client History:  Pt post op day 4 following right hemicolectomy. Awaiting return of bowel function, remains NPO with NG tube in place.   Diet Order:  NPO  Weight:   Wt Readings from Last 10 Encounters:   11/14/22 90.1 kg (198 lb 11.2 oz)   11/02/21 90.7 kg (200 lb)   09/29/21 91 kg (200 lb 9.6 oz)   09/28/21 92.3 kg (203 lb 6.4 oz)   09/27/21 91.8 kg (202 lb 6.4 oz)   09/14/21 91.3 kg (201 lb 3.2 oz)   04/06/21 91.6 kg (202 lb)   03/29/21 91.8 kg (202 lb 6.4 oz)   03/24/21 92.7 kg (204 lb 6.4 oz)   03/18/21 95.3 kg (210 lb)     Malnutrition Criteria:  (Need to have 2 indicators to qualify recommendation)  Energy Intake:  Acute Severe: </= 50% of estimated energy requirement for >/= 5 days  Interpretation of Weight Loss:  No significant weight loss  Physical Findings:  No significant findings  Reduced  Strength:  Not Measured    Recommended Nutrition Diagnosis:   Not enough Malnutrition indicators found for medical diagnosis      Nutrition Education: Nutrition education will be provided as appropriate.    Nutrition Diagnosis: Oral or Nutrition Support Intake:  inadequate energy intakes related to recent surgery and NPO status as evidenced by NPO for past ~6 days related to bowel rest for SBO and surgery.    Intervention:  Nutrition Prescription:     Nutrition Intervention(s):Recommended general, healthful diet advanced per MD orders  1. Meals and Snacks: small/frequent meals/snacks when diet advances per MD orders  2. Medical Food Supplement: recommend supplements once able to tolerate PO until appetite returns     Nutrition Goal(s):  1. Pt will tolerate diet once advanced per MD orders  2. Pt will not have unplanned wt loss during hospitalization   3. Pt will consume 50% or more of meals and supplements once able to tolerate     Monitoring and Evaluation:   Food Intake, diet advancement  and tolerance, weights     Discharge Recommendation:   Nutrition Discharge Planning  recommend supplements if intakes are less than 50% at meals on discharge    RD will reassess in within 1-5 days or sooner.  Miri Benitez RD on 11/14/2022 at 10:19 AM

## 2022-11-14 NOTE — PHARMACY
12/13/20 0020   Oxygen Therapy/Pulse Ox   O2 Device Nasal cannula   O2 Flow Rate (L/min) 2 L/min   O2 Titration attempted (Day and Evening Shift) Yes   O2 Therapy Oxygen   $ Oxygen Charge / Day Yes   SpO2 96 %   SpO2 Activity  O2 at rest   $ Monitor Pulse Ox Continuous / Overnight Monitor     Pt on 2lit NC with no respiratory distress through the night. Pt has a right side chest tube. 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)     Diverticulosis of large intestine     H/O adenomatous polyp of colon     S/p nephrectomy right     Postoperative state     Ureteral sludge     Non-functioning kidney     Chronic kidney disease (CKD) stage G3a/A1, moderately decreased glomerular filtration rate (GFR) between 45-59 mL/min/1.73 square meter and albuminuria creatinine ratio less than 30 mg/g (H)     Encounter for screening laboratory testing for COVID-19 virus        Relevant Labs:  Recent Labs   Lab Test 11/14/22  0621 11/13/22  0700   WBC 9.9 11.5*   HGB 10.0* 10.4*    248        CrCl: 74.5 mL/min      Intake/Output Summary (Last 24 hours) at 11/14/2022 0747  Last data filed at 11/14/2022 0316  Gross per 24 hour   Intake 2457 ml   Output 1300 ml   Net 1157 ml          Per Renal Dose Adjustment Protocol, will adjust:  -Enoxaparin to 40 mg Q24H (indication of VTE ppx, CrCl > 30 mL/min, BMI 19-40)      Will continue to follow and make adjustments accordingly. Thank You.    Levi Bartlett [facility] ....................  11/14/2022   7:47 AM

## 2022-11-14 NOTE — PLAN OF CARE
"Patient refused to get out of bed. Pain rating 9/10-10. Dilaudid given x2; declined non-pharmaceutical interventions. Erythema around midline incision on abdomen. NG patent with brown output. Patient accidentally pulled the NG out \"a little\" and pushed it back in independently. Writer verified placement by aspirating gastric contents. VSS.       "

## 2022-11-14 NOTE — PROGRESS NOTES
SAFETY CHECKLIST  ID Bands and Risk clasps correct and in place (DNR, Fall risk, Allergy, Latex, Limb):  Yes  All Lines Reconciled and labeled correctly: Yes  Whiteboard updated:Yes  Environmental interventions: Yes - call light, items within reach, bed low, bed alarm, bed rails x 2  Verify Tele #:

## 2022-11-15 ENCOUNTER — APPOINTMENT (OUTPATIENT)
Dept: PHYSICAL THERAPY | Facility: OTHER | Age: 62
End: 2022-11-15
Attending: SURGERY
Payer: COMMERCIAL

## 2022-11-15 PROBLEM — N28.9 NON-FUNCTIONING KIDNEY: Status: RESOLVED | Noted: 2022-11-10 | Resolved: 2022-11-15

## 2022-11-15 PROBLEM — T81.49XA SUPERFICIAL POSTOPERATIVE WOUND INFECTION: Status: ACTIVE | Noted: 2022-11-15

## 2022-11-15 PROBLEM — N28.9: Status: RESOLVED | Noted: 2022-11-10 | Resolved: 2022-11-15

## 2022-11-15 PROCEDURE — 97530 THERAPEUTIC ACTIVITIES: CPT | Mod: GP

## 2022-11-15 PROCEDURE — 97161 PT EVAL LOW COMPLEX 20 MIN: CPT | Mod: GP

## 2022-11-15 PROCEDURE — 87205 SMEAR GRAM STAIN: CPT | Performed by: SURGERY

## 2022-11-15 PROCEDURE — 250N000011 HC RX IP 250 OP 636: Performed by: SURGERY

## 2022-11-15 PROCEDURE — 120N000001 HC R&B MED SURG/OB

## 2022-11-15 PROCEDURE — 258N000003 HC RX IP 258 OP 636: Performed by: SURGERY

## 2022-11-15 PROCEDURE — 99024 POSTOP FOLLOW-UP VISIT: CPT | Performed by: SURGERY

## 2022-11-15 PROCEDURE — 250N000013 HC RX MED GY IP 250 OP 250 PS 637: Performed by: SURGERY

## 2022-11-15 PROCEDURE — 999N000104 HC STATISTIC NO CHARGE

## 2022-11-15 PROCEDURE — 97116 GAIT TRAINING THERAPY: CPT | Mod: GP

## 2022-11-15 RX ORDER — IBUPROFEN 600 MG/1
600 TABLET, FILM COATED ORAL 4 TIMES DAILY
Status: DISCONTINUED | OUTPATIENT
Start: 2022-11-15 | End: 2022-01-01 | Stop reason: HOSPADM

## 2022-11-15 RX ORDER — OXYCODONE HYDROCHLORIDE 5 MG/1
5 TABLET ORAL EVERY 4 HOURS PRN
Status: DISCONTINUED | OUTPATIENT
Start: 2022-11-15 | End: 2022-01-01 | Stop reason: HOSPADM

## 2022-11-15 RX ORDER — ACETAMINOPHEN 325 MG/1
975 TABLET ORAL 4 TIMES DAILY
Status: DISCONTINUED | OUTPATIENT
Start: 2022-11-15 | End: 2022-01-01 | Stop reason: HOSPADM

## 2022-11-15 RX ORDER — PANTOPRAZOLE SODIUM 40 MG/1
40 TABLET, DELAYED RELEASE ORAL
Status: DISCONTINUED | OUTPATIENT
Start: 2022-11-15 | End: 2022-01-01 | Stop reason: HOSPADM

## 2022-11-15 RX ADMIN — TAZOBACTAM SODIUM AND PIPERACILLIN SODIUM 4.5 G: 500; 4 INJECTION, SOLUTION INTRAVENOUS at 15:35

## 2022-11-15 RX ADMIN — ENOXAPARIN SODIUM 40 MG: 40 INJECTION SUBCUTANEOUS at 08:28

## 2022-11-15 RX ADMIN — IBUPROFEN 600 MG: 600 TABLET ORAL at 10:42

## 2022-11-15 RX ADMIN — PANTOPRAZOLE SODIUM 40 MG: 40 TABLET, DELAYED RELEASE ORAL at 08:29

## 2022-11-15 RX ADMIN — ACETAMINOPHEN 975 MG: 325 TABLET ORAL at 13:34

## 2022-11-15 RX ADMIN — TAZOBACTAM SODIUM AND PIPERACILLIN SODIUM 4.5 G: 500; 4 INJECTION, SOLUTION INTRAVENOUS at 20:17

## 2022-11-15 RX ADMIN — TAZOBACTAM SODIUM AND PIPERACILLIN SODIUM 4.5 G: 500; 4 INJECTION, SOLUTION INTRAVENOUS at 08:28

## 2022-11-15 RX ADMIN — SODIUM CHLORIDE: 9 INJECTION, SOLUTION INTRAVENOUS at 06:59

## 2022-11-15 RX ADMIN — KETOROLAC TROMETHAMINE 15 MG: 15 INJECTION, SOLUTION INTRAMUSCULAR; INTRAVENOUS at 04:47

## 2022-11-15 RX ADMIN — IBUPROFEN 600 MG: 600 TABLET ORAL at 18:52

## 2022-11-15 RX ADMIN — OXYCODONE HYDROCHLORIDE 5 MG: 5 TABLET ORAL at 09:49

## 2022-11-15 RX ADMIN — ACETAMINOPHEN 975 MG: 325 TABLET ORAL at 20:17

## 2022-11-15 RX ADMIN — ACETAMINOPHEN 1000 MG: 10 INJECTION, SOLUTION INTRAVENOUS at 04:47

## 2022-11-15 ASSESSMENT — ACTIVITIES OF DAILY LIVING (ADL)
ADLS_ACUITY_SCORE: 32

## 2022-11-15 NOTE — PROGRESS NOTES
"Surgical Progress Note     POD# 5 s/p right hemicolectomy    Subjective: Passing flatus well. Hungry. Less pain.     Objective:  BP (!) 162/87 (BP Location: Right arm, Patient Position: Semi-Gtz's)   Pulse 91   Temp 100.2  F (37.9  C) (Tympanic)   Resp 18   Ht 1.778 m (5' 10\")   Wt 90.1 kg (198 lb 11.2 oz)   SpO2 97%   BMI 28.51 kg/m      U/O: 200 mL last shift    N mL last shift      ABDOMEN: Soft and non-tender. Small amount of erythema just below umbilical area. Small amount of wound opened and cultured.      LABS  Recent Labs   Lab Test 22  0621 22  0700 21  0505 17  1500 17  0759   WBC 9.9 11.5*   < >  --   --    RBC 3.37* 3.44*   < >  --   --    HGB 10.0* 10.4*   < > 14.0 14.6   HCT 30.0* 31.2*   < > 41.1 42.8   MCV 89 91   < > 85 85   MCH 29.7 30.2   < > 28.8 29.0   MCHC 33.3 33.3   < > 34.1 34.1    248   < > 226 247   MPV  --   --   --  8.2 8.4    < > = values in this interval not displayed.       Recent Labs   Lab Test 22  0621 22  0700   POTASSIUM 3.9 3.9   CHLORIDE 101 101   BUN 11.2 12.2       Recent Labs   Lab Test 22  1837 11/10/22  0618 22  0530 21  1635 21  1622   PROTEIN 30*  --   --   --  30*   BILITOTAL  --  0.7 0.9   < >  --    AST  --  18 19   < >  --    ALT  --  20 25   < >  --     < > = values in this interval not displayed.         ASSESSMENT  Bowel function returning. Superficial wound infection.     PLAN  Remove NGT  NS wet-to-dry dressing changes  Pain: Oral  Nutrition: Low residue  Ulcer prophylaxis: Protonix  DVT prophylaxis : Lovenox and SCD's  Zosyn for wound infection    Mirza Dumont MD      "

## 2022-11-15 NOTE — PLAN OF CARE
"Patient is alert and oriented x3. He is pleasant and uses call light appropriately. VSS. Denied SOB. C/O constant pain to abdomen but no longer wants to use the narcotic pain medication so he can \"go home with no issues\". NG tube in place. [place] unchanged. Patient remains NPO. He has been having a few ice chips. Intermittent nausea noted. Patient is passing flatus. Patient was up ambulating in hallway with no issues. Will Continue to monitor and update MD as appropriate.      Goal Outcome Evaluation:      Plan of Care Reviewed With: patient    Overall Patient Progress: improvingOverall Patient Progress: improving           "

## 2022-11-15 NOTE — PROGRESS NOTES
SAFETY CHECKLIST  ID Bands and Risk clasps correct and in place (DNR, Fall risk, Allergy, Latex, Limb):  Yes  All Lines Reconciled and labeled correctly: Yes  Whiteboard updated:Yes  Environmental interventions: Yes  Verify Tele #: n/a

## 2022-11-15 NOTE — PLAN OF CARE
"Patient is POD# 4 s/p Right hemicolectomy. VSS. Room Air. 99.7 temp. Pain still remains to lower abd. Declines ice. PRN dilaudid x 1. Scheduled IV APAP and toradol given. Patient is trying hard to not use narcotic medications. NG in place 65-64 landmark. Green drainage. 450 out. Denies nausea. BS hypoactive. Reports he has not passed gas yet but \"its close.\" Sat at bedside x 2, stood at bedside x 1, ambulated to bathroom and showered. Still did not want to walk, even in his room. Educated that he in order to progress towards discharge he needed to get moving and ambulate and he still did not want to. Therapy order obtained.       Problem: Plan of Care - These are the overarching goals to be used throughout the patient stay.    Goal: Plan of Care Review  Description: The Plan of Care Review/Shift note should be completed every shift.  The Outcome Evaluation is a brief statement about your assessment that the patient is improving, declining, or no change.  This information will be displayed automatically on your shift note.  Outcome: Progressing  Goal: Patient-Specific Goal (Individualized)  Description: You can add care plan individualizations to a care plan. Examples of Individualization might be:  \"Parent requests to be called daily at 9am for status\", \"I have a hard time hearing out of my right ear\", or \"Do not touch me to wake me up as it startles me\".  Outcome: Progressing  Goal: Absence of Hospital-Acquired Illness or Injury  Outcome: Progressing  Intervention: Identify and Manage Fall Risk  Recent Flowsheet Documentation  Taken 11/14/2022 1600 by Kathleen Hendricks, RN  Safety Promotion/Fall Prevention:    activity supervised    assistive device/personal items within reach    bed alarm on    clutter free environment maintained    fall prevention program maintained    nonskid shoes/slippers when out of bed    patient and family education    supervised activity    treat reversible contributory factors    treat " underlying cause  Taken 11/14/2022 0938 by Kathleen Hendricks RN  Safety Promotion/Fall Prevention:    activity supervised    assistive device/personal items within reach    bed alarm on    clutter free environment maintained    fall prevention program maintained    nonskid shoes/slippers when out of bed    patient and family education    supervised activity    treat reversible contributory factors    treat underlying cause  Intervention: Prevent Skin Injury  Recent Flowsheet Documentation  Taken 11/14/2022 1609 by Kathleen Hendricks RN  Body Position: position changed independently  Taken 11/14/2022 0938 by Kathleen Hendricks RN  Body Position: position changed independently  Intervention: Prevent and Manage VTE (Venous Thromboembolism) Risk  Recent Flowsheet Documentation  Taken 11/14/2022 1609 by Kathleen Hendricks RN  VTE Prevention/Management: (Lovenox)    SCDs (sequential compression devices) off    patient refused intervention  Taken 11/14/2022 0938 by Kathleen Hendricks RN  VTE Prevention/Management: (Lovenox)    SCDs (sequential compression devices) off    patient refused intervention  Goal: Optimal Comfort and Wellbeing  Outcome: Progressing  Intervention: Monitor Pain and Promote Comfort  Recent Flowsheet Documentation  Taken 11/14/2022 1609 by Kathleen Hendricks RN  Pain Management Interventions: medication (see MAR)  Taken 11/14/2022 0938 by Kathleen Hendricks RN  Pain Management Interventions: medication (see MAR)  Goal: Readiness for Transition of Care  Outcome: Progressing     Problem: Nausea and Vomiting  Goal: Nausea and Vomiting Relief  Outcome: Progressing  Intervention: Prevent and Manage Nausea and Vomiting  Recent Flowsheet Documentation  Taken 11/14/2022 1609 by Kathleen Hendricks RN  Nausea/Vomiting Interventions: nasogastric tube to suction  Taken 11/14/2022 0938 by Kathleen Hendricks RN  Nausea/Vomiting Interventions: nasogastric tube to suction     Problem: Pain  Acute  Goal: Optimal Pain Control and Function  Outcome: Progressing  Intervention: Develop Pain Management Plan  Recent Flowsheet Documentation  Taken 11/14/2022 1609 by Kathleen Hendricks RN  Pain Management Interventions: medication (see MAR)  Taken 11/14/2022 0938 by Kathleen Hendricks RN  Pain Management Interventions: medication (see MAR)  Intervention: Prevent or Manage Pain  Recent Flowsheet Documentation  Taken 11/14/2022 1609 by Kathleen Hendricks RN  Medication Review/Management: medications reviewed  Taken 11/14/2022 0938 by Kathleen Hendricks RN  Medication Review/Management: medications reviewed     Problem: Bowel Resection  Goal: Absence of Bleeding  Outcome: Progressing  Goal: Effective Bowel Motility and Elimination  Outcome: Progressing  Goal: Fluid and Electrolyte Balance  Outcome: Progressing  Goal: Absence of Infection Signs and Symptoms  Outcome: Progressing  Goal: Fluid, Electrolyte and Nutrition Balance  Outcome: Progressing  Goal: Anesthesia/Sedation Recovery  Intervention: Optimize Anesthesia Recovery  Recent Flowsheet Documentation  Taken 11/14/2022 1609 by Kathleen Hendricks RN  Safety Promotion/Fall Prevention:    activity supervised    assistive device/personal items within reach    bed alarm on    clutter free environment maintained    fall prevention program maintained    nonskid shoes/slippers when out of bed    patient and family education    supervised activity    treat reversible contributory factors    treat underlying cause  Administration (IS): self-administered  Taken 11/14/2022 0938 by Kathleen Hendricks RN  Safety Promotion/Fall Prevention:    activity supervised    assistive device/personal items within reach    bed alarm on    clutter free environment maintained    fall prevention program maintained    nonskid shoes/slippers when out of bed    patient and family education    supervised activity    treat reversible contributory factors    treat underlying  cause  Administration (IS): self-administered  Goal: Acceptable Pain Control  Outcome: Progressing  Intervention: Prevent or Manage Pain  Recent Flowsheet Documentation  Taken 11/14/2022 1609 by Kathleen Hendricks RN  Pain Management Interventions: medication (see MAR)  Taken 11/14/2022 0938 by Kathleen Hendricks RN  Pain Management Interventions: medication (see MAR)  Goal: Nausea and Vomiting Relief  Outcome: Progressing  Intervention: Prevent or Manage Nausea and Vomiting  Recent Flowsheet Documentation  Taken 11/14/2022 1609 by Kathleen Hendricks RN  Nausea/Vomiting Interventions: nasogastric tube to suction  Taken 11/14/2022 0938 by Kathleen Hendricks RN  Nausea/Vomiting Interventions: nasogastric tube to suction  Goal: Effective Urinary Elimination  Outcome: Progressing  Goal: Effective Oxygenation and Ventilation  Outcome: Progressing   Goal Outcome Evaluation:

## 2022-11-15 NOTE — PROGRESS NOTES
SAFETY CHECKLIST  ID Bands and Risk clasps correct and in place (DNR, Fall risk, Allergy, Latex, Limb):  Yes  All Lines Reconciled and labeled correctly: Yes  Whiteboard updated:Yes  Environmental interventions: Yes  Verify Tele #: DESHAWN Sky RN on 11/15/2022 at 7:26 AM

## 2022-11-15 NOTE — PROGRESS NOTES
SAFETY CHECKLIST  ID Bands and Risk clasps correct and in place (DNR, Fall risk, Allergy, Latex, Limb):  Yes  All Lines Reconciled and labeled correctly: Yes  Whiteboard updated:Yes  Environmental interventions: Yes  Verify Tele #: DESHAWN Sky RN on 11/15/2022 at 7:25 AM

## 2022-11-15 NOTE — PHARMACY
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[place] palsy     Marijuana use     Lyme disease     Tobacco dependence     Elevated serum creatinine     Elevated blood pressure reading without diagnosis of hypertension     Chronic kidney disease, stage 3     Small bowel obstruction (H)     Rosacea     Urothelial cancer (H)     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)     Diverticulosis of large intestine     H/O adenomatous polyp of colon     S/p nephrectomy right     Postoperative state     Chronic kidney disease (CKD) stage G3a/A1, moderately decreased glomerular filtration rate (GFR) between 45-59 mL/min/1.73 square meter and albuminuria creatinine ratio less than 30 mg/g (H)     Encounter for screening laboratory testing for COVID-19 virus     Superficial postoperative wound infection        Relevant Labs:  Recent Labs   Lab Test 11/14/22  0621 11/13/22  0700   WBC 9.9 11.5*   HGB 10.0* 10.4*    248        CrCl: 74.5 mL/min      Intake/Output Summary (Last 24 hours) at 11/15/2022 0800  Last data filed at 11/15/2022 0632  Gross per 24 hour   Intake 1906 ml   Output 1500 ml   Net 406 ml          Per Automatic Medication Dose Adjustment Protocol, will adjust:  -Pip/tazo to 4.5 g Q6H (increased from 3.375 g) due to CrCl > 40 mL/min and patient weight > 90 kg.      Will continue to follow and make adjustments accordingly. Thank You.    Levi Bartlett [facility] ....................  11/15/2022   8:00 AM

## 2022-11-16 ENCOUNTER — TELEPHONE (OUTPATIENT)
Dept: ONCOLOGY | Facility: OTHER | Age: 62
End: 2022-11-16

## 2022-11-16 ENCOUNTER — TELEPHONE (OUTPATIENT)
Dept: SURGERY | Facility: OTHER | Age: 62
End: 2022-11-16

## 2022-11-16 DIAGNOSIS — C68.9 UROTHELIAL CANCER (H): Primary | ICD-10-CM

## 2022-11-16 PROBLEM — N18.31 CHRONIC KIDNEY DISEASE (CKD) STAGE G3A/A1, MODERATELY DECREASED GLOMERULAR FILTRATION RATE (GFR) BETWEEN 45-59 ML/MIN/1.73 SQUARE METER AND ALBUMINURIA CREATININE RATIO LESS THAN 30 MG/G (H): Status: RESOLVED | Noted: 2022-11-10 | Resolved: 2022-11-16

## 2022-11-16 LAB
PATH REPORT.COMMENTS IMP SPEC: NORMAL
PATH REPORT.FINAL DX SPEC: NORMAL
PATH REPORT.RELEVANT HX SPEC: NORMAL
PHOTO IMAGE: NORMAL

## 2022-11-16 PROCEDURE — 99024 POSTOP FOLLOW-UP VISIT: CPT | Performed by: SURGERY

## 2022-11-16 PROCEDURE — 250N000011 HC RX IP 250 OP 636: Performed by: SURGERY

## 2022-11-16 PROCEDURE — 120N000001 HC R&B MED SURG/OB

## 2022-11-16 PROCEDURE — 250N000013 HC RX MED GY IP 250 OP 250 PS 637: Performed by: SURGERY

## 2022-11-16 RX ORDER — LORAZEPAM 1 MG/1
1 TABLET ORAL EVERY 6 HOURS PRN
Status: DISCONTINUED | OUTPATIENT
Start: 2022-11-16 | End: 2022-01-01 | Stop reason: HOSPADM

## 2022-11-16 RX ORDER — BISACODYL 10 MG
10 SUPPOSITORY, RECTAL RECTAL ONCE
Status: COMPLETED | OUTPATIENT
Start: 2022-11-16 | End: 2022-11-16

## 2022-11-16 RX ORDER — NICOTINE 21 MG/24HR
1 PATCH, TRANSDERMAL 24 HOURS TRANSDERMAL DAILY
Status: DISCONTINUED | OUTPATIENT
Start: 2022-11-16 | End: 2022-01-01 | Stop reason: HOSPADM

## 2022-11-16 RX ADMIN — IBUPROFEN 600 MG: 600 TABLET ORAL at 17:49

## 2022-11-16 RX ADMIN — NICOTINE 1 PATCH: 21 PATCH, EXTENDED RELEASE TRANSDERMAL at 08:25

## 2022-11-16 RX ADMIN — TAZOBACTAM SODIUM AND PIPERACILLIN SODIUM 4.5 G: 500; 4 INJECTION, SOLUTION INTRAVENOUS at 19:55

## 2022-11-16 RX ADMIN — BISACODYL 10 MG: 10 SUPPOSITORY RECTAL at 08:26

## 2022-11-16 RX ADMIN — TAZOBACTAM SODIUM AND PIPERACILLIN SODIUM 4.5 G: 500; 4 INJECTION, SOLUTION INTRAVENOUS at 07:58

## 2022-11-16 RX ADMIN — IBUPROFEN 600 MG: 600 TABLET ORAL at 00:45

## 2022-11-16 RX ADMIN — LORAZEPAM 1 MG: 1 TABLET ORAL at 12:25

## 2022-11-16 RX ADMIN — ACETAMINOPHEN 975 MG: 325 TABLET ORAL at 19:59

## 2022-11-16 RX ADMIN — IBUPROFEN 600 MG: 600 TABLET ORAL at 12:25

## 2022-11-16 RX ADMIN — PANTOPRAZOLE SODIUM 40 MG: 40 TABLET, DELAYED RELEASE ORAL at 07:58

## 2022-11-16 RX ADMIN — TAZOBACTAM SODIUM AND PIPERACILLIN SODIUM 4.5 G: 500; 4 INJECTION, SOLUTION INTRAVENOUS at 13:57

## 2022-11-16 RX ADMIN — IBUPROFEN 600 MG: 600 TABLET ORAL at 06:10

## 2022-11-16 RX ADMIN — TAZOBACTAM SODIUM AND PIPERACILLIN SODIUM 4.5 G: 500; 4 INJECTION, SOLUTION INTRAVENOUS at 01:59

## 2022-11-16 RX ADMIN — ACETAMINOPHEN 975 MG: 325 TABLET ORAL at 07:58

## 2022-11-16 RX ADMIN — ENOXAPARIN SODIUM 40 MG: 40 INJECTION SUBCUTANEOUS at 07:58

## 2022-11-16 ASSESSMENT — ACTIVITIES OF DAILY LIVING (ADL)
ADLS_ACUITY_SCORE: 32

## 2022-11-16 NOTE — PLAN OF CARE
Patient is alert and oriented x3. He is pleasant and uses call light appropriately. VSS. Denied SOB. C/O constant pain to abdomen, which he believes has greatly improved. He did refuse x1 dose of APAP.  Patient is tolerating food without issues. He had a BM today. PRN ativan ordered for anxiety, which was effective. Education provided to wife and patient regarding wound care to abdomen. Wife verbalized understanding and was confident she is able to complete wound care at home. Will Continue to monitor and update MD as appropriate.     Goal Outcome Evaluation:      Plan of Care Reviewed With: patient    Overall Patient Progress: improvingOverall Patient Progress: improving

## 2022-11-16 NOTE — TELEPHONE ENCOUNTER
Oncology/Hematology Care Coordination - Referral Review    Referred by:  Dr. Mirza Dumont     Diagnosis:  Urothelial  cancer    Imaging:  CT abdomen 11/7 [facility], CT abd [facility] 6/13/22, [facility]     Lab:      Surgery/Biopsy:  Most recent: Urethral resection 8/17/22    Pathology:  [facility]    Outside Records:  [facility]  Recently admitted to [facility]. He presented to the emergency department with at least 4 days of abdominal pain and 2 days without any bowel movement.  He was found to have high-grade small bowel obstruction near site of right nephrectomy earlier this year.    Marce Iqbal RN on 11/16/2022 at 1:25 PM

## 2022-11-16 NOTE — PROGRESS NOTES
"Surgical Progress Note     POD# 6 s/p right hemicolectomy    Subjective: Tolerating diet. Passing good flatus. Had BM this morning. Wants nicotine as has not smoked in two weeks . Not sleeping due to anxiety. Pathology reviewed with patient and spouse.    Objective:  /88 (BP Location: Right arm, Patient Position: Semi-Gtz's)   Pulse 77   Temp 99.2  F (37.3  C) (Tympanic)   Resp 18   Ht 1.778 m (5' 10\")   Wt 91.4 kg (201 lb 6.4 oz)   SpO2 98%   BMI 28.90 kg/m          ABDOMEN: wound with minimal remaining erythema. Soft and non-tender.    LABS  Recent Labs   Lab Test 11/14/22  0621 11/13/22  0700 03/18/21  0505 08/14/17  1500 08/13/17  0759   WBC 9.9 11.5*   < >  --   --    RBC 3.37* 3.44*   < >  --   --    HGB 10.0* 10.4*   < > 14.0 14.6   HCT 30.0* 31.2*   < > 41.1 42.8   MCV 89 91   < > 85 85   MCH 29.7 30.2   < > 28.8 29.0   MCHC 33.3 33.3   < > 34.1 34.1    248   < > 226 247   MPV  --   --   --  8.2 8.4    < > = values in this interval not displayed.       Recent Labs   Lab Test 11/14/22  0621 11/13/22  0700   POTASSIUM 3.9 3.9   CHLORIDE 101 101   BUN 11.2 12.2       Recent Labs   Lab Test 11/13/22  1837 11/10/22  0618 11/09/22  0530 09/28/21  1635 09/14/21  1622   PROTEIN 30*  --   --   --  30*   BILITOTAL  --  0.7 0.9   < >  --    AST  --  18 19   < >  --    ALT  --  20 25   < >  --     < > = values in this interval not displayed.         ASSESSMENT  Stable after right hemicolectomy. Bowel function returning. Superficial wound infection improving, will heal by secondary intention.    PLAN  Teach patient/spouse NS wet-to-dry dressing changes BID  Pain: Oral  Nutrition: Regular  Ulcer prophylaxis: Protonix  DVT prophylaxis : Lovenox and SCD's  ID: Antibiotics Zosyn  day 2  Nicotine patch  [facility]    Mirza Dumont MD      "

## 2022-11-16 NOTE — PLAN OF CARE
"Patient POD 6, vitally stable, afebrile.  Wet to Dry dressing change to be done BID, patient tolerated well.  Walked in hallway with SBA only. Appears steady on feet.  NS running at 10 ml/hr, dressing to IV site changed. Denies nausea, intaking fluids.  Declined tylenol at 0200 as he was sure it was keeping him from sleeping well.  Using urinal.    /88 (BP Location: Right arm, Patient Position: Semi-Gtz's)   Pulse 77   Temp 99.2  F (37.3  C) (Tympanic)   Resp 18   Ht 1.778 m (5' 10\")   Wt 91.4 kg (201 lb 6.4 oz)   SpO2 98%   BMI 28.90 kg/m        Goal Outcome Evaluation:      Plan of Care Reviewed With: patient    Overall Patient Progress: improvingOverall Patient Progress: improving           "

## 2022-11-16 NOTE — TELEPHONE ENCOUNTER
Spoke with Dr. Dumont about patient. Will see him in clinic and Dr. Dumont will put in referral for ONC. Marce SERVIN updated on referral. Iqra Mora RN  ....................  11/16/2022   12:15 PM

## 2022-11-16 NOTE — PROGRESS NOTES
Nicotine patch was removed due to patient starting to feel sick, dose to strong. Myra Mccabe RN 11/16/2022 9:29 AM

## 2022-11-16 NOTE — PROGRESS NOTES
SAFETY CHECKLIST  ID Bands and Risk clasps correct and in place (DNR, Fall risk, Allergy, Latex, Limb):  Yes  All Lines Reconciled and labeled correctly: Yes  Whiteboard updated:Yes  Environmental interventions: Yes - call light within reach, bed alarm  Verify Tele #:

## 2022-11-16 NOTE — PROGRESS NOTES
Wet to dry dressing change completed.  Patient tolerated well.  Removing tape appeared to be the most painful.

## 2022-11-17 NOTE — PHARMACY - DISCHARGE MEDICATION RECONCILIATION AND EDUCATION
Pharmacy:  Discharge Counseling and Medication Reconciliation    Julius Hansen  [address] [place] [id number]  [phone] (home)   62 year old male  PCP: Dorota Posada    Allergies: Patient has no known allergies.    Discharge Counseling:    Pharmacist met with patient today to review the medication portion of the After Visit Summary (with an emphasis on NEW medications) and to address patient's questions/concerns.    Summary of Education: Counseled patient on new medications of Oxycodone, Ibuprofen, and Acetaminophen, including indication, administration, and possible common side effects.    Counseled patient that Oxycodone is an oral narcotic pain medication that can cause drowsiness- patient should not drive after taking, should not mix with alcohol, or take with other prescription narcotic pain medications.      Materials Provided:  MedCounselor sheets printed from Clinical Pharmacology on: Oxycodone, Ibuprofen, and Acetaminophen    Discharge Medication Reconciliation:  Zosyn was originally started while here, but no ABX were prescribed at discharge. Spoke with Dr. Dumont- surgical site does not appear infectious at this time, no ABX needed at discharge.    It has been determined that the patient has an adequate supply of medications available or which can be obtained from the patient's preferred pharmacy, which he has confirmed as: [facility].    Thank you for the consult.    Levi Bartlett [facility]........November 17, 2022 9:19 AM

## 2022-11-17 NOTE — PROGRESS NOTES
NS wet to dry dressing change done per order.  Patient had refused dressing change previously, but was compliant at this time.  Tolerated well.

## 2022-11-17 NOTE — PROGRESS NOTES
"Surgical Progress Note     POD# 7 s/p right hemicolectomy    Subjective: Slept better. Wants to go home    Objective:  BP (!) 152/85 (BP Location: Right arm, Patient Position: Supine)   Pulse 85   Temp 99.9  F (37.7  C) (Tympanic)   Resp 16   Ht 1.778 m (5' 10\")   Wt 91.4 kg (201 lb 6.4 oz)   SpO2 96%   BMI 28.90 kg/m        ABDOMEN: Wound without erythema. Starting to granulate. Soft and non-tender.    LABS  Recent Labs   Lab Test 11/14/22  0621 11/13/22  0700 03/18/21  0505 08/14/17  1500 08/13/17  0759   WBC 9.9 11.5*   < >  --   --    RBC 3.37* 3.44*   < >  --   --    HGB 10.0* 10.4*   < > 14.0 14.6   HCT 30.0* 31.2*   < > 41.1 42.8   MCV 89 91   < > 85 85   MCH 29.7 30.2   < > 28.8 29.0   MCHC 33.3 33.3   < > 34.1 34.1    248   < > 226 247   MPV  --   --   --  8.2 8.4    < > = values in this interval not displayed.       Recent Labs   Lab Test 11/14/22  0621 11/13/22  0700   POTASSIUM 3.9 3.9   CHLORIDE 101 101   BUN 11.2 12.2       Recent Labs   Lab Test 11/13/22  1837 11/10/22  0618 11/09/22  0530 09/28/21  1635 09/14/21  1622   PROTEIN 30*  --   --   --  30*   BILITOTAL  --  0.7 0.9   < >  --    AST  --  18 19   < >  --    ALT  --  20 25   < >  --     < > = values in this interval not displayed.           ASSESSMENT  Doing well after right hemicolectomy    PLAN  Discharge home      Mirza Dumont MD      "

## 2022-11-17 NOTE — DISCHARGE SUMMARY
[facility] Discharge Summary    Julius Hansen MRN# [id number]   Age: 62 year old YOB: 1960     Date of Admission:  11/7/2022  Date of Discharge::  11/17/2022  Admitting Physician:  Mirza Dumont MD  Discharge Physician:  Mirza Dumont MD     Home clinic: [facility]          Admission Diagnoses:   Non-functioning kidney [N28.9]  Small bowel obstruction (H) [K56.609]  Stage 3a chronic kidney disease (H) [N18.31]  Encounter for screening laboratory testing for COVID-19 virus [Z20.822]          Discharge Diagnosis:   Patient Active Problem List   Diagnosis     Facial paralysis/[place] palsy     Marijuana use     Lyme disease     Tobacco dependence     Elevated serum creatinine     Elevated blood pressure reading without diagnosis of hypertension     Chronic kidney disease, stage 3     Small bowel obstruction (H)     Rosacea     Urothelial cancer (H)     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)     Diverticulosis of large intestine     H/O adenomatous polyp of colon     S/p nephrectomy right     Postoperative state     Encounter for screening laboratory testing for COVID-19 virus     Superficial postoperative wound infection   Metastatic urothelial carcinoma to terminal ileum, appendix, right colon mesentary and serosa with 2/5 lymph nodes positive            Procedures:   Procedure(s): Right hemicolectomy                Medications Prior to Admission:     Medications Prior to Admission   Medication Sig Dispense Refill Last Dose     calcium carbonate (OS-SANDY) 1500 (600 Ca) MG tablet Take 600 mg by mouth daily   11/7/2022 at AM     multivitamin w/minerals (THERA-VIT-M) tablet Take 1 tablet by mouth daily   11/7/2022 at AM             Discharge Medications:     Current Discharge Medication List      START taking these medications    Details   acetaminophen (TYLENOL) 325 MG tablet Take 2 tablets (650 mg) by mouth every 6 hours as needed for mild pain  Qty: 40 tablet, Refills:  0    Associated Diagnoses: Urothelial cancer (H)      ibuprofen (ADVIL/MOTRIN) 600 MG tablet Take 1 tablet (600 mg) by mouth 4 times daily for 3 days Then as needed  Qty: 12 tablet, Refills: 0    Associated Diagnoses: Urothelial cancer (H)      oxyCODONE-acetaminophen (PERCOCET) 5-325 MG tablet Take 1 tablet by mouth every 6 hours as needed for pain  Qty: 12 tablet, Refills: 0    Associated Diagnoses: Urothelial cancer (H)         CONTINUE these medications which have NOT CHANGED    Details   calcium carbonate (OS-SANDY) 1500 (600 Ca) MG tablet Take 600 mg by mouth daily      multivitamin w/minerals (THERA-VIT-M) tablet Take 1 tablet by mouth daily                   Consultations:   Consultation during this admission received from Hospitalist          Brief History of Illness:   This is a 62 year old male I am seeing in consultation for a small bowel obstruction.  Patient began having abdominal pain on 11/4/2022 along with associated nausea and vomiting.  Last bowel movement was 11/6/2022.  Came to the emergency room last night and has had an NG tube placed.  Still complaining of nausea and hiccups.  NG was re-positioned and irrigated.  Total output 1700 mL.  CT scan was reviewed with Dr. Schafer and is consistent with a small bowel obstruction with likely transition in the right gutter.  Patient's only prior abdominal surgery was a robotic right nephrectomy in August 2022.            Hospital Course:    The patient failed non-operative therapy and went to OR on 11/10/22. The patient had metastatic urothelial cancer that was causing the obstruction. Right hemicolectomy done. Postoperatively patient had return of bowel function on POD #5. Tolerated an regular diet. Had a superficial wound infection that was opened. Erythema resolved and antibiotic stopped. Pathology stated tumor at proximal margin, but at time of surgery we re-did the anastomosis so there was and additional few cm removed and no gross tumor at  anastomosis. There is metastatic urothelial carcinoma from right kidney that was metastatic to serosa of terminal ileum ( causing obstruction) and to mesentery of right colon and appendix and 2/5 lymph nodes.           Discharge Instructions and Follow-Up:   Discharge diet: Regular   Discharge activity: Lifting restricted to 20 pounds for one month   Discharge follow-up: Follow up with Mirza uDmont MD FACS on 11/22/22 at 8:40 am   Wound care: NS wet-to-dry dressing changes BID           Discharge Disposition:   Discharged to home      Attestation:  I have reviewed today's vital signs, notes, medications, labs and imaging.    Mirza Dumont MD

## 2022-11-17 NOTE — PROGRESS NOTES
"  SAFETY CHECKLIST  ID Bands and Risk clasps correct and in place (DNR, Fall risk, Allergy, Latex, Limb):  Yes  All Lines Reconciled and labeled correctly: Yes  Whiteboard updated:Yes  Environmental interventions: Yes        Pt was hard to arouse, sleepy from pain PRN ativan, able to wake with gentle shake, vss.    BP (!) 141/85   Pulse 82   Temp 98.8  F (37.1  C) (Tympanic)   Resp 18   Ht 1.778 m (5' 10\")   Wt 91.4 kg (201 lb 6.4 oz)   SpO2 95%   BMI 28.90 kg/m        "

## 2022-11-17 NOTE — PLAN OF CARE
Dressing changed, no drainage new wet to dry placed, vss.  Denies pain. Wound directions given.

## 2022-11-17 NOTE — PROVIDER NOTIFICATION
11/17/22 0920   Valuables   Patient Belongings remains with patient   Patient Belongings Remaining with Patient clothing;cell phone/electronics;glasses   Did you bring any home meds/supplements to the hospital?  No   A               Admission:  I am responsible for any personal items that are not sent to the safe or pharmacy.  [facility] is not responsible for loss, theft or damage of any property in my possession.    Signature:  _________________________________ Date: _______  Time: _____                                              Staff Signature:  ____________________________ Date: ________  Time: _____      2nd Staff person, if patient is unable/unwilling to sign:    Signature: ________________________________ Date: ________  Time: _____     Discharge:  [facility] has returned all of my personal belongings:    Signature: _________________________________ Date: ________  Time: _____                                          Staff Signature:  ____________________________ Date: ________  Time: _____

## 2022-11-17 NOTE — PLAN OF CARE
Goal Outcome Evaluation:        Discharge Planner PT   Patient ambulating in hallway independently with spouse (who is also assisting patient with his ADLS). PT/OT to remain available to assist patient with mobility and ADLs as needed.       Entered by: Carlos Dong PT 11/17/2022 8:56 AM

## 2022-11-17 NOTE — PLAN OF CARE
"Patient ambulated in hallway and up to bathroom independently.  Vitals stable, pleasant and appropriate.  Tolerated dressing change after initially not wanting it done.  PRN Ativan given after midnight for increased anxiety.  IV patent, dressing replaced.      BP (!) 152/85 (BP Location: Right arm, Patient Position: Supine)   Pulse 85   Temp 99.9  F (37.7  C) (Tympanic)   Resp 16   Ht 1.778 m (5' 10\")   Wt 91.4 kg (201 lb 6.4 oz)   SpO2 96%   BMI 28.90 kg/m        Goal Outcome Evaluation:      Plan of Care Reviewed With: patient    Overall Patient Progress: improvingOverall Patient Progress: improving    Outcome Evaluation: Encourage activity, up to bathroom.      "

## 2022-11-17 NOTE — PROGRESS NOTES
SAFETY CHECKLIST  ID Bands and Risk clasps correct and in place (DNR, Fall risk, Allergy, Latex, Limb):  Yes  All Lines Reconciled and labeled correctly: Yes  Whiteboard updated:Yes  Environmental interventions: Yes - bedrails x 2, call light within reach  Verify Tele #:

## 2022-11-17 NOTE — TELEPHONE ENCOUNTER
Patient would like to know if instead of taking pain medication he wants to know if he can take his anxiety medication.  Patient states he would rather take the anxiety medication and does not seem to need the pain medication.    Michelle Silva on 11/17/2022 at 11:56 AM'

## 2022-11-17 NOTE — PROGRESS NOTES
11/15/22 1215   Appointment Info   Signing Clinician's Name / Credentials (PT) Ming Dong MPT   Living Environment   People in Home spouse   Current Living Arrangements house   Home Accessibility no concerns   Transportation Anticipated family or friend will provide   Self-Care   Usual Activity Tolerance good   Current Activity Tolerance fair   Equipment Currently Used at Home none   Fall history within last six months no   General Information   Referring Physician Tim   Patient/Family Therapy Goals Statement (PT) return home   Existing Precautions/Restrictions fall  (s/p hemicolectomy)   Weight-Bearing Status - LLE full weight-bearing   Weight-Bearing Status - RLE full weight-bearing   Cognition   Affect/Mental Status (Cognition) WFL   Orientation Status (Cognition) oriented x 4   Follows Commands (Cognition) WFL   Pain Assessment   Patient Currently in Pain Yes, see Vital Sign flowsheet   Integumentary/Edema   Integumentary/Edema Comments surgical incision on abdomin   Posture    Posture Forward head position;Protracted shoulders   Range of Motion (ROM)   Range of Motion ROM is WFL   Strength (Manual Muscle Testing)   Strength (Manual Muscle Testing) strength is WFL   Bed Mobility   Impairments Contributing to Impaired Bed Mobility pain   Comment, (Bed Mobility) minimal assist to SBA   Transfers   Impairments Contributing to Impaired Transfers pain   Comment, (Transfers) CGA to SBA with Fww   Gait/Stairs (Locomotion)   Frannie Level (Gait) contact guard   Assistive Device (Gait) walker, front-wheeled   Distance in Feet (Required for LE Total Joints) 15   Pattern (Gait) 3-point   Balance   Balance Comments fair with Fww   Sensory Examination   Sensory Perception WFL   Coordination   Coordination no deficits were identified   Muscle Tone   Muscle Tone no deficits were identified   Clinical Impression   Criteria for Skilled Therapeutic Intervention Yes, treatment indicated   PT Diagnosis (PT) impaired  mobility   Influenced by the following impairments pain and fatigue   Functional limitations due to impairments activity/gait tolerance   Clinical Presentation (PT Evaluation Complexity) Stable/Uncomplicated   Clinical Decision Making (Complexity) low complexity   Planned Therapy Interventions (PT) bed mobility training;gait training;transfer training   Anticipated Equipment Needs at Discharge (PT) walker, rolling   Risk & Benefits of therapy have been explained risks/benefits reviewed;patient   PT Total Evaluation Time   PT Eval, Low Complexity Minutes (66394) 15   Plan of Care Review   Plan of Care Reviewed With patient   Physical Therapy Goals   PT Frequency Daily   PT Predicted Duration/Target Date for Goal Attainment 11/21/22   PT Goals Bed Mobility;Transfers;Gait   PT: Bed Mobility Supervision/stand-by assist   PT: Transfers Supervision/stand-by assist;Assistive device   PT: Gait Supervision/stand-by assist;Rolling walker;150 feet   PT Discharge Planning   PT Plan continue PT   PT Discharge Recommendation (DC Rec) home with assist   PT Rationale for DC Rec patient anticipated to be near baseline for mobility   PT Brief overview of current status CGA to SBA for all mobilities using a Fww for gait activities

## 2022-11-17 NOTE — PROGRESS NOTES
[facility] DISCHARGE NOTE    Patient discharged to home at 9:54 AM via ambulation. Accompanied by spouse and staff. Discharge instructions reviewed with patient, opportunity offered to ask questions. Prescriptions sent to patients preferred pharmacy. All belongings sent with patient.    George Kim RN

## 2022-11-18 NOTE — TELEPHONE ENCOUNTER
Spoke with spouse and patient would like some anxiety medication to cope with cancer diagnosis. I explained that the surgeons do not manage medications other than pain medication for surgical patients.  Esther Vegas LPN..........11/18/2022  11:20 AM

## 2022-11-18 NOTE — TELEPHONE ENCOUNTER
Patient has not received a call back since yesterday . Dr. Dumont  is not available, can Eduardo Jones address this today and give him a call back please?      Lisa Eagle on 11/18/2022 at 8:55 AM

## 2022-11-18 NOTE — TELEPHONE ENCOUNTER
Surgical. Patient discharged with surgical follow-up only. No TCM call required per policy.     Miri Masters RN on 11/18/2022 at 8:26 AM

## 2022-11-18 NOTE — TELEPHONE ENCOUNTER
Spoke with spouse. She was informed anxiety medications require an appointment. She states patient now has a PCP at [facility], not [facility]. Kandis Posada taken out as PCP and [facility] added as PCP.     Caroline Howard CMA on 11/18/2022 at 12:11 PM

## 2022-11-22 PROBLEM — G47.09 OTHER INSOMNIA: Status: ACTIVE | Noted: 2022-01-01

## 2022-11-22 PROBLEM — Z11.52 ENCOUNTER FOR SCREENING LABORATORY TESTING FOR COVID-19 VIRUS: Status: RESOLVED | Noted: 2022-11-10 | Resolved: 2022-01-01

## 2022-11-22 PROBLEM — F41.9 ANXIETY: Status: ACTIVE | Noted: 2022-01-01

## 2022-11-22 NOTE — NURSING NOTE
"Chief Complaint   Patient presents with     Post-Op - General Surgery     Here today for a post-op follow up.       Initial BP (!) 88/78 (BP Location: Right arm, Patient Position: Sitting, Cuff Size: Adult Large)   Pulse 90   Temp 98  F (36.7  C) (Tympanic)   Resp 20   Wt 86.5 kg (190 lb 9.6 oz)   SpO2 98%   BMI 27.35 kg/m   Estimated body mass index is 27.35 kg/m  as calculated from the following:    Height as of 11/8/22: 1.778 m (5' 10\").    Weight as of this encounter: 86.5 kg (190 lb 9.6 oz).  Medication Reconciliation: complete    Esther Vegas LPN  "

## 2022-11-22 NOTE — PROGRESS NOTES
Subjective:  This is a follow up after right hemicolectomy on 11/10/22. The patient has complaints of insomnia. Has been needing Ambien and ativan. Has been having BM's loose and formed. Appetite poor.    Objective:BP (!) 88/76 (BP Location: Right arm, Patient Position: Sitting, Cuff Size: Adult Large)   Pulse 90   Temp 98  F (36.7  C) (Tympanic)   Resp 20   Wt 86.5 kg (190 lb 9.6 oz)   SpO2 98%   BMI 27.35 kg/m    The incision is healing well without erythema. Wound is 5 x 1.5 cm x 1 cm deep. Good granulation. Some exudate. Flat, soft and non-tender.    Assessment:   Stable after right hemicolectomy for SBO from metastatic urothelial cancer.    Plan:  Continue NS wet-to-dry dressings BID  Follow-up next  week  Refilled Ambien and Ativan  Oncology appt with Dr Ace on 12/13/22

## 2023-01-01 ENCOUNTER — HEALTH MAINTENANCE LETTER (OUTPATIENT)
Age: 63
End: 2023-01-01

## (undated) DEVICE — SOL WATER 1500ML

## (undated) DEVICE — STPL RELOAD LINEAR 60X4.8MM XR60G

## (undated) DEVICE — SU VICRYL 3-0 SH 27" UND J416H

## (undated) DEVICE — SUTURE 2-0 SILK C016T SH

## (undated) DEVICE — Device

## (undated) DEVICE — ENDO BRUSH CHANNEL MASTER CLEANING 2-4.2MM BW-412T

## (undated) DEVICE — ESU ELEC BLADE 2.75" COATED/INSULATED E1455

## (undated) DEVICE — SU VICRYL 2-0 CTX 36" UND J979H

## (undated) DEVICE — ENDO KIT COMPLIANCE DYKENDOCMPLY

## (undated) DEVICE — PACK MAJOR LAPAROTOMY LF SBA15MLFCA

## (undated) DEVICE — GLOVE BIOGEL PI INDICATOR 8.0 LF 41680

## (undated) DEVICE — GLOVE PROTEXIS POWDER FREE SMT 8.0  2D72PT80X

## (undated) DEVICE — SU VICRYL 2-0 CT-2 27" UND J269H

## (undated) DEVICE — STPL LINEAR CUT 55MM TLC55

## (undated) DEVICE — ESU GROUND PAD ADULT W/CORD E7507

## (undated) DEVICE — GOWN XLG/LONG ISOLATION MICROCOOL DISP 92353

## (undated) DEVICE — SUCTION MANIFOLD NEPTUNE 2 SYS 4 PORT 0702-020-000

## (undated) DEVICE — NDL COUNTER 40CT  31142311

## (undated) DEVICE — PACK CYSTO SBA15CSFCA

## (undated) DEVICE — CATH TRAY FOLEY SURESTEP 16FR W/URINE MTR STATLK LF A303416A

## (undated) DEVICE — DRSG STERI STRIP 1/2X4" R1547

## (undated) DEVICE — PUMP SYSTEM SINGLE ACTION M0067201000

## (undated) DEVICE — DRSG GAUZE 4X8"

## (undated) DEVICE — SPONGE LAP 18X18" X8435

## (undated) DEVICE — PAD CHUX UNDERPAD 30X36" P3036C

## (undated) DEVICE — STPL LINEAR 60X4.8MM TX60G

## (undated) DEVICE — SUTURE 5-0 MAXON SMM5042

## (undated) DEVICE — COVER LIGHT HANDLE LT-F02

## (undated) DEVICE — SLEEVE COMPRESSION SCD KNEE MED 74022

## (undated) DEVICE — TUBING SUCTION 10'X3/16" N510

## (undated) DEVICE — SU MONOCRYL 4-0 PS-2 27" UND Y426H

## (undated) DEVICE — ESU PENCIL SMOKE EVAC W/ROCKER SWITCH 0703-047-000

## (undated) DEVICE — SUTURE 2-0 VICRYL TIES J911T

## (undated) DEVICE — CATH COUDE 18FR TIEMANN LATEX 120618

## (undated) DEVICE — GLOVE PROTEXIS BLUE W/NEU-THERA 8.0  2D73EB80

## (undated) DEVICE — STPL RELOAD LINEAR CUT 55MM TCR55

## (undated) DEVICE — GLOVE PROTEXIS BLUE W/NEU-THERA 6.5  2D73EB65

## (undated) DEVICE — GLOVE PROTEXIS POWDER FREE SMT 7.5  2D72PT75X

## (undated) DEVICE — SUCTION TIP POOLE K770

## (undated) DEVICE — GUIDEWIRE SENSOR DUAL FLEX STR 0.035"X150CM M0066703080

## (undated) DEVICE — GUIDEWIRE AMPLATZ SUPER STIFF .038"X145CM M0066401061

## (undated) DEVICE — SU VICRYL 2-0 CT-1 36" UND J945H

## (undated) DEVICE — TOWEL SURG 17INW X 26INL CTTN BLUE STRL 8324B

## (undated) DEVICE — NDL COUNTER BLADE GUARD II 15/30 31142295

## (undated) DEVICE — GLOVE PROTEXIS POWDER FREE SMT 8.5 2D72PT85X

## (undated) DEVICE — TUOGHY BORST ADAPTER WITH SIDE ARM

## (undated) DEVICE — SU VICRYL 3-0 CT 36" J356H

## (undated) DEVICE — SUCTION TIP YANKAUER W/O VENT BULBOUS TIP

## (undated) DEVICE — DRSG MEDIPORE 3 1/2X13 3/4" 3573

## (undated) DEVICE — ENDO FORCEP ENDOJAW BIOPSY 2.8MMX230CM FB-220U

## (undated) DEVICE — CATH URETERAL 5FRX70CM OPEN END FLEX TIP G14521

## (undated) DEVICE — FASTENER LEGBAND CATHETER DALE 316

## (undated) RX ORDER — BUPIVACAINE HYDROCHLORIDE 2.5 MG/ML
INJECTION, SOLUTION EPIDURAL; INFILTRATION; INTRACAUDAL
Status: DISPENSED
Start: 2022-11-10

## (undated) RX ORDER — LORAZEPAM 2 MG/ML
INJECTION INTRAMUSCULAR
Status: DISPENSED
Start: 2022-11-08

## (undated) RX ORDER — ACETAMINOPHEN 325 MG/1
TABLET ORAL
Status: DISPENSED
Start: 2021-03-18

## (undated) RX ORDER — LIDOCAINE HYDROCHLORIDE 20 MG/ML
JELLY TOPICAL
Status: DISPENSED
Start: 2021-04-06

## (undated) RX ORDER — HYDROMORPHONE HYDROCHLORIDE 1 MG/ML
INJECTION, SOLUTION INTRAMUSCULAR; INTRAVENOUS; SUBCUTANEOUS
Status: DISPENSED
Start: 2022-11-10

## (undated) RX ORDER — FENTANYL CITRATE 50 UG/ML
INJECTION, SOLUTION INTRAMUSCULAR; INTRAVENOUS
Status: DISPENSED
Start: 2022-11-10

## (undated) RX ORDER — MORPHINE SULFATE 0.5 MG/ML
INJECTION, SOLUTION EPIDURAL; INTRATHECAL; INTRAVENOUS
Status: DISPENSED
Start: 2022-11-10

## (undated) RX ORDER — CEFAZOLIN SODIUM 1 G/3ML
INJECTION, POWDER, FOR SOLUTION INTRAMUSCULAR; INTRAVENOUS
Status: DISPENSED
Start: 2022-11-10

## (undated) RX ORDER — FENTANYL CITRATE 50 UG/ML
INJECTION, SOLUTION INTRAMUSCULAR; INTRAVENOUS
Status: DISPENSED
Start: 2022-11-07

## (undated) RX ORDER — LIDOCAINE HYDROCHLORIDE 20 MG/ML
INJECTION, SOLUTION EPIDURAL; INFILTRATION; INTRACAUDAL; PERINEURAL
Status: DISPENSED
Start: 2021-04-06

## (undated) RX ORDER — MORPHINE SULFATE 4 MG/ML
INJECTION, SOLUTION INTRAMUSCULAR; INTRAVENOUS
Status: DISPENSED
Start: 2022-11-07

## (undated) RX ORDER — ONDANSETRON 2 MG/ML
INJECTION INTRAMUSCULAR; INTRAVENOUS
Status: DISPENSED
Start: 2021-03-18

## (undated) RX ORDER — PROPOFOL 10 MG/ML
INJECTION, EMULSION INTRAVENOUS
Status: DISPENSED
Start: 2022-11-10

## (undated) RX ORDER — LIDOCAINE HYDROCHLORIDE 20 MG/ML
INJECTION, SOLUTION EPIDURAL; INFILTRATION; INTRACAUDAL; PERINEURAL
Status: DISPENSED
Start: 2022-11-10

## (undated) RX ORDER — PROPOFOL 10 MG/ML
INJECTION, EMULSION INTRAVENOUS
Status: DISPENSED
Start: 2021-11-02

## (undated) RX ORDER — ONDANSETRON 2 MG/ML
INJECTION INTRAMUSCULAR; INTRAVENOUS
Status: DISPENSED
Start: 2022-11-10

## (undated) RX ORDER — PROPOFOL 10 MG/ML
INJECTION, EMULSION INTRAVENOUS
Status: DISPENSED
Start: 2021-04-06

## (undated) RX ORDER — CEFTRIAXONE SODIUM 1 G/50ML
INJECTION, SOLUTION INTRAVENOUS
Status: DISPENSED
Start: 2021-04-06

## (undated) RX ORDER — GLYCOPYRROLATE 0.2 MG/ML
INJECTION, SOLUTION INTRAMUSCULAR; INTRAVENOUS
Status: DISPENSED
Start: 2022-11-10

## (undated) RX ORDER — ONDANSETRON 2 MG/ML
INJECTION INTRAMUSCULAR; INTRAVENOUS
Status: DISPENSED
Start: 2022-11-07

## (undated) RX ORDER — LIDOCAINE HYDROCHLORIDE 20 MG/ML
INJECTION, SOLUTION EPIDURAL; INFILTRATION; INTRACAUDAL; PERINEURAL
Status: DISPENSED
Start: 2021-11-02

## (undated) RX ORDER — ONDANSETRON 2 MG/ML
INJECTION INTRAMUSCULAR; INTRAVENOUS
Status: DISPENSED
Start: 2021-04-06

## (undated) RX ORDER — HYDROXYZINE HYDROCHLORIDE 50 MG/ML
INJECTION, SOLUTION INTRAMUSCULAR
Status: DISPENSED
Start: 2022-11-10

## (undated) RX ORDER — DEXAMETHASONE SODIUM PHOSPHATE 4 MG/ML
INJECTION, SOLUTION INTRA-ARTICULAR; INTRALESIONAL; INTRAMUSCULAR; INTRAVENOUS; SOFT TISSUE
Status: DISPENSED
Start: 2022-11-10

## (undated) RX ORDER — VECURONIUM BROMIDE 1 MG/ML
INJECTION, POWDER, LYOPHILIZED, FOR SOLUTION INTRAVENOUS
Status: DISPENSED
Start: 2022-11-10

## (undated) RX ORDER — SODIUM CHLORIDE 9 MG/ML
INJECTION, SOLUTION INTRAVENOUS
Status: DISPENSED
Start: 2021-03-18

## (undated) RX ORDER — NEOSTIGMINE METHYLSULFATE 0.5 MG/ML
INJECTION INTRAVENOUS
Status: DISPENSED
Start: 2022-11-10

## (undated) RX ORDER — CEFAZOLIN SODIUM/WATER 2 G/20 ML
SYRINGE (ML) INTRAVENOUS
Status: DISPENSED
Start: 2022-11-10

## (undated) RX ORDER — DIPHENHYDRAMINE HYDROCHLORIDE 50 MG/ML
INJECTION INTRAMUSCULAR; INTRAVENOUS
Status: DISPENSED
Start: 2022-11-10

## (undated) RX ORDER — ACETAMINOPHEN 10 MG/ML
INJECTION, SOLUTION INTRAVENOUS
Status: DISPENSED
Start: 2022-11-10

## (undated) RX ORDER — HYDROMORPHONE HYDROCHLORIDE 2 MG/ML
INJECTION, SOLUTION INTRAMUSCULAR; INTRAVENOUS; SUBCUTANEOUS
Status: DISPENSED
Start: 2022-11-10